# Patient Record
Sex: FEMALE | Race: WHITE | NOT HISPANIC OR LATINO | Employment: UNEMPLOYED | URBAN - METROPOLITAN AREA
[De-identification: names, ages, dates, MRNs, and addresses within clinical notes are randomized per-mention and may not be internally consistent; named-entity substitution may affect disease eponyms.]

---

## 2017-01-16 ENCOUNTER — ALLSCRIPTS OFFICE VISIT (OUTPATIENT)
Dept: OTHER | Facility: OTHER | Age: 27
End: 2017-01-16

## 2017-04-26 ENCOUNTER — ALLSCRIPTS OFFICE VISIT (OUTPATIENT)
Dept: OTHER | Facility: OTHER | Age: 27
End: 2017-04-26

## 2017-05-19 ENCOUNTER — ALLSCRIPTS OFFICE VISIT (OUTPATIENT)
Dept: OTHER | Facility: OTHER | Age: 27
End: 2017-05-19

## 2017-05-21 LAB
HSV 1 BY MOLECULAR METHOD (HISTORICAL): DETECTED
HSV 2 BY MOLECULAR METHOD (HISTORICAL): NOT DETECTED

## 2017-07-13 ENCOUNTER — TRANSCRIBE ORDERS (OUTPATIENT)
Dept: ADMINISTRATIVE | Facility: HOSPITAL | Age: 27
End: 2017-07-13

## 2017-07-13 ENCOUNTER — APPOINTMENT (OUTPATIENT)
Dept: RADIOLOGY | Facility: MEDICAL CENTER | Age: 27
End: 2017-07-13
Payer: COMMERCIAL

## 2017-07-13 ENCOUNTER — ALLSCRIPTS OFFICE VISIT (OUTPATIENT)
Dept: OTHER | Facility: OTHER | Age: 27
End: 2017-07-13

## 2017-07-13 DIAGNOSIS — R07.89 OTHER CHEST PAIN: ICD-10-CM

## 2017-07-13 DIAGNOSIS — R05.9 COUGH: ICD-10-CM

## 2017-07-13 PROCEDURE — 71020 HB CHEST X-RAY 2VW FRONTAL&LATL: CPT

## 2017-07-17 ENCOUNTER — ALLSCRIPTS OFFICE VISIT (OUTPATIENT)
Dept: OTHER | Facility: OTHER | Age: 27
End: 2017-07-17

## 2017-09-28 ENCOUNTER — GENERIC CONVERSION - ENCOUNTER (OUTPATIENT)
Dept: OTHER | Facility: OTHER | Age: 27
End: 2017-09-28

## 2017-11-27 ENCOUNTER — GENERIC CONVERSION - ENCOUNTER (OUTPATIENT)
Dept: OTHER | Facility: OTHER | Age: 27
End: 2017-11-27

## 2017-11-27 ENCOUNTER — TRANSCRIBE ORDERS (OUTPATIENT)
Dept: ADMINISTRATIVE | Facility: HOSPITAL | Age: 27
End: 2017-11-27

## 2017-11-27 ENCOUNTER — ALLSCRIPTS OFFICE VISIT (OUTPATIENT)
Dept: OTHER | Facility: OTHER | Age: 27
End: 2017-11-27

## 2017-11-27 ENCOUNTER — APPOINTMENT (OUTPATIENT)
Dept: RADIOLOGY | Facility: MEDICAL CENTER | Age: 27
End: 2017-11-27
Payer: COMMERCIAL

## 2017-11-27 DIAGNOSIS — M25.562 PAIN IN LEFT KNEE: ICD-10-CM
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
## 2017-11-27 DIAGNOSIS — M79.605 PAIN OF LEFT LEG: ICD-10-CM

## 2017-11-27 PROCEDURE — 73564 X-RAY EXAM KNEE 4 OR MORE: CPT

## 2017-11-27 PROCEDURE — 73590 X-RAY EXAM OF LOWER LEG: CPT

## 2017-11-28 NOTE — PROGRESS NOTES
Assessment    1  Acute pain of left knee (719 46) (M25 562)   2  Acute pain of left lower extremity (729 5) (M79 605)   3  Fall (on) (from) other stairs and steps, initial encounter (E880 9) (W10 8XXA)   4  Influenza vaccine administered (V04 81) (Z23)    Plan   Acute pain of left knee, Acute pain of left lower extremity, Fall (on) (from) other stairs andsteps, initial encounter    · TraMADol HCl - 50 MG Oral Tablet; take 1-2 tabs PO BID prn severe left leg/kneepain   · Meloxicam 15 MG Oral Tablet; TAKE 1 TABLET DAILY WITH FOOD  Influenza vaccine administered    · Fluzone Quadrivalent Intramuscular Suspension  * XR TIBIA FIBULA 2 VIEW LEFT; Status:Resulted - Requires Verification;   Done: 20HTW5662 12:00AM Due:27Nov2018; Ordered; For:Acute pain of left knee, Acute pain of left lower extremity, Fall (on) (from) other stairs and steps, initial encounter; Ordered By:Felicity Rosado;  * XR KNEE 4+ VW LEFT INJURY; Status:Resulted - Requires Verification;   Done: 21TZU0840 12:00AM Due:27Nov2018; Ordered; For:Acute pain of left knee, Acute pain of left lower extremity, Fall (on) (from) other stairs and steps, initial encounter; Ordered By:Zeny Rosado; Discussion/Summary    26-year-old female here today for concern of injury to left lower leg and left knee after falling down half a flight of steps about 4-5 days ago  She notes pain started a few hours after the fall in her left knee and has progressed down into her left lateral calf  With injury I will have her obtain a left knee and left tibia/fibula x-ray to rule out acute bony trauma  We will call her with results when they become available  I will have her discontinue anti-inflammatories over-the-counter and start meloxicam once a day with food in addition to tramadol p r n  severe, uncontrolled pain with side effects discussed   She was advised to consider wearing Ace wrap for compression around her knee instability as well as start icing her knee and heat to her left calf  She is to monitor her symptoms and call sooner with any concerns  Otherwise we will call her with results of the x-rays when they become available  If she is still in excruciating pain and x-rays do not reveal any acute bony or joint abnormalities I may consider MRI  Patient received flu vaccine today  Possible side effects of new medications were reviewed with the patient/guardian today  The treatment plan was reviewed with the patient/guardian  The patient/guardian understands and agrees with the treatment plan      Chief Complaint  Pt c/o L leg pain from her knee down x 5 days  Pt states she fell down half a flight of stairs  Pt states she feels pain when weight baring and when bending  History of Present Illness  HPI: 26y/o female here today for leg pain after falling down steps half flight of steps on thanksgiving about 5 days ago  she has been taking tylenol/ibuprofen, resting it and heat  states pain started in left knee, and since pain at top of knee and all down into her lower leg and left ankle  front of left knee and lateral lower leg  pain constant, worse to weight bear, bending knee going up steps  she doesnât know exactly how injury happened to knee/leg  Review of Systems   Constitutional: No fever, no chills, feels well, no tiredness, no recent weight gain or loss  Musculoskeletal: as noted in HPI  Integumentary: no complaints of skin rash or lesion, no itching or dry skin, no skin wounds  Neurological: no complaints of headache, no confusion, no numbness or tingling, no dizziness or fainting  Active Problems  1  Abnormal menses (626 9) (N92 6)   2  Breast tenderness in female (611 71) (N64 4)   3  Decreased hearing (389 9) (H91 90)   4  Dysfunction of left eustachian tube (381 81) (H69 82)   5  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) (A60 04)   6  Left-sided tinnitus (388 30) (H93 12)   7  Tinnitus of left ear (388 30) (H93 12)   8   Vertigo (780 4) (R42)    Past Medical History  1  History of Acute left lower quadrant pain (789 04,338 19) (R10 32)   2  History of Chest discomfort (786 59) (R07 89)   3  History of Healthy female   4  Denied: History of abnormal cervical Pap smear   5  History of acute bacterial sinusitis (V12 69) (Z87 09)   6  History of acute bronchitis (V12 69) (Z87 09)   7  History of cough   8  Denied: History of herpes simplex infection   9  History of nausea (V12 79) (Z87 898)   10  History of pregnancy (V13 29)   11  History of Pilonidal cyst with abscess (685 0) (L05 01)   12  History of Varicella without complication (361 7) (R56 4)   13  History of Vulvar lesion (624 8) (N90 89)   14  History of Vulvovaginitis (616 10) (N76 0)    Family History  Mother    1  Family history of epilepsy (V17 2) (Z82 0)   2  Family history of seizure disorder (V17 2) (Z82 0)  Father    3  Family history of cardiac disorder (V17 49) (Z82 49)   4  Family history of cardiac pacemaker (V17 49) (Z84 89)   5  Family history of hypertension (V17 49) (Z82 49)   6  Family history of stroke (V17 1) (Z82 3)  Paternal Grandfather    7  Family history of ischemic heart disease (V17 3) (Z82 49)  Paternal Relatives    8  Family history of cancer (V16 9) (Z80 9)    Social History   ·    · Never a smoker   · No alcohol use   · No drug use   · Occupation   · homemaker   in the past   · Worship Affiliation   · [facility]  The social history was reviewed and updated today  Surgical History    1  History of Cholecystectomy   2  History of Oral Surgery Tooth Extraction   3  History of Pilonidal Cyst Resection   4  History of Salpingectomy    Current Meds   1  Multi Vitamin Daily Oral Tablet; Therapy: (Recorded:14Ias8401) to Recorded   2  ZyrTEC Allergy 10 MG Oral Tablet; Take 1 tablet daily; Therapy: 26MAG9507 to Recorded    The medication list was reviewed and updated today  Allergies  1   No Known Drug Allergies    Vitals   Recorded: 93RGE3685 12: 26PM   Temperature 99 5 F, Tympanic   Heart Rate 120   Respiration Quality Normal   Respiration 16   Systolic 768, LUE, Sitting   Diastolic 72, LUE, Sitting   Weight 137 lb 9 oz   BMI Calculated 22 89   BSA Calculated 1 69   O2 Saturation 98, RA   Pain Scale 5       Physical Exam   Constitutional  General appearance: No acute distress, well appearing and well nourished  appears healthy,-- within normal limits of ideal weight-- and-- appearance reflects stated age  Musculoskeletal  Gait and station: Abnormal  -- antalgic gait  Inspection/palpation of joints, bones, and muscles: Abnormal  -- Left knee/leg appearing mildly swollen along the lateral compartment of the left knee without any significant obvious trauma or bony abnormality otherwise  There is no bruising or open wounds  There is exquisite tenderness to palpation along the medial and lateral borders as well as over the patella and inferior border to palpation as well as exquisite pain to light palpation along the lateral calf without any warmth, redness or swelling of that muscle  She has relatively full range of motion of her left actively with pain upon extreme flexion and extension  There is crepitus but no significant instability noted  Neck, spine and hips grossly normal on exam without any obvious instability or pain or limitation  Skin  Skin and subcutaneous tissue: Normal without rashes or lesions  Psychiatric  Orientation to person, place, and time: Normal    Mood and affect: Normal    Additional Exam:  Vitals reviewed  Signatures   Electronically signed by : Pily Henry, [facility]; Nov 27 2017 12:56PM EST                       (Author)    Electronically signed by :  Lauren Sandhoff, DO; Nov 27 2017 10:12PM EST                       (Author)

## 2018-01-09 NOTE — RESULT NOTES
Verified Results  (Q) CULTURE, URINE, SPECIAL 95BXC9036 12:00AM Terra Glass     Test Name Result Flag Reference   CULTURE, URINE, SPECIAL      CULTURE, URINE, SPECIAL         MICRO NUMBER:      [id number]    TEST STATUS:       FINAL    SPECIMEN SOURCE:   URINE    SPECIMEN QUALITY:  ADEQUATE    RESULT:            No Growth

## 2018-01-13 VITALS
OXYGEN SATURATION: 97 % | HEIGHT: 65 IN | HEART RATE: 115 BPM | TEMPERATURE: 99.2 F | DIASTOLIC BLOOD PRESSURE: 70 MMHG | SYSTOLIC BLOOD PRESSURE: 100 MMHG | WEIGHT: 132 LBS | BODY MASS INDEX: 21.99 KG/M2

## 2018-01-13 VITALS
RESPIRATION RATE: 16 BRPM | SYSTOLIC BLOOD PRESSURE: 112 MMHG | DIASTOLIC BLOOD PRESSURE: 76 MMHG | TEMPERATURE: 99.5 F | OXYGEN SATURATION: 98 % | HEIGHT: 65 IN | BODY MASS INDEX: 21.89 KG/M2 | WEIGHT: 131.38 LBS | HEART RATE: 110 BPM

## 2018-01-14 VITALS
HEART RATE: 87 BPM | RESPIRATION RATE: 16 BRPM | DIASTOLIC BLOOD PRESSURE: 70 MMHG | TEMPERATURE: 97.5 F | WEIGHT: 128.5 LBS | HEIGHT: 66 IN | SYSTOLIC BLOOD PRESSURE: 112 MMHG | OXYGEN SATURATION: 98 % | BODY MASS INDEX: 20.65 KG/M2

## 2018-01-14 VITALS
TEMPERATURE: 99.5 F | SYSTOLIC BLOOD PRESSURE: 116 MMHG | OXYGEN SATURATION: 98 % | HEART RATE: 120 BPM | BODY MASS INDEX: 22.89 KG/M2 | DIASTOLIC BLOOD PRESSURE: 72 MMHG | RESPIRATION RATE: 16 BRPM | WEIGHT: 137.56 LBS

## 2018-01-14 VITALS
SYSTOLIC BLOOD PRESSURE: 106 MMHG | DIASTOLIC BLOOD PRESSURE: 68 MMHG | WEIGHT: 133 LBS | HEIGHT: 65 IN | BODY MASS INDEX: 22.16 KG/M2

## 2018-01-14 VITALS
DIASTOLIC BLOOD PRESSURE: 76 MMHG | HEART RATE: 96 BPM | SYSTOLIC BLOOD PRESSURE: 112 MMHG | OXYGEN SATURATION: 98 % | BODY MASS INDEX: 21.84 KG/M2 | TEMPERATURE: 99.6 F | HEIGHT: 65 IN | WEIGHT: 131.06 LBS

## 2018-01-15 NOTE — RESULT NOTES
Verified Results  * US PELVIS COMPLETE (TRANSABDOMINAL AND TRANSVAGINAL) 11Vrl1241 10:03AM Nikole Bowden Order Number: [id number]   Performing Comments: may use intravaginal approach as necessary   - Patient Instructions: To schedule this appointment, please contact Central Scheduling at [phone]   Order Number: [id number]   Performing Comments: may use intravaginal approach as necessary   - Patient Instructions: To schedule this appointment, please contact Central Scheduling at [phone]  Test Name Result Flag Reference   US PELVIS COMPLETE (TRANSABDOMINAL AND TRANSVAGINAL) (Report)     PELVIC ULTRASOUND, COMPLETE     INDICATION: Irregular menstruation  Unsure of LMP  Levell Guise discharge August 12-August 19  Negative pregnancy test  Nausea        COMPARISON: None  TECHNIQUE:  Transabdominal pelvic ultrasound was performed in sagittal and transverse planes with a curvilinear transducer  Additional transvaginal imaging was performed to better evaluate the endometrium and ovaries  Imaging included volumetric    sweeps as well as traditional still imaging technique  FINDINGS: Transabdominal exam limited by empty urinary bladder  UTERUS:   The uterus is anteverted in position, measuring 8 5 x 4 4 x 4 3 cm  Volume = 85 mL  Contour and echotexture appear normal  Nabothian cysts lower uterine segment  The cervix shows no suspicious abnormality  ENDOMETRIUM:    Normal caliber of 8 mm  Homogenous and normal in appearance  OVARIES/ADNEXA:   Right ovary: 3 8 x 2 4 x 2 7 cm  Dominant follicle measuring 1 9 cm  No suspicious right ovarian abnormality  Doppler flow within normal limits  Left ovary: 2 8 x 1 9 x 1 4 cm  No suspicious left ovarian abnormality  Doppler flow within normal limits  No suspicious adnexal mass or loculated collections  Trace physiologic pelvic fluid  IMPRESSION:   Dominant follicle right ovary     Nabothian cysts lower uterine segment  Normal thickness endometrium  Workstation performed: [id number]     Signed by:    Yajaira Gonzalez DO   8/22/16

## 2018-01-15 NOTE — MISCELLANEOUS
Message   Recorded as Task   Date: 04/29/2016 09:07 AM, Created By: Srikanth Washington   Task Name: Call Back   Assigned To: [facility],Team   Regarding Patient: William Clayton, Status: Active   Comment:    Srikanth Washington - 29 Apr 2016 9:07 AM     TASK CREATED  Caller: Pilar Piedra  Routine post op call placed to patient  She had an exicision of pilonidal cyst on 4/28/16  No answer at [phone]  Message left for patient to call the office with a condition update  Her post op appointment is scheduled on 5/10/16 @ 1045 with CAMERON Lott Kathryn - 29 Apr 2016 12:07 PM     TASK EDITED  Patient called stating that she is doing well  She had no problems to report  She does ask for clarification on her lifting restrictions  Discussed with Dr Fiordaliza Kenyon  Her limitations are to be:  1  No lifting from a squatting position  2   No lifting more tht 15 - 20 pounds  3   She may lift 15 - 20 pounds from a chair but not from the floor  I called Eulalio Johns with the insturctions and she verbalized understanding  Denae Haley - 03 May 2016 9:50 AM     TASK EDITED  Message left for patient to call the office for pathology results  Denae Haley - 03 May 2016 2:31 PM     TASK EDITED  Called patient and informed her that the pathology results were negative for malignancy  She requested that her post op appointment be rescheduled from 5/10/16 to 5/12/16 @ [phone]  Active Problems    1  Pilonidal cyst (685 1) (L05 91)    Current Meds   1  Multi Vitamin Daily Oral Tablet; Therapy: (Recorded:70Qkv1096) to Recorded   2  Multivitamin Adult Oral Tablet; TAKE 1 TABLET DAILY; Therapy: 01Idi0331 to Recorded   3  Oxycodone-Acetaminophen 5-325 MG Oral Tablet; Therapy: 70Wpo1378 to Recorded    Allergies    1   No Known Drug Allergies    Signatures   Electronically signed by : Pilar Piedra, ; May  3 2016  2:31PM EST                       (Author)

## 2018-01-16 NOTE — PROGRESS NOTES
Active Problems    1  Abnormal menses (626 9) (N92 6)   2  Breast tenderness in female (611 71) (N64 4)   3  Contraceptive education (V25 09) (Z30 09)   4  Decreased hearing (389 9) (H91 90)   5  Dysfunction of left eustachian tube (381 81) (H69 82)   6  Left-sided tinnitus (388 30) (H93 12)   7  Multiparity (V61 5) (Z64 1)   8  Nausea (787 02) (R11 0)   9  Vulvar lesion (624 8) (N90 89)   10  Vulvovaginitis (616 10) (N76 0)    Current Meds   1  Monistat 7 Complete Therapy 100-2 MG-% Vaginal Kit; 1 applicatorful per vagina nightly   for 7 nights; Therapy: 66Pvc8495 to (Last Rx:70Val3096)  Requested for: 33Rus1077; Status:   ACTIVE - Transmit to [facility] Verification Ordered   2  Multi Vitamin Daily Oral Tablet; Therapy: (Recorded:34Mce9165) to Recorded    Allergies    1  No Known Drug Allergies    Future Appointments    Date/Time Provider Specialty Site   10/04/2016 11:00 AM CHAIM Redman  Obstetrics/Gynecology ST [address] [place] [address] OB/GYN     Message     Date: 19 Sep 2016 9:40 AM EST, Recorded By: Jose Luis Horne For: Clementine Sepulveda   Caller: Jacobo Kelly   Phone: [phone] (Home)   Reason: General Medical Question   pt  called the office stating that she was seen last Monday and was given a script for spots and itchiness  Itching has gone away but there are some spots left  Asked about having a biopsy  As per BEO okay to wait until Pt  gets back in town to reevaluate  Pt aware       Signatures   Electronically signed by : CHAIM Ortiz ; Sep 19 2016  1:36PM EST                       (Author)

## 2018-01-17 NOTE — RESULT NOTES
Verified Results  (1) TISSUE EXAM 27Oct2016 05:45PM Andry Rosen     Test Name Result Flag Reference   LAB AP CASE REPORT (Report)     Surgical Pathology Report             Case: [id number]                   Authorizing Provider: Teofilo Steve MD    Collected:      10/27/2016 1745        Ordering Location:   [facility]    Received:      10/28/2016 [address] Operating Room                            Pathologist:      Magnolia Alcantara MD                             Specimen:  Fallopian Tubes, Bilateral, Bilateral tubes   LAB AP FINAL DIAGNOSIS      A , B  Fallopian tubes, bilateral:    - Complete cross sections identified  - Benign paratubal cysts  Electronically signed by Magnolia Alcantara MD on 10/31/2016 at 9:17 AM   LAB AP NOTE      Interpretation performed at [facility], [address] [place] [address]   [id number]   LAB AP SURGICAL ADDITIONAL INFORMATION (Report)     These tests were developed and their performance characteristics   determined by [facility] or [facility]  They may not be cleared or approved by the [place]  Food and   Drug Administration  The FDA has determined that such clearance or   approval is not necessary  These tests are used for clinical purposes  They should not be regarded as investigational or for research  This   laboratory has been approved by [facility], designated as a high-complexity   laboratory and is qualified to perform these tests  LAB AP GROSS DESCRIPTION (Report)     A  The specimen is received in formalin, labeled with the patient's name   and hospital number, and is designated bilateral tubes   The specimen   consists of 2 soft and rubbery purple tan fallopian tubes  One fallopian   tube with fimbria measures 10 8 cm in length by 0 5 cm in average diameter   which exhibits a paratubular cyst measuring up to 0 6 cm in greatest   dimension   The other fallopian tube with fimbria measures 11 7 cm in   length and from 0 3 up to 0 7 cm in diameter which also exhibits a   paratubular cyst measuring up to 0 5 cm in greatest dimension  Representative sections are submitted as follows:  1: One fallopian tube including paratubular cyst   2: Other fallopian tube including paratubular cyst     Note: The estimated total formalin fixation time based upon information   provided by the submitting clinician and the standard processing schedule   is 26 75 hours      Avinash Duran

## 2018-01-20 ENCOUNTER — ALLSCRIPTS OFFICE VISIT (OUTPATIENT)
Dept: OTHER | Facility: OTHER | Age: 28
End: 2018-01-20

## 2018-01-20 DIAGNOSIS — R10.11 RIGHT UPPER QUADRANT PAIN: ICD-10-CM

## 2018-01-20 DIAGNOSIS — R11.0 NAUSEA: ICD-10-CM

## 2018-01-21 NOTE — PROGRESS NOTES
Assessment   1  Intermittent right upper quadrant abdominal pain (789 01) (R10 11)   2  Nausea (787 02) (R11 0)    Plan   Fatigue, unspecified type, Intermittent right upper quadrant abdominal pain, Nausea    · Routine Venipuncture - POC; Status:Complete;   Done: 02INL3142 12:00PM   · (1) CELIAC DISEASE AB PROFILE; Status: In Progress - Specimen/Data Collected;      Done: 59AIC2700   · (1) LIPASE; Status: In Progress - Specimen/Data Collected;   Done: 10WZT3541   · (Q) CBC (INCLUDES DIFF/PLT) (REFL); Status:Active; Requested TIH:97SLA3254;    · (Q) COMPREHENSIVE METABOLIC PNL W/ADJUSTED CALCIUM; Status:Active; Requested DSA:25QHH8428; Intermittent right upper quadrant abdominal pain, Nausea    · Omeprazole 20 MG Oral Capsule Delayed Release; TAKE 1 CAPSULE Daily   · Ondansetron 4 MG Oral Tablet Disintegrating; Dissolve one tablet in mouth three    times daily as needed   · * US ABDOMEN COMPLETE; Status:Hold For - Scheduling; Requested ZVK:79XXW7858; Discussion/Summary      Discussed symptoms with pt in detail today  The underlying etiology of her condition is unclear  I will check BW today including CBC, CMP, lipase, celiac panel and call with results once obtained  She is S/P cholecystectomy and I do not suspect appendicitis  I rec  hydration, rest, avoiding fatty greasy foods and sticking to low carb diet  I will prescribe her Zofran for nausea and Omeprazole for the epigastric pain and to cut down on acid production  I also gave her Rx for abdominal US and will call with results once obtained  If her symptoms dramatically worsen, she should go to the ER  Possible side effects of new medications were reviewed with the patient/guardian today  The treatment plan was reviewed with the patient/guardian  The patient/guardian understands and agrees with the treatment plan      Chief Complaint   patient here c/o nausea, lost of appetite and pain in her R/rib when eating   problem when going to the bathroom History of Present Illness   HPI: Pt  presents with a few day history of intermittent RUQ pain after eating and wraps around to the flank occ  She has decreased appetite  She reports nausea after eating along with belching but no vomiting  She reports her bowel movements are slightly more frequent and a little harder for her to pass  She is staying hydrated  Denies radiating pain to right shoulder  Denies fever, chills  She reports any solid food other than crackers makes the pain worse  She has changed her diet to low carb and is eating more eggs and vegetables and cutting out greasy foods  She no longer has a gallbladder or her fallopian tubes  She denies any heartburn  Denies any  symptoms  Denies hematuria, melena, hematochezia  She has been taking Tums and IBU  Review of Systems        Constitutional: No fever, no chills, feels well, no tiredness, no recent weight gain or loss  Cardiovascular: no complaints of slow or fast heart rate, no chest pain, no palpitations, no leg claudication or lower extremity edema  Respiratory: no complaints of shortness of breath, no wheezing, no dyspnea on exertion, no orthopnea or PND  Gastrointestinal: as noted in HPI  Genitourinary: no complaints of dysuria, no incontinence, no pelvic pain, no dysmenorrhea, no vaginal discharge or abnormal vaginal bleeding  Active Problems   1  Abnormal menses (626 9) (N92 6)   2  Acute pain of left knee (719 46) (M25 562)   3  Acute pain of left lower extremity (729 5) (M79 605)   4  Breast tenderness in female (611 71) (N64 4)   5  Decreased hearing (389 9) (H91 90)   6  Dysfunction of left eustachian tube (381 81) (H69 82)   7  Fall (on) (from) other stairs and steps, initial encounter (E880 9) (W10 8XXA)   8  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) (A60 04)   9  Influenza vaccine administered (V04 81) (Z23)   10  Left-sided tinnitus (388 30) (H93 12)   11  Tinnitus of left ear (388 30) (H93 12)   12  Vertigo (780 4) (R42)    Past Medical History   1  History of Acute left lower quadrant pain (789 04,338 19) (R10 32)   2  History of Chest discomfort (786 59) (R07 89)   3  History of Healthy female   4  Denied: History of abnormal cervical Pap smear   5  History of acute bacterial sinusitis (V12 69) (Z87 09)   6  History of acute bronchitis (V12 69) (Z87 09)   7  History of cough   8  Denied: History of herpes simplex infection   9  History of nausea (V12 79) (Z87 898)   10  History of pregnancy (V13 29)   11  History of Pilonidal cyst with abscess (685 0) (L05 01)   12  History of Varicella without complication (926 4) (P26 3)   13  History of Vulvar lesion (624 8) (N90 89)   14  History of Vulvovaginitis (616 10) (N76 0)    Family History   Mother    1  Family history of epilepsy (V17 2) (Z82 0)   2  Family history of seizure disorder (V17 2) (Z82 0)  Father    3  Family history of cardiac disorder (V17 49) (Z82 49)   4  Family history of cardiac pacemaker (V17 49) (Z84 89)   5  Family history of hypertension (V17 49) (Z82 49)   6  Family history of stroke (V17 1) (Z82 3)  Paternal Grandfather    7  Family history of ischemic heart disease (V17 3) (Z82 49)  Paternal Relatives    8  Family history of cancer (V16 9) (Z80 9)    Social History    ·    · Never a smoker   · No alcohol use   · No drug use   · Occupation   · homemaker   in the past   · [employer]iliation   · [address] [place] [address]  The social history was reviewed and is unchanged  Surgical History   1  History of Cholecystectomy   2  History of Oral Surgery Tooth Extraction   3  History of Pilonidal Cyst Resection   4  History of Salpingectomy    Current Meds    1  Multi Vitamin Daily Oral Tablet; Therapy: (Recorded:79Vpb1165) to Recorded   2  ZyrTEC Allergy 10 MG Oral Tablet; Take 1 tablet daily; Therapy: 95RTQ3488 to Recorded     The medication list was reviewed and updated today  Allergies   1   No Known Drug Allergies    Vitals    Recorded: 30GVP8262 10:46AM   Temperature 99 F, Tympanic   Heart Rate 104   Pulse Quality Normal   Respiration Quality Normal   Respiration 18   Systolic 716, LUE, Sitting   Diastolic 68, LUE, Sitting   Height 5 ft 5 in   Weight 132 lb 5 oz   BMI Calculated 22 02   BSA Calculated 1 66   O2 Saturation 98   Pain Scale 0     Physical Exam        Constitutional      General appearance: No acute distress, well appearing and well nourished  Ears, Nose, Mouth, and Throat      Oropharynx: Normal with no erythema, edema, exudate or lesions  Pulmonary      Respiratory effort: No increased work of breathing or signs of respiratory distress  Auscultation of lungs: Clear to auscultation  Cardiovascular      Auscultation of heart: Normal rate and rhythm, normal S1 and S2, without murmurs  Abdomen      Abdomen: Abnormal  -- +BS, soft, ND; epigastric and RUQ tenderness to palpation and also slightly tender lower border of right posterior ribcage; no rebound, duarding, or rigidity  Liver and spleen: No hepatomegaly or splenomegaly  Lymphatic      Palpation of lymph nodes in neck: No lymphadenopathy  Psychiatric      Orientation to person, place, and time: Normal        Mood and affect: Normal        Additional Exam:  Vital signs were reviewed; neck supple  Signatures    Electronically signed by : Sherri He, [facility]; Jan 20 2018 11:45AM EST                       (Author)     Electronically signed by :  Beverley Terry DO; Jan 20 2018  1:59PM EST                       (Author)

## 2018-01-22 ENCOUNTER — LAB CONVERSION - ENCOUNTER (OUTPATIENT)
Dept: OTHER | Facility: OTHER | Age: 28
End: 2018-01-22

## 2018-01-22 LAB
A/G RATIO (HISTORICAL): 1.7 (CALC) (ref 1–2.5)
ALBUMIN SERPL BCP-MCNC: 4.7 G/DL (ref 3.6–5.1)
ALP SERPL-CCNC: 55 U/L (ref 33–115)
ALT SERPL W P-5'-P-CCNC: 12 U/L (ref 6–29)
AST SERPL W P-5'-P-CCNC: 16 U/L (ref 10–30)
BASOPHILS # BLD AUTO: 1 %
BASOPHILS # BLD AUTO: 49 CELLS/UL (ref 0–200)
BILIRUB SERPL-MCNC: 0.7 MG/DL (ref 0.2–1.2)
BUN SERPL-MCNC: 13 MG/DL (ref 7–25)
BUN/CREA RATIO (HISTORICAL): NORMAL (CALC) (ref 6–22)
CALCIUM (ADJUSTED FOR ALBUMIN) (HISTORICAL): 9.5 MG/DL (CALC) (ref 8.6–10.2)
CALCIUM SERPL-MCNC: 9.7 MG/DL (ref 8.6–10.2)
CHLORIDE SERPL-SCNC: 106 MMOL/L (ref 98–110)
CO2 SERPL-SCNC: 26 MMOL/L (ref 20–31)
CREAT SERPL-MCNC: 0.83 MG/DL (ref 0.5–1.1)
DEPRECATED RDW RBC AUTO: 12.5 % (ref 11–15)
EGFR AFRICAN AMERICAN (HISTORICAL): 112 ML/MIN/1.73M2
EGFR-AMERICAN CALC (HISTORICAL): 97 ML/MIN/1.73M2
EOSINOPHIL # BLD AUTO: 372 CELLS/UL (ref 15–500)
EOSINOPHIL # BLD AUTO: 7.6 %
GAMMA GLOBULIN (HISTORICAL): 2.7 G/DL (CALC) (ref 1.9–3.7)
GLUCOSE (HISTORICAL): 97 MG/DL (ref 65–99)
HCT VFR BLD AUTO: 45.2 % (ref 35–45)
HGB BLD-MCNC: 15.6 G/DL (ref 11.7–15.5)
IGA (HISTORICAL): 98 MG/DL (ref 81–463)
INTERPRETATION (HISTORICAL): NORMAL
LIPASE SERPL-CCNC: 21 U/L (ref 7–60)
LYMPHOCYTES # BLD AUTO: 1588 CELLS/UL (ref 850–3900)
LYMPHOCYTES # BLD AUTO: 32.4 %
MCH RBC QN AUTO: 31.1 PG (ref 27–33)
MCHC RBC AUTO-ENTMCNC: 34.5 G/DL (ref 32–36)
MCV RBC AUTO: 90 FL (ref 80–100)
MONOCYTES # BLD AUTO: 421 CELLS/UL (ref 200–950)
MONOCYTES (HISTORICAL): 8.6 %
NEUTROPHILS # BLD AUTO: 2470 CELLS/UL (ref 1500–7800)
NEUTROPHILS # BLD AUTO: 50.4 %
PLATELET # BLD AUTO: 229 THOUSAND/UL (ref 140–400)
PMV BLD AUTO: 9.4 FL (ref 7.5–12.5)
POTASSIUM SERPL-SCNC: 4.6 MMOL/L (ref 3.5–5.3)
RBC # BLD AUTO: 5.02 MILLION/UL (ref 3.8–5.1)
SODIUM SERPL-SCNC: 140 MMOL/L (ref 135–146)
TOTAL PROTEIN (HISTORICAL): 7.4 G/DL (ref 6.1–8.1)
TTG IGA (HISTORICAL): 1 U/ML
WBC # BLD AUTO: 4.9 THOUSAND/UL (ref 3.8–10.8)

## 2018-01-24 ENCOUNTER — TELEPHONE (OUTPATIENT)
Dept: FAMILY MEDICINE CLINIC | Facility: CLINIC | Age: 28
End: 2018-01-24

## 2018-01-24 NOTE — TELEPHONE ENCOUNTER
Call pt, let her know that her CMP and celiac panel were WNL  I do not see results of CBC and lipase in the system  Can you obtain rest of results so we can give to pt?

## 2018-01-25 ENCOUNTER — HOSPITAL ENCOUNTER (OUTPATIENT)
Dept: ULTRASOUND IMAGING | Facility: HOSPITAL | Age: 28
Discharge: HOME | End: 2018-01-25
Payer: COMMERCIAL

## 2018-01-25 DIAGNOSIS — R10.11 RIGHT UPPER QUADRANT PAIN: ICD-10-CM

## 2018-01-25 DIAGNOSIS — R11.0 NAUSEA: ICD-10-CM

## 2018-01-25 PROCEDURE — 76700 US EXAM ABDOM COMPLETE: CPT

## 2018-02-06 ENCOUNTER — TELEPHONE (OUTPATIENT)
Dept: FAMILY MEDICINE CLINIC | Facility: CLINIC | Age: 28
End: 2018-02-06

## 2018-02-08 NOTE — TELEPHONE ENCOUNTER
Call pt, her abdominal ultrasound was negative  Consider referral to gastroenterologist if symptoms persist   Consider going to emergency room if symptoms worsen  If she would like to be referred to GI, let me know and I can place a referral order

## 2018-02-08 NOTE — TELEPHONE ENCOUNTER
Pt had an ultrasound on 1/20 and still has low grade temp,pain and bloating after eating  She would like results

## 2018-02-12 DIAGNOSIS — R10.11 RIGHT UPPER QUADRANT PAIN: Primary | ICD-10-CM

## 2018-02-13 NOTE — RESULT NOTES
Verified Results  * XR TIBIA FIBULA 2 VIEW LEFT 27Nov2017 01:37PM Gwenette Done Order Number: [id number]     Test Name Result Flag Reference   XR TIBIA FIBULA 2 VW LEFT (Report)     LEFT TIBIA AND FIBULA     INDICATION: Patient fell down the steps, left knee pain     COMPARISON: None     VIEWS: AP and lateral     IMAGES: 4     FINDINGS:     There is no acute fracture or dislocation  No degenerative changes  No lytic or blastic lesions are seen  Soft tissues are unremarkable  IMPRESSION:     No acute osseous abnormality  Workstation performed: [id number]     Signed by:   Laurence Eaton MD   11/27/17     * XR KNEE 4+ VW LEFT INJURY [id number] 01:37PM Gwenette Done Order Number: [id number]     Test Name Result Flag Reference   XR KNEE 4+ VW LEFT (Report)     LEFT KNEE     INDICATION: Patient fell down the steps, left knee pain     COMPARISON: None  VIEWS: 4     IMAGES: 4     FINDINGS:     There is no acute fracture or dislocation  There is no joint effusion  No degenerative changes  No lytic or blastic lesions are seen  Soft tissues are unremarkable  IMPRESSION:     No acute osseous abnormality         Workstation performed: [id number]     Signed by:   Laurence Eaton MD   11/27/17

## 2018-02-23 DIAGNOSIS — R10.11 INTERMITTENT RIGHT UPPER QUADRANT ABDOMINAL PAIN: Primary | ICD-10-CM

## 2018-02-23 DIAGNOSIS — R11.0 NAUSEA: ICD-10-CM

## 2018-02-23 NOTE — TELEPHONE ENCOUNTER
Call pt back, if she is still taking the Omeprazole, she should increase to 40 mg daily  I can also prescribe her Carafate if she'd like which can help to coat her stomach  If she agrees to this, then I will send Rx into her pharmacy

## 2018-02-23 NOTE — TELEPHONE ENCOUNTER
Pt called does not have appt for GI until March 6 but she can no longer eat any food without pain  She has a lot of pain and nausea she has been drinking insure for the last 4 or 5 days but is having bad nausea with it to  She would like you to call her and let her know if you suggest anything else

## 2018-02-27 RX ORDER — OMEPRAZOLE 20 MG/1
1 CAPSULE, DELAYED RELEASE ORAL DAILY
COMMUNITY
Start: 2018-01-20 | End: 2018-02-27 | Stop reason: SDUPTHER

## 2018-02-27 NOTE — TELEPHONE ENCOUNTER
Pt is completely out of Omeprazole, I teed medication for you and adjusted dosage to 1 tablet 2 times daily   Pt would also like Rx for Carafate to be sent to [facility] [place] in [place]

## 2018-02-28 DIAGNOSIS — R11.0 NAUSEA: ICD-10-CM

## 2018-02-28 DIAGNOSIS — R10.11 INTERMITTENT RIGHT UPPER QUADRANT ABDOMINAL PAIN: Primary | ICD-10-CM

## 2018-02-28 RX ORDER — OMEPRAZOLE 20 MG/1
20 CAPSULE, DELAYED RELEASE ORAL 2 TIMES DAILY
Qty: 30 CAPSULE | Refills: 1 | Status: SHIPPED | OUTPATIENT
Start: 2018-02-28 | End: 2019-01-16

## 2018-02-28 RX ORDER — SUCRALFATE ORAL 1 G/10ML
1 SUSPENSION ORAL
Qty: 420 ML | Refills: 1 | Status: SHIPPED | OUTPATIENT
Start: 2018-02-28 | End: 2018-04-27

## 2018-03-06 ENCOUNTER — APPOINTMENT (OUTPATIENT)
Dept: LAB | Facility: MEDICAL CENTER | Age: 28
End: 2018-03-06
Attending: INTERNAL MEDICINE
Payer: COMMERCIAL

## 2018-03-06 ENCOUNTER — OFFICE VISIT (OUTPATIENT)
Dept: GASTROENTEROLOGY | Facility: MEDICAL CENTER | Age: 28
End: 2018-03-06
Payer: COMMERCIAL

## 2018-03-06 ENCOUNTER — TELEPHONE (OUTPATIENT)
Dept: GASTROENTEROLOGY | Facility: MEDICAL CENTER | Age: 28
End: 2018-03-06

## 2018-03-06 VITALS
HEART RATE: 99 BPM | DIASTOLIC BLOOD PRESSURE: 72 MMHG | WEIGHT: 132 LBS | SYSTOLIC BLOOD PRESSURE: 112 MMHG | HEIGHT: 65 IN | BODY MASS INDEX: 21.99 KG/M2 | TEMPERATURE: 98.7 F

## 2018-03-06 DIAGNOSIS — R10.11 RIGHT UPPER QUADRANT PAIN: ICD-10-CM

## 2018-03-06 DIAGNOSIS — K59.00 CONSTIPATION, UNSPECIFIED CONSTIPATION TYPE: Primary | ICD-10-CM

## 2018-03-06 LAB
ANION GAP SERPL CALCULATED.3IONS-SCNC: 5 MMOL/L (ref 4–13)
BUN SERPL-MCNC: 20 MG/DL (ref 5–25)
CALCIUM SERPL-MCNC: 8.9 MG/DL (ref 8.3–10.1)
CHLORIDE SERPL-SCNC: 107 MMOL/L (ref 100–108)
CO2 SERPL-SCNC: 30 MMOL/L (ref 21–32)
CREAT SERPL-MCNC: 0.66 MG/DL (ref 0.6–1.3)
GFR SERPL CREATININE-BSD FRML MDRD: 121 ML/MIN/1.73SQ M
GLUCOSE SERPL-MCNC: 83 MG/DL (ref 65–140)
POTASSIUM SERPL-SCNC: 4.2 MMOL/L (ref 3.5–5.3)
SODIUM SERPL-SCNC: 142 MMOL/L (ref 136–145)

## 2018-03-06 PROCEDURE — 80048 BASIC METABOLIC PNL TOTAL CA: CPT

## 2018-03-06 PROCEDURE — 99243 OFF/OP CNSLTJ NEW/EST LOW 30: CPT | Performed by: INTERNAL MEDICINE

## 2018-03-06 PROCEDURE — 36415 COLL VENOUS BLD VENIPUNCTURE: CPT

## 2018-03-06 RX ORDER — POLYETHYLENE GLYCOL 3350 17 G/17G
17 POWDER, FOR SOLUTION ORAL DAILY
Qty: 500 G | Refills: 0 | Status: ON HOLD | OUTPATIENT
Start: 2018-03-06 | End: 2018-05-09 | Stop reason: ALTCHOICE

## 2018-03-06 NOTE — TELEPHONE ENCOUNTER
Dr Anders Dumas pt  Musasharif Marino Musasharif Marino [address] [facility] called stating they need to know the strength of Polyethylene Powder   Pharmacist can be reached at [phone]

## 2018-03-06 NOTE — LETTER
March 6, 2018     [facility]  [address] [facility]  [address]    Patient: Olamide Power   YOB: 1990   Date of Visit: 3/6/2018       Dear Dr Surya Buenrostro: Thank you for referring Olamide Power to me for evaluation  Below are my notes for this consultation  If you have questions, please do not hesitate to call me  I look forward to following your patient along with you           Sincerely,        Amish Figueredo MD        CC: No Recipients

## 2018-03-06 NOTE — TELEPHONE ENCOUNTER
I spoke to the pharmacist at [facility] and explained that Dr Brandon Patterson wanted MiraLAX, per her office note from today  The pharmacist made the appropriate changes

## 2018-03-06 NOTE — PROGRESS NOTES
[facility] - Outpatient Consultation  Stephanie Rankin 32 y o  female MRN: [id number]  Encounter: [id number]          ASSESSMENT AND PLAN:      1  Right upper quadrant pain  -patient has an abdominal ultrasound which was essentially negative  Etiology of right upper quadrant pain is not clear at this point  She has been having pain in the past 2 months with weight loss and change of diet  I would recommend to do a CT scan with IV contrast     2  Constipation, unspecified constipation type  -it could be possible patient has abdominal pain secondary to chronic constipation  She was counseled on importance of water and fiber intake  I recommend her using MiraLax once a day to relieve her symptoms  If her symptoms do not improve in the months, we will consider to do an upper EGD to rule out peptic ulcer disease  Also consider started PPI for dyspepsia after constipation has improved  ______________________________________________________________________    HPI:      63-year-old female referred by her primary care doctor for evaluation of right upper quadrant abdominal pain  Patient reported she has sudden onset of right upper quadrant pain since January  Pain the usually worsens after she ate solid food  She reported she could not tolerate any solid food and she has been on liquid diet was mostly scrambled eggs,  milk and water  She lost around 3 lb since January  Patient takes NSAIDs during her period  Once a month  Patient reported feeling severe constipation  She has bowel movement once every other day with sense of incomplete evacuation  She denies blood in stool  REVIEW OF SYSTEMS:    CONSTITUTIONAL: Denies any fever, chills, rigors, and weight loss  HEENT: No earache or tinnitus  Denies hearing loss or visual disturbances  CARDIOVASCULAR: No chest pain or palpitations     RESPIRATORY: Denies any cough, hemoptysis, shortness of breath or dyspnea on exertion  GASTROINTESTINAL: As noted in the History of Present Illness  GENITOURINARY: No problems with urination  Denies any hematuria or dysuria  NEUROLOGIC: No dizziness or vertigo, denies headaches  MUSCULOSKELETAL: Denies any muscle or joint pain  SKIN: Denies skin rashes or itching  ENDOCRINE: Denies excessive thirst  Denies intolerance to heat or cold  PSYCHOSOCIAL: Denies depression or anxiety  Denies any recent memory loss  Historical Information   Past Medical History:   Diagnosis Date    Anxiety     Depression     Pilonidal cyst with abscess     Seasonal allergies     Varicella      Past Surgical History:   Procedure Laterality Date    CHOLECYSTECTOMY  08/2010    Laparoscopic    OK LAP,RMV  ADNEXAL STRUCTURE Bilateral 10/27/2016    Procedure: SALPINGECTOMY;  Surgeon: Zev Pike MD;  [facility];  Service: Gynecology    OK BOLETUS NETWORK N/A 4/28/2016    Procedure: EXCISION PILONIDAL CYST;  Surgeon: Lowell Holden MD;  [facility];  Service: General    WISDOM TOOTH EXTRACTION  01/2016    right upper only     Social History   History   Alcohol Use No     History   Drug Use No     History   Smoking Status    Never Smoker   Smokeless Tobacco    Never Used     Family History   Problem Relation Age of Onset    Seizures Mother     Dysrhythmia Father     Hypertension Father     Stroke Father     Coronary artery disease Paternal Grandfather        Meds/Allergies       Current Outpatient Prescriptions:     cetirizine (ZyrTEC) 10 mg tablet    Multiple Vitamin (MULTIVITAMIN) tablet    omeprazole (PriLOSEC) 20 mg delayed release capsule    polyethylene glycol (GLYCOLAX) powder    sucralfate (CARAFATE) 1 g/10 mL suspension    No Known Allergies        Objective     Blood pressure 112/72, pulse 99, temperature 98 7 °F (37 1 °C), height 5' 5" (1 651 m), weight 59 9 kg (132 lb)          PHYSICAL EXAM:      General Appearance:   Alert, cooperative, no distress   HEENT:   Normocephalic, atraumatic, anicteric      Neck:  Supple, symmetrical, trachea midline   Lungs:   Clear to auscultation bilaterally; no rales, rhonchi or wheezing; respirations unlabored    Heart[de-identified]   Regular rate and rhythm; no murmur, rub, or gallop  Abdomen:   Soft, mild tenderness in the RUQ but not on the ribs, non-distended; normal bowel sounds; no masses, no organomegaly    Genitalia:   Deferred    Rectal:   Deferred    Extremities:  No cyanosis, clubbing or edema    Pulses:  2+ and symmetric    Skin:  No jaundice, rashes, or lesions    Lymph nodes:  No palpable cervical lymphadenopathy        Lab Results:   No visits with results within 1 Day(s) from this visit  Latest known visit with results is:   Lab Conversion - Encounter on 01/22/2018   Component Date Value    TTG IGA 01/20/2018 1     IGA 01/20/2018 98     INTERPRETATION 01/20/2018 No serological evidence of celiac disease  tTG IgA may normalize in individuals with celiac diseasewho maintain a gluten-free diet  Consider HLA DQ2 andDQ8 testing to rule out celiac disease  Celiac diseaseis extremely rare in the absence of DQ2 or DQ8   WBC 01/20/2018 4 9     RBC 01/20/2018 5 02     Hemoglobin 01/20/2018 15 6*    Hematocrit 01/20/2018 45 2*    MCV 01/20/2018 90 0     MCH 01/20/2018 31 1     MCHC 01/20/2018 34 5     Platelets 97/32/0276 229     RDW 01/20/2018 12 5     Neutrophils Absolute 01/20/2018 2470     Lymphocytes Absolute 01/20/2018 1588     Monocytes Absolute 01/20/2018 421     Eosinophils Absolute 01/20/2018 372     Basophils Absolute 01/20/2018 49     Neutrophils Absolute 01/20/2018 50 4     Lymphocytes Absolute 01/20/2018 32 4     MONOCYTES 01/20/2018 8 6     Eosinophils Absolute 01/20/2018 7 6     Basophils Relative 01/20/2018 1 0     MPV 01/20/2018 9 4     Lipase 01/20/2018 21          Radiology Results:   No results found

## 2018-03-15 ENCOUNTER — HOSPITAL ENCOUNTER (OUTPATIENT)
Dept: CT IMAGING | Facility: HOSPITAL | Age: 28
Discharge: HOME | End: 2018-03-15
Attending: INTERNAL MEDICINE
Payer: COMMERCIAL

## 2018-03-15 DIAGNOSIS — R10.11 RIGHT UPPER QUADRANT PAIN: ICD-10-CM

## 2018-03-15 PROCEDURE — 74177 CT ABD & PELVIS W/CONTRAST: CPT

## 2018-03-15 RX ADMIN — IOHEXOL 100 ML: 350 INJECTION, SOLUTION INTRAVENOUS at 17:04

## 2018-03-22 ENCOUNTER — TELEPHONE (OUTPATIENT)
Dept: GASTROENTEROLOGY | Facility: CLINIC | Age: 28
End: 2018-03-22

## 2018-03-22 ENCOUNTER — TELEPHONE (OUTPATIENT)
Dept: GASTROENTEROLOGY | Facility: MEDICAL CENTER | Age: 28
End: 2018-03-22

## 2018-03-22 NOTE — TELEPHONE ENCOUNTER
----- Message from Cassie Restrepo MD sent at 3/22/2018  2:43 PM EDT -----  CT was unremarkable  No etiology found  to explain her chronic abdominal pain

## 2018-03-27 ENCOUNTER — TELEPHONE (OUTPATIENT)
Dept: GASTROENTEROLOGY | Facility: MEDICAL CENTER | Age: 28
End: 2018-03-27

## 2018-03-27 NOTE — TELEPHONE ENCOUNTER
----- Message from Manuelito Salgado MD sent at 3/22/2018  2:43 PM EDT -----  CT was unremarkable  No etiology found  to explain her chronic abdominal pain

## 2018-04-27 ENCOUNTER — APPOINTMENT (OUTPATIENT)
Dept: LAB | Facility: MEDICAL CENTER | Age: 28
End: 2018-04-27
Attending: INTERNAL MEDICINE
Payer: COMMERCIAL

## 2018-04-27 ENCOUNTER — OFFICE VISIT (OUTPATIENT)
Dept: GASTROENTEROLOGY | Facility: MEDICAL CENTER | Age: 28
End: 2018-04-27
Payer: COMMERCIAL

## 2018-04-27 VITALS
TEMPERATURE: 98.2 F | WEIGHT: 135 LBS | SYSTOLIC BLOOD PRESSURE: 112 MMHG | BODY MASS INDEX: 22.49 KG/M2 | HEIGHT: 65 IN | HEART RATE: 78 BPM | DIASTOLIC BLOOD PRESSURE: 72 MMHG

## 2018-04-27 DIAGNOSIS — K59.04 CHRONIC IDIOPATHIC CONSTIPATION: ICD-10-CM

## 2018-04-27 DIAGNOSIS — K59.04 CHRONIC IDIOPATHIC CONSTIPATION: Primary | ICD-10-CM

## 2018-04-27 DIAGNOSIS — R10.11 RIGHT UPPER QUADRANT ABDOMINAL PAIN: ICD-10-CM

## 2018-04-27 LAB — TSH SERPL DL<=0.05 MIU/L-ACNC: 1.63 UIU/ML (ref 0.36–3.74)

## 2018-04-27 PROCEDURE — 36415 COLL VENOUS BLD VENIPUNCTURE: CPT

## 2018-04-27 PROCEDURE — 84443 ASSAY THYROID STIM HORMONE: CPT

## 2018-04-27 PROCEDURE — 99213 OFFICE O/P EST LOW 20 MIN: CPT | Performed by: INTERNAL MEDICINE

## 2018-04-27 NOTE — PROGRESS NOTES
[facility] - Outpatient Follow-up Note  Syed Lynn 32 y o  female MRN: [id number]  Encounter: [id number]          ASSESSMENT AND PLAN:      1  Chronic idiopathic constipation  - I was start patien on amitiza to  see whether it can improve her constipation symptoms  -schedule colonoscopy to rule out any obstructive lesions  Patient was counseled on the benefits and risks of endoscopic intervention including but not limited to bleeding, infection, bowel perforation  Patient also understands colonoscopy is not 100% sensitive to detect polyps  -TSH to rule out hypothyroidism     -lubiprostone (AMITIZA) 24 mcg capsule; Take 1 capsule (24 mcg total) by mouth 2 (two) times a day with meals  Dispense: 60 capsule; Refill: 2  - Na Sulfate-K Sulfate-Mg Sulf (SUPREP BOWEL PREP KIT) 17 5-3 13-1 6 GM/180ML SOLN; Take 1 Bottle by mouth once for 1 dose  Dispense: 1 Bottle; Refill: 0  - Case request operating room: COLONOSCOPY; Standing  - Case request operating room: COLONOSCOPY  - TSH, 3rd generation; Future    ______________________________________________________________________    SUBJECTIVE:      59-year-old female presented for follow-up on right upper quadrant the abdominal pain and constipation  Patient was started on MiraLax but her bowel habits still was not regulated  She reported small pellet stool alternating with diarrhea  Patient reported her right side abdominal pain has largely subsided  She underwent a CT scan with IV contrast with negative findings      REVIEW OF SYSTEMS IS OTHERWISE NEGATIVE        Historical Information   Past Medical History:   Diagnosis Date    Anxiety     Depression     Pilonidal cyst with abscess     Seasonal allergies     Varicella      Past Surgical History:   Procedure Laterality Date    CHOLECYSTECTOMY  08/2010    Laparoscopic    HI LAP,RMV  ADNEXAL STRUCTURE Bilateral 10/27/2016    Procedure: SALPINGECTOMY;  Surgeon: Grover Post MD; [facility];  Service: Gynecology    NJ REMV PILONIDAL LESION SIMPLE N/A 4/28/2016    Procedure: EXCISION PILONIDAL CYST;  Surgeon: Krysten Zapata MD;  [facility];  Service: General    WISDOM TOOTH EXTRACTION  01/2016    right upper only     Social History   History   Alcohol Use No     History   Drug Use No     History   Smoking Status    Never Smoker   Smokeless Tobacco    Never Used     Family History   Problem Relation Age of Onset    Seizures Mother     Dysrhythmia Father     Hypertension Father     Stroke Father     Coronary artery disease Paternal Grandfather        Meds/Allergies       Current Outpatient Prescriptions:     cetirizine (ZyrTEC) 10 mg tablet    Multiple Vitamin (MULTIVITAMIN) tablet    omeprazole (PriLOSEC) 20 mg delayed release capsule    polyethylene glycol (GLYCOLAX) powder    lubiprostone (AMITIZA) 24 mcg capsule    Na Sulfate-K Sulfate-Mg Sulf (SUPREP BOWEL PREP KIT) 17 5-3 13-1 6 GM/180ML SOLN    No Known Allergies        Objective     Blood pressure 112/72, pulse 78, temperature 98 2 °F (36 8 °C), temperature source Tympanic, height 5' 5" (1 651 m), weight 61 2 kg (135 lb)  Body mass index is 22 47 kg/m²  PHYSICAL EXAM:      General Appearance:   Alert, cooperative, no distress   HEENT:   Normocephalic, atraumatic, anicteric      Neck:  Supple, symmetrical, trachea midline   Lungs:   Clear to auscultation bilaterally; no rales, rhonchi or wheezing; respirations unlabored    Heart[de-identified]   Regular rate and rhythm; no murmur, rub, or gallop  Abdomen:   Soft, non-tender, non-distended; normal bowel sounds; no masses, no organomegaly    Genitalia:   Deferred    Rectal:   Deferred    Extremities:  No cyanosis, clubbing or edema    Pulses:  2+ and symmetric    Skin:  No jaundice, rashes, or lesions    Lymph nodes:  No palpable cervical lymphadenopathy        Lab Results:   No visits with results within 1 Day(s) from this visit     Latest known visit with results is:   Appointment on 03/06/2018   Component Date Value    Sodium 03/06/2018 142     Potassium 03/06/2018 4 2     Chloride 03/06/2018 107     CO2 03/06/2018 30     Anion Gap 03/06/2018 5     BUN 03/06/2018 20     Creatinine 03/06/2018 0 66     Glucose 03/06/2018 83     Calcium 03/06/2018 8 9     eGFR 03/06/2018 121          Radiology Results:   No results found

## 2018-05-08 ENCOUNTER — ANESTHESIA EVENT (OUTPATIENT)
Dept: GASTROENTEROLOGY | Facility: MEDICAL CENTER | Age: 28
End: 2018-05-08
Payer: COMMERCIAL

## 2018-05-09 ENCOUNTER — ANESTHESIA (OUTPATIENT)
Dept: GASTROENTEROLOGY | Facility: MEDICAL CENTER | Age: 28
End: 2018-05-09
Payer: COMMERCIAL

## 2018-05-09 ENCOUNTER — HOSPITAL ENCOUNTER (OUTPATIENT)
Facility: MEDICAL CENTER | Age: 28
Setting detail: OUTPATIENT SURGERY
Discharge: HOME | End: 2018-05-09
Attending: INTERNAL MEDICINE | Admitting: INTERNAL MEDICINE
Payer: COMMERCIAL

## 2018-05-09 VITALS
SYSTOLIC BLOOD PRESSURE: 103 MMHG | RESPIRATION RATE: 16 BRPM | TEMPERATURE: 98.9 F | OXYGEN SATURATION: 99 % | HEIGHT: 65 IN | DIASTOLIC BLOOD PRESSURE: 59 MMHG | WEIGHT: 135 LBS | BODY MASS INDEX: 22.49 KG/M2 | HEART RATE: 71 BPM

## 2018-05-09 DIAGNOSIS — K59.04 CHRONIC IDIOPATHIC CONSTIPATION: Primary | ICD-10-CM

## 2018-05-09 LAB — EXT PREGNANCY TEST URINE: NEGATIVE

## 2018-05-09 PROCEDURE — 45378 DIAGNOSTIC COLONOSCOPY: CPT | Performed by: INTERNAL MEDICINE

## 2018-05-09 PROCEDURE — 81025 URINE PREGNANCY TEST: CPT | Performed by: ANESTHESIOLOGY

## 2018-05-09 RX ORDER — LUBIPROSTONE 8 UG/1
8 CAPSULE, GELATIN COATED ORAL 2 TIMES DAILY WITH MEALS
Qty: 60 CAPSULE | Refills: 1 | Status: SHIPPED | OUTPATIENT
Start: 2018-05-09 | End: 2019-01-16

## 2018-05-09 RX ORDER — PROPOFOL 10 MG/ML
INJECTION, EMULSION INTRAVENOUS AS NEEDED
Status: DISCONTINUED | OUTPATIENT
Start: 2018-05-09 | End: 2018-05-09 | Stop reason: SURG

## 2018-05-09 RX ORDER — SODIUM CHLORIDE 9 MG/ML
125 INJECTION, SOLUTION INTRAVENOUS CONTINUOUS
Status: DISCONTINUED | OUTPATIENT
Start: 2018-05-09 | End: 2018-05-09 | Stop reason: HOSPADM

## 2018-05-09 RX ADMIN — LIDOCAINE HYDROCHLORIDE 50 MG: 20 INJECTION, SOLUTION INTRAVENOUS at 11:40

## 2018-05-09 RX ADMIN — SODIUM CHLORIDE 125 ML/HR: 0.9 INJECTION, SOLUTION INTRAVENOUS at 11:15

## 2018-05-09 RX ADMIN — PROPOFOL 120 MG: 10 INJECTION, EMULSION INTRAVENOUS at 11:40

## 2018-05-09 RX ADMIN — SODIUM CHLORIDE: 0.9 INJECTION, SOLUTION INTRAVENOUS at 11:40

## 2018-05-09 RX ADMIN — PROPOFOL 100 MG: 10 INJECTION, EMULSION INTRAVENOUS at 11:51

## 2018-05-09 RX ADMIN — PROPOFOL 80 MG: 10 INJECTION, EMULSION INTRAVENOUS at 11:46

## 2018-05-09 NOTE — OP NOTE
**** GI/ENDOSCOPY REPORT ****     PATIENT NAME: Felice Patel ------ VISIT ID:  Patient ID: [id number]   YOB: 1990     INTRODUCTION: Colonoscopy - A 32 female patient presents for an outpatient   Colonoscopy at [address] [place] [address]  PREVIOUS COLONOSCOPY:     INDICATIONS: Constipation  CONSENT:  The benefits, risks, and alternatives to the procedure were   discussed and informed consent was obtained from the patient  PREPARATION: EKG, pulse, pulse oximetry and blood pressure were monitored   throughout the procedure  The patient was identified by myself both   verbally and by visual inspection of ID band  Airway Assessment   Classification: Airway class 2 - Visualization of the soft palate, fauces   and uvula  ASA Classification: See anesthesia record  MEDICATIONS: Anesthesia-check records MAC anesthesia  PROCEDURE:  The endoscope was passed with difficulty through the anus   under direct visualization and advanced to the cecum, confirmed by   appendiceal orifice and ileocecal valve  The scope was withdrawn and the   mucosa was carefully examined  The quality of the preparation was good  Cecal Intubation Time: 8 Minute(s) Scope Withdrawal Time: 6 Minute(s)     RECTAL EXAM: Normal rectal exam      FINDINGS:  The terminal ileum appeared to be normal  The entire colon   appeared to be normal      COMPLICATIONS: There were no complications  IMPRESSIONS: Normal terminal ileum  Normal entire colon  RECOMMENDATIONS: - Adjust amitiza dosage   - repeat colonoscopy if   clinically indicated  ESTIMATED BLOOD LOSS: none  PATHOLOGY SPECIMENS:     PROCEDURE CODES:     ICD-9 Codes: 564 00 Constipation, unspecified     ICD-10 Codes: K59 00 Constipation, unspecified     PERFORMED BY: Dr Coralee Ormond, M D  on 05/09/2018  Version 1, electronically signed by CHAIM Mcmahon  on 05/09/2018 at   12:14

## 2018-05-09 NOTE — ANESTHESIA PREPROCEDURE EVALUATION
Review of Systems/Medical History  Patient summary reviewed  Chart reviewed  No history of anesthetic complications     Cardiovascular  Negative cardio ROS    Pulmonary  Negative pulmonary ROS        GI/Hepatic    Bowel prep  Comment: CIC     Negative  ROS        Endo/Other  Negative endo/other ROS      GYN  Negative gynecology ROS          Hematology  Negative hematology ROS      Musculoskeletal  Negative musculoskeletal ROS        Neurology  Negative neurology ROS      Psychology   Anxiety, Depression , being treated for depression,              Physical Exam    Airway    Mallampati score: I  TM Distance: >3 FB  Neck ROM: full     Dental   No notable dental hx     Cardiovascular  Comment: Negative ROS, Rhythm: regular, Rate: normal, Cardiovascular exam normal    Pulmonary  Pulmonary exam normal Breath sounds clear to auscultation,     Other Findings        Anesthesia Plan  ASA Score- 2     Anesthesia Type- IV sedation with anesthesia with ASA Monitors  Additional Monitors:   Airway Plan:         Plan Factors- Patient instructed to abstain from smoking on day of procedure  Patient did not smoke on day of surgery  Induction- intravenous  Postoperative Plan-     Informed Consent- Anesthetic plan and risks discussed with patient

## 2018-05-09 NOTE — DISCHARGE INSTRUCTIONS
Colonoscopy   WHAT YOU NEED TO KNOW:   A colonoscopy is a procedure to examine the inside of your colon (intestine) with a scope  Polyps or tissue growths may have been removed during your colonoscopy  It is normal to feel bloated and to have some abdominal discomfort  You should be passing gas  If you have hemorrhoids or you had polyps removed, you may have a small amount of bleeding  DISCHARGE INSTRUCTIONS:   Seek care immediately if:   · You have a large amount of bright red blood in your bowel movements  · Your abdomen is hard and firm and you have severe pain  · You have sudden trouble breathing  Contact your healthcare provider if:   · You develop a rash or hives  · You have a fever within 24 hours of your procedure       · You have not had a bowel movement for 3 days after your procedure  · You have questions or concerns about your condition or care  Activity:   · Do not lift, strain, or run  for 3 days after your procedure  · Rest after your procedure  You have been given medicine to relax you  Do not  drive or make important decisions until the day after your procedure  Return to your normal activity as directed  · Relieve gas and discomfort from bloating  by lying on your right side with a heating pad on your abdomen  You may need to take short walks to help the gas move out  Eat small meals until bloating is relieved  If you had polyps removed: For 7 days after your procedure:  · Do not  take aspirin  · Do not  go on long car rides  Follow up with your healthcare provider as directed:  Write down your questions so you remember to ask them during your visits  © 2017 [employer] is for End User's use only and may not be sold, redistributed or otherwise used for commercial purposes  All illustrations and images included in CareNotes® are the copyrighted property of [employer]  or [employer] Santana    The above information is an  only  It is not intended as medical advice for individual conditions or treatments  Talk to your doctor, nurse or pharmacist before following any medical regimen to see if it is safe and effective for you

## 2018-09-01 ENCOUNTER — OFFICE VISIT (OUTPATIENT)
Dept: FAMILY MEDICINE CLINIC | Facility: CLINIC | Age: 28
End: 2018-09-01
Payer: COMMERCIAL

## 2018-09-01 VITALS
HEART RATE: 108 BPM | WEIGHT: 135.5 LBS | DIASTOLIC BLOOD PRESSURE: 66 MMHG | SYSTOLIC BLOOD PRESSURE: 112 MMHG | TEMPERATURE: 98.3 F | OXYGEN SATURATION: 98 % | BODY MASS INDEX: 22.55 KG/M2

## 2018-09-01 DIAGNOSIS — J01.90 ACUTE NON-RECURRENT SINUSITIS, UNSPECIFIED LOCATION: Primary | ICD-10-CM

## 2018-09-01 PROCEDURE — 99214 OFFICE O/P EST MOD 30 MIN: CPT | Performed by: FAMILY MEDICINE

## 2018-09-01 PROCEDURE — 3725F SCREEN DEPRESSION PERFORMED: CPT | Performed by: FAMILY MEDICINE

## 2018-09-01 RX ORDER — AMOXICILLIN AND CLAVULANATE POTASSIUM 875; 125 MG/1; MG/1
1 TABLET, FILM COATED ORAL EVERY 12 HOURS SCHEDULED
Qty: 14 TABLET | Refills: 0 | Status: SHIPPED | OUTPATIENT
Start: 2018-09-01 | End: 2018-09-08

## 2018-09-01 NOTE — PROGRESS NOTES
Assessment/Plan:    1  Acute non-recurrent sinusitis, unspecified location  Start supportive care  Maintain hydration  Take over-the-counter Mucinex symptom relief  Start treatment with Augmentin b i d  for 7 days  Follow up if any symptoms persist   - amoxicillin-clavulanate (AUGMENTIN) 875-125 mg per tablet; Take 1 tablet by mouth every 12 (twelve) hours for 7 days  Dispense: 14 tablet; Refill: 0     There are no diagnoses linked to this encounter  Subjective:    Chief Complaint   Patient presents with    Earache     Bilateral ear pain x 1 week    Cough    Nasal Congestion        Patient ID: Jeff Barton is a 29 y o  female  URI    This is a new problem  The current episode started in the past 7 days  The problem has been unchanged  There has been no fever  Associated symptoms include congestion, coughing, ear pain, headaches, rhinorrhea, sinus pain and a sore throat  Pertinent negatives include no abdominal pain, chest pain, diarrhea, dysuria or nausea  She has tried decongestant for the symptoms  The treatment provided no relief  Review of Systems   Constitutional: Negative for activity change, chills, fatigue and fever  HENT: Positive for congestion, ear pain, rhinorrhea, sinus pain and sore throat  Negative for sinus pressure  Eyes: Negative for redness, itching and visual disturbance  Respiratory: Positive for cough  Negative for shortness of breath  Cardiovascular: Negative for chest pain and palpitations  Gastrointestinal: Negative for abdominal pain, diarrhea and nausea  Endocrine: Negative for cold intolerance and heat intolerance  Genitourinary: Negative for dysuria, flank pain and frequency  Musculoskeletal: Negative for arthralgias, back pain, gait problem and myalgias  Skin: Negative for color change  Allergic/Immunologic: Negative for environmental allergies  Neurological: Positive for headaches  Negative for dizziness and numbness  Psychiatric/Behavioral: Negative for behavioral problems and sleep disturbance  The following portions of the patient's history were reviewed and updated as appropriate : past family history, past medical history, past social history and past surgical history  Current Outpatient Prescriptions:     cetirizine (ZyrTEC) 10 mg tablet, Take 10 mg by mouth daily  , Disp: , Rfl:     Multiple Vitamin (MULTIVITAMIN) tablet, Take 1 tablet by mouth daily  , Disp: , Rfl:     lubiprostone (AMITIZA) 24 mcg capsule, Take 1 capsule (24 mcg total) by mouth 2 (two) times a day with meals (Patient not taking: Reported on 9/1/2018 ), Disp: 60 capsule, Rfl: 2    lubiprostone (AMITIZA) 8 mcg capsule, Take 1 capsule (8 mcg total) by mouth 2 (two) times a day with meals (Patient not taking: Reported on 9/1/2018 ), Disp: 60 capsule, Rfl: 1    Na Sulfate-K Sulfate-Mg Sulf (SUPREP BOWEL PREP KIT) 17 5-3 13-1 6 GM/180ML SOLN, Take 1 Bottle by mouth once for 1 dose, Disp: 1 Bottle, Rfl: 0    omeprazole (PriLOSEC) 20 mg delayed release capsule, Take 1 capsule (20 mg total) by mouth 2 (two) times a day (Patient not taking: Reported on 9/1/2018 ), Disp: 30 capsule, Rfl: 1    Objective:    Vitals:    09/01/18 0923   BP: 112/66   BP Location: Left arm   Patient Position: Sitting   Pulse: (!) 108   Temp: 98 3 °F (36 8 °C)   TempSrc: Tympanic   SpO2: 98%   Weight: 61 5 kg (135 lb 8 oz)        Physical Exam   Constitutional: She is oriented to person, place, and time  She appears well-developed and well-nourished  HENT:   Head: Normocephalic and atraumatic  Nose: Nose normal    Mouth/Throat: No oropharyngeal exudate  Eyes: Pupils are equal, round, and reactive to light  Right eye exhibits no discharge  Left eye exhibits no discharge  Neck: Normal range of motion  Neck supple  No tracheal deviation present  Cardiovascular: Normal rate, regular rhythm and intact distal pulses  Exam reveals no gallop and no friction rub  No murmur heard  Pulses:       Dorsalis pedis pulses are 2+ on the right side, and 2+ on the left side  Posterior tibial pulses are 2+ on the right side, and 2+ on the left side  Pulmonary/Chest: Effort normal and breath sounds normal  No respiratory distress  She has no wheezes  She has no rales  Abdominal: Soft  Bowel sounds are normal  She exhibits no distension  There is no tenderness  There is no rebound and no guarding  Musculoskeletal: Normal range of motion  She exhibits no edema  Lymphadenopathy:        Head (right side): No submental and no submandibular adenopathy present  Head (left side): No submental and no submandibular adenopathy present  She has no cervical adenopathy  Right cervical: No superficial cervical, no deep cervical and no posterior cervical adenopathy present  Left cervical: No superficial cervical, no deep cervical and no posterior cervical adenopathy present  Neurological: She is alert and oriented to person, place, and time  No cranial nerve deficit or sensory deficit  Skin: Skin is warm, dry and intact  Psychiatric: Her speech is normal and behavior is normal  Judgment normal  Her mood appears not anxious  Cognition and memory are normal  She does not exhibit a depressed mood  Vitals reviewed

## 2019-01-16 ENCOUNTER — OFFICE VISIT (OUTPATIENT)
Dept: FAMILY MEDICINE CLINIC | Facility: CLINIC | Age: 29
End: 2019-01-16
Payer: COMMERCIAL

## 2019-01-16 VITALS
HEIGHT: 66 IN | TEMPERATURE: 98.6 F | HEART RATE: 102 BPM | SYSTOLIC BLOOD PRESSURE: 112 MMHG | WEIGHT: 129.2 LBS | RESPIRATION RATE: 16 BRPM | OXYGEN SATURATION: 97 % | DIASTOLIC BLOOD PRESSURE: 72 MMHG | BODY MASS INDEX: 20.76 KG/M2

## 2019-01-16 DIAGNOSIS — J20.9 ACUTE BRONCHITIS, UNSPECIFIED ORGANISM: Primary | ICD-10-CM

## 2019-01-16 PROCEDURE — 99213 OFFICE O/P EST LOW 20 MIN: CPT | Performed by: PHYSICIAN ASSISTANT

## 2019-01-16 PROCEDURE — 3008F BODY MASS INDEX DOCD: CPT | Performed by: PHYSICIAN ASSISTANT

## 2019-01-16 RX ORDER — AZITHROMYCIN 250 MG/1
TABLET, FILM COATED ORAL
Qty: 6 TABLET | Refills: 0 | Status: SHIPPED | OUTPATIENT
Start: 2019-01-16 | End: 2019-01-21

## 2019-01-16 RX ORDER — PREDNISONE 10 MG/1
TABLET ORAL
Qty: 21 TABLET | Refills: 0 | Status: SHIPPED | OUTPATIENT
Start: 2019-01-16 | End: 2019-06-21 | Stop reason: ALTCHOICE

## 2019-01-16 RX ORDER — BENZONATATE 200 MG/1
200 CAPSULE ORAL 3 TIMES DAILY PRN
Qty: 30 CAPSULE | Refills: 0 | Status: SHIPPED | OUTPATIENT
Start: 2019-01-16 | End: 2019-06-21 | Stop reason: ALTCHOICE

## 2019-01-16 NOTE — PROGRESS NOTES
Assessment/Plan:      Diagnoses and all orders for this visit:    Acute bronchitis, unspecified organism  -     azithromycin (ZITHROMAX) 250 mg tablet; Take 2 tabs PO daily x 1, then 1 tab PO daily x 4 days  -     predniSONE 10 mg tablet; 6,5,4,3,2,1  Take in AM with food  -     benzonatate (TESSALON) 200 MG capsule; Take 1 capsule (200 mg total) by mouth 3 (three) times a day as needed for cough      patient is a 49-year-old female presenting today for persistent lower respiratory symptoms  I will treat her with azithromycin in addition to a 6 day taper prednisone with potential side effects discussed  I also prescribed benzonatate for cough suppression  Vicks vapor rub, rest and fluids, steam or warm humidification encouraged  We will see how she responds to treatment and was encouraged to call or follow up with persistent symptoms  Chief Complaint   Patient presents with    Cold Like Symptoms     x 1 week  Cough, heaviness in chest, congestion  Pt taking musinex with no relief       Subjective:     Patient ID: Tanna Sarmiento is a 29 y o  female  29y/o female here today for persistent lower resp sxs past 3 days, worsening  PND x 1 week  Chest pressure, tightness, persistent cough, SOB, exacerbated with walking  Low grade temp 99  Taking mucinex  Review of Systems   Constitutional: Positive for fatigue  Negative for fever  HENT: Negative  Respiratory: Positive for cough, chest tightness and shortness of breath  Cardiovascular: Negative  Gastrointestinal: Positive for diarrhea  Negative for abdominal pain  Neurological: Negative  Psychiatric/Behavioral: Negative  The following portions of the patient's history were reviewed and updated as appropriate: allergies, current medications, past family history, past medical history, past social history, past surgical history and problem list       Objective:     Physical Exam   Constitutional: She appears well-developed   No distress  fatigued   HENT:   Head: Normocephalic  Right Ear: Tympanic membrane and ear canal normal    Left Ear: Tympanic membrane and ear canal normal    Nose: Rhinorrhea present  Mouth/Throat: Oropharynx is clear and moist    Neck: Neck supple  Normal carotid pulses present  Carotid bruit is not present  Cardiovascular: Normal rate, regular rhythm, normal heart sounds and normal pulses  Pulmonary/Chest: She has decreased breath sounds (moderate decreased throughout)  She has no wheezes  She has no rhonchi  She has no rales  Dry hacking cough   Lymphadenopathy:     She has no cervical adenopathy  Neurological: She is alert  Psychiatric: She has a normal mood and affect  Vitals reviewed        Vitals:    01/16/19 1023   BP: 112/72   BP Location: Right arm   Patient Position: Sitting   Cuff Size: Standard   Pulse: 102   Resp: 16   Temp: 98 6 °F (37 °C)   TempSrc: Tympanic   SpO2: 97%   Weight: 58 6 kg (129 lb 3 2 oz)   Height: 5' 5 75" (1 67 m)

## 2019-05-20 ENCOUNTER — OFFICE VISIT (OUTPATIENT)
Dept: FAMILY MEDICINE CLINIC | Facility: CLINIC | Age: 29
End: 2019-05-20
Payer: COMMERCIAL

## 2019-05-20 ENCOUNTER — APPOINTMENT (OUTPATIENT)
Dept: LAB | Facility: CLINIC | Age: 29
End: 2019-05-20
Payer: COMMERCIAL

## 2019-05-20 VITALS
TEMPERATURE: 99.2 F | DIASTOLIC BLOOD PRESSURE: 78 MMHG | OXYGEN SATURATION: 96 % | SYSTOLIC BLOOD PRESSURE: 106 MMHG | HEIGHT: 66 IN | WEIGHT: 133.4 LBS | BODY MASS INDEX: 21.44 KG/M2 | RESPIRATION RATE: 16 BRPM | HEART RATE: 125 BPM

## 2019-05-20 DIAGNOSIS — G25.9 FUNCTIONAL MOVEMENT DISORDER: ICD-10-CM

## 2019-05-20 DIAGNOSIS — R94.6 ABNORMAL THYROID FUNCTION TEST: ICD-10-CM

## 2019-05-20 DIAGNOSIS — F41.8 DEPRESSION WITH ANXIETY: Primary | ICD-10-CM

## 2019-05-20 DIAGNOSIS — F41.8 DEPRESSION WITH ANXIETY: ICD-10-CM

## 2019-05-20 LAB
ALBUMIN SERPL BCP-MCNC: 3.9 G/DL (ref 3.5–5)
ALP SERPL-CCNC: 65 U/L (ref 46–116)
ALT SERPL W P-5'-P-CCNC: 16 U/L (ref 12–78)
ANION GAP SERPL CALCULATED.3IONS-SCNC: 6 MMOL/L (ref 4–13)
AST SERPL W P-5'-P-CCNC: 14 U/L (ref 5–45)
BILIRUB SERPL-MCNC: 0.25 MG/DL (ref 0.2–1)
BUN SERPL-MCNC: 17 MG/DL (ref 5–25)
CALCIUM SERPL-MCNC: 8.6 MG/DL (ref 8.3–10.1)
CHLORIDE SERPL-SCNC: 110 MMOL/L (ref 100–108)
CO2 SERPL-SCNC: 25 MMOL/L (ref 21–32)
CREAT SERPL-MCNC: 0.7 MG/DL (ref 0.6–1.3)
ERYTHROCYTE [DISTWIDTH] IN BLOOD BY AUTOMATED COUNT: 12.6 % (ref 11.6–15.1)
GFR SERPL CREATININE-BSD FRML MDRD: 118 ML/MIN/1.73SQ M
GLUCOSE SERPL-MCNC: 86 MG/DL (ref 65–140)
HCT VFR BLD AUTO: 47.5 % (ref 34.8–46.1)
HGB BLD-MCNC: 15.6 G/DL (ref 11.5–15.4)
MCH RBC QN AUTO: 30.2 PG (ref 26.8–34.3)
MCHC RBC AUTO-ENTMCNC: 32.8 G/DL (ref 31.4–37.4)
MCV RBC AUTO: 92 FL (ref 82–98)
PLATELET # BLD AUTO: 207 THOUSANDS/UL (ref 149–390)
PMV BLD AUTO: 9.2 FL (ref 8.9–12.7)
POTASSIUM SERPL-SCNC: 4.7 MMOL/L (ref 3.5–5.3)
PROT SERPL-MCNC: 7.3 G/DL (ref 6.4–8.2)
RBC # BLD AUTO: 5.17 MILLION/UL (ref 3.81–5.12)
SODIUM SERPL-SCNC: 141 MMOL/L (ref 136–145)
TSH SERPL DL<=0.05 MIU/L-ACNC: 1.44 UIU/ML (ref 0.36–3.74)
WBC # BLD AUTO: 7.75 THOUSAND/UL (ref 4.31–10.16)

## 2019-05-20 PROCEDURE — 36415 COLL VENOUS BLD VENIPUNCTURE: CPT

## 2019-05-20 PROCEDURE — 99214 OFFICE O/P EST MOD 30 MIN: CPT | Performed by: FAMILY MEDICINE

## 2019-05-20 PROCEDURE — 85027 COMPLETE CBC AUTOMATED: CPT

## 2019-05-20 PROCEDURE — 80053 COMPREHEN METABOLIC PANEL: CPT

## 2019-05-20 PROCEDURE — 84443 ASSAY THYROID STIM HORMONE: CPT

## 2019-05-20 RX ORDER — CITALOPRAM 20 MG/1
20 TABLET ORAL DAILY
Qty: 30 TABLET | Refills: 5 | Status: SHIPPED | OUTPATIENT
Start: 2019-05-20 | End: 2019-08-27 | Stop reason: SDUPTHER

## 2019-05-24 ENCOUNTER — OFFICE VISIT (OUTPATIENT)
Dept: BEHAVIORAL/MENTAL HEALTH CLINIC | Facility: CLINIC | Age: 29
End: 2019-05-24
Payer: COMMERCIAL

## 2019-05-24 DIAGNOSIS — F43.12 POST-TRAUMATIC STRESS DISORDER, CHRONIC: ICD-10-CM

## 2019-05-24 DIAGNOSIS — F41.1 GENERALIZED ANXIETY DISORDER: Primary | ICD-10-CM

## 2019-05-24 PROCEDURE — 90834 PSYTX W PT 45 MINUTES: CPT | Performed by: PSYCHIATRY & NEUROLOGY

## 2019-06-21 ENCOUNTER — OFFICE VISIT (OUTPATIENT)
Dept: FAMILY MEDICINE CLINIC | Facility: CLINIC | Age: 29
End: 2019-06-21
Payer: COMMERCIAL

## 2019-06-21 ENCOUNTER — OFFICE VISIT (OUTPATIENT)
Dept: BEHAVIORAL/MENTAL HEALTH CLINIC | Facility: CLINIC | Age: 29
End: 2019-06-21
Payer: COMMERCIAL

## 2019-06-21 VITALS
RESPIRATION RATE: 15 BRPM | DIASTOLIC BLOOD PRESSURE: 82 MMHG | SYSTOLIC BLOOD PRESSURE: 118 MMHG | HEART RATE: 77 BPM | HEIGHT: 66 IN | OXYGEN SATURATION: 97 % | BODY MASS INDEX: 20.83 KG/M2 | TEMPERATURE: 98 F | WEIGHT: 129.6 LBS

## 2019-06-21 DIAGNOSIS — F41.1 GENERALIZED ANXIETY DISORDER: Primary | ICD-10-CM

## 2019-06-21 DIAGNOSIS — F43.12 POST-TRAUMATIC STRESS DISORDER, CHRONIC: ICD-10-CM

## 2019-06-21 DIAGNOSIS — G25.9 FUNCTIONAL MOVEMENT DISORDER: ICD-10-CM

## 2019-06-21 PROCEDURE — 3008F BODY MASS INDEX DOCD: CPT | Performed by: FAMILY MEDICINE

## 2019-06-21 PROCEDURE — 90834 PSYTX W PT 45 MINUTES: CPT | Performed by: PSYCHIATRY & NEUROLOGY

## 2019-06-21 PROCEDURE — 99213 OFFICE O/P EST LOW 20 MIN: CPT | Performed by: FAMILY MEDICINE

## 2019-07-18 ENCOUNTER — OFFICE VISIT (OUTPATIENT)
Dept: FAMILY MEDICINE CLINIC | Facility: CLINIC | Age: 29
End: 2019-07-18
Payer: COMMERCIAL

## 2019-07-18 VITALS
SYSTOLIC BLOOD PRESSURE: 100 MMHG | OXYGEN SATURATION: 97 % | TEMPERATURE: 98 F | WEIGHT: 125.2 LBS | DIASTOLIC BLOOD PRESSURE: 70 MMHG | HEIGHT: 66 IN | HEART RATE: 89 BPM | BODY MASS INDEX: 20.12 KG/M2

## 2019-07-18 DIAGNOSIS — M72.2 PLANTAR FASCIITIS: Primary | ICD-10-CM

## 2019-07-18 PROCEDURE — 99214 OFFICE O/P EST MOD 30 MIN: CPT | Performed by: FAMILY MEDICINE

## 2019-07-18 PROCEDURE — 3008F BODY MASS INDEX DOCD: CPT | Performed by: FAMILY MEDICINE

## 2019-07-18 RX ORDER — PREDNISONE 20 MG/1
60 TABLET ORAL DAILY
Qty: 15 TABLET | Refills: 0 | Status: SHIPPED | OUTPATIENT
Start: 2019-07-18 | End: 2019-09-09 | Stop reason: ALTCHOICE
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
## 2019-07-25 ENCOUNTER — EVALUATION (OUTPATIENT)
Dept: PHYSICAL THERAPY | Facility: CLINIC | Age: 29
End: 2019-07-25
Payer: COMMERCIAL

## 2019-07-25 DIAGNOSIS — M72.2 PLANTAR FASCIITIS: ICD-10-CM

## 2019-07-25 PROCEDURE — 97110 THERAPEUTIC EXERCISES: CPT | Performed by: PHYSICAL THERAPIST

## 2019-07-25 PROCEDURE — 97161 PT EVAL LOW COMPLEX 20 MIN: CPT | Performed by: PHYSICAL THERAPIST
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
## 2019-07-26 ENCOUNTER — SOCIAL WORK (OUTPATIENT)
Dept: BEHAVIORAL/MENTAL HEALTH CLINIC | Facility: CLINIC | Age: 29
End: 2019-07-26
Payer: COMMERCIAL

## 2019-07-26 DIAGNOSIS — F41.1 GENERALIZED ANXIETY DISORDER: ICD-10-CM

## 2019-07-26 DIAGNOSIS — F43.12 POST-TRAUMATIC STRESS DISORDER, CHRONIC: Primary | ICD-10-CM

## 2019-07-26 PROCEDURE — 90834 PSYTX W PT 45 MINUTES: CPT | Performed by: PSYCHIATRY & NEUROLOGY
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
## 2019-07-29 ENCOUNTER — OFFICE VISIT (OUTPATIENT)
Dept: PHYSICAL THERAPY | Facility: CLINIC | Age: 29
End: 2019-07-29
Payer: COMMERCIAL

## 2019-07-29 DIAGNOSIS — M72.2 PLANTAR FASCIITIS: Primary | ICD-10-CM

## 2019-07-29 PROCEDURE — 97110 THERAPEUTIC EXERCISES: CPT

## 2019-07-29 PROCEDURE — 97112 NEUROMUSCULAR REEDUCATION: CPT

## 2019-07-29 PROCEDURE — 97140 MANUAL THERAPY 1/> REGIONS: CPT

## 2019-07-29 NOTE — PROGRESS NOTES
Daily Note     Today's date: 2019  Patient name: Abad Aguilera  : 1990  MRN: [id number]  Referring provider: Prashant Hartmann DO  Dx:   Encounter Diagnosis     ICD-10-CM    1  Plantar fasciitis M72 2                   Subjective:  Pt reports compliance with HEP 3x/day  Pt states pain level 2/10 left plantar foot/heel this AM   Pt reports pain level can increase to 6/10 with activity  Objective: See treatment diary below  Assessment: Tolerated treatment well  Moderate TTP/soft tissue restrictions noted left plantar fascia and medial gastroc/soleus region  Moderate difficulty/discomfort reported with soleus stretch and modified with improved tolerance  Gastroc flexibility slowly increasing  Patient exhibited good technique with therapeutic exercises and would benefit from continued PT      Plan: Continue per plan of care            Precautions: depression, anxiety    Specialty Daily Treatment Diary       Manual        STM/TPR plantar fascia, achilles and calf [facility]                                       Exercise Diary         Great toe extension 5"x10       Towel scrunch 3 min       Standing gastroc  stretch 30"x3       Standing soleus stretch over 1/2 foam 30"x3       Heel raises/toe raises seated 5"x10 each       Hamstring stretch home       Towel stretch 2 min       Self massage with ball 3 min                                                                                                       Modalities        MH- pre 10 min

## 2019-08-01 ENCOUNTER — OFFICE VISIT (OUTPATIENT)
Dept: PHYSICAL THERAPY | Facility: CLINIC | Age: 29
End: 2019-08-01
Payer: COMMERCIAL

## 2019-08-01 DIAGNOSIS — M72.2 PLANTAR FASCIITIS: Primary | ICD-10-CM

## 2019-08-01 PROCEDURE — 97110 THERAPEUTIC EXERCISES: CPT

## 2019-08-01 PROCEDURE — 97112 NEUROMUSCULAR REEDUCATION: CPT

## 2019-08-01 PROCEDURE — 97140 MANUAL THERAPY 1/> REGIONS: CPT

## 2019-08-01 NOTE — PROGRESS NOTES
Daily Note     Today's date: 2019  Patient name: Dejan Chavez  : 1990  MRN: [id number]  Referring provider: Beny Lynn DO  Dx:   Encounter Diagnosis     ICD-10-CM    1  Plantar fasciitis M72 2                   Subjective:  Pt reports compliance with HEP 3x/day  Pt states soreness after therapy not as bad or lasting as long  Pt c/o increase left foot pain after standing/walking activity this AM stating pain level 4/10 pre treatment  Pt reports getting Dr Frida Vivar inserts and wearing today for 1st time  Objective: See treatment diary below  Assessment: Tolerated treatment well  Moderate TTP/soft tissue restrictions left plantar fascia, medial calcaneal and soleus region but slowly decreasing with manual therapy  Tolerance to therapeutic ex improving  Gastroc/soleus flexibility slowly increasing  Decrease pain reported post treatment  Patient exhibited good technique with therapeutic exercises and would benefit from continued PT      Plan: Continue per plan of care            Precautions: depression, anxiety    Specialty Daily Treatment Diary       Manual       STM/TPR plantar fascia, achilles and calf [facility]                                      Exercise Diary        Great toe extension 5"x10 5"x10      Towel scrunch 3 min 3 min      Standing gastroc stretch at block 30"x3 30"x3      Standing soleus stretch over 1/2 foam 30"x3 30"x3      Heel raises/toe raises seated 5"x10 each 5"x10 each      Hamstring stretch home home      Towel stretch 2 min home      Self massage with ball 3 min 3 min                                                                                                      Modalities       MH- pre 10 min 10 min

## 2019-08-05 ENCOUNTER — OFFICE VISIT (OUTPATIENT)
Dept: PHYSICAL THERAPY | Facility: CLINIC | Age: 29
End: 2019-08-05
Payer: COMMERCIAL

## 2019-08-05 DIAGNOSIS — M72.2 PLANTAR FASCIITIS: Primary | ICD-10-CM

## 2019-08-05 PROCEDURE — 97112 NEUROMUSCULAR REEDUCATION: CPT | Performed by: PHYSICAL THERAPIST

## 2019-08-05 PROCEDURE — 97110 THERAPEUTIC EXERCISES: CPT | Performed by: PHYSICAL THERAPIST

## 2019-08-05 PROCEDURE — 97140 MANUAL THERAPY 1/> REGIONS: CPT | Performed by: PHYSICAL THERAPIST

## 2019-08-05 PROCEDURE — 97010 HOT OR COLD PACKS THERAPY: CPT | Performed by: PHYSICAL THERAPIST

## 2019-08-05 NOTE — PROGRESS NOTES
Daily Note     Today's date: 2019  Patient name: Jerome Ramirez  : 1990  MRN: [id number]  Referring provider: Valentin Beltran DO  Dx:   Encounter Diagnosis     ICD-10-CM    1  Plantar fasciitis M72 2                   Subjective:  Pt reports pain in L foot is decreased in the mornings and is currently at 2/10  States pain gradually worsens throughout day  Pt states continued compliance to HEP that she believes has been helping  Pt reports wearing shoe inserts that she purchased last week which have been helping but not as much as the exercises  Objective: See treatment diary below  Assessment: Tolerated treatment well  Progressed heel raises to standing with patient tolerating well and no reports of pain  Pt demonstrating consistent tolerance to exercise progressions with decreased pain reported throughout  Patient with decreased left heel and arch pain post manual therapy addressing reducing medial soft tissue restrictions of medial gastroc/soleus  Patient will benefit from continued PT for pain management for return to prior level of function  Plan: Continue per plan of care            Precautions: depression, anxiety    Specialty Daily Treatment Diary       Manual      STM/TPR plantar fascia, achilles and calf - np    Medial gastroc/soleus STM with MWM GH GH 10 min                                     Exercise Diary       Great toe extension 5"x10 5"x10 :08x10     Towel scrunch 3 min 3 min 3 min     Standing gastroc stretch at block 30"x3 30"x3 :30x3     Standing soleus stretch over 1/2 foam 30"x3 30"x3 :30x3     Heel raises/toe raises seated 5"x10 each 5"x10 each :05x15 standing B     Hamstring stretch home home home     Towel stretch 2 min home home     Self massage with ball 3 min 3 min 3 min                                                                                                     Modalities      MH- pre 10 min 10 min 10 min

## 2019-08-08 ENCOUNTER — OFFICE VISIT (OUTPATIENT)
Dept: PHYSICAL THERAPY | Facility: CLINIC | Age: 29
End: 2019-08-08
Payer: COMMERCIAL

## 2019-08-08 DIAGNOSIS — M72.2 PLANTAR FASCIITIS: Primary | ICD-10-CM

## 2019-08-08 PROCEDURE — 97112 NEUROMUSCULAR REEDUCATION: CPT

## 2019-08-08 PROCEDURE — 97110 THERAPEUTIC EXERCISES: CPT

## 2019-08-08 PROCEDURE — 97140 MANUAL THERAPY 1/> REGIONS: CPT

## 2019-08-08 NOTE — PROGRESS NOTES
Daily Note     Today's date: 2019  Patient name: Cami Fiore  : 1990  MRN: [id number]  Referring provider: Beverlie Olszewski, DO  Dx:   Encounter Diagnosis     ICD-10-CM    1  Plantar fasciitis M72 2                   Subjective: Patient noted no new complaints  Objective: See treatment diary below      Assessment: Tolerated treatment well  Patient exhibited good technique with therapeutic exercises and would benefit from continued PT  Patient noted tenderness today in L foot pre and post treatment and with STM  Patient presented with a bruise on lateral calf therefore went gentle over bruise with STM  Inadvertently missed MHP at start of treatment performed at end of treatment today  Plan: Continue per plan of care          Precautions: depression, anxiety    Specialty Daily Treatment Diary       Manual     STM/TPR plantar fascia, achilles and calf - np    Medial gastroc/soleus STM with MWM GH GH 10 min 10 min STM plantar fascia, calf                                     Exercise Diary      Great toe extension 5"x10 5"x10 :08x10 08" x10    Towel scrunch 3 min 3 min 3 min 3 min    Standing gastroc stretch at block 30"x3 30"x3 :30x3 30"x3    Standing soleus stretch over 1/2 foam 30"x3 30"x3 :30x3 30"x3    Heel raises/toe raises seated 5"x10 each 5"x10 each :05x15 standing B :05x15 standing B    Hamstring stretch home home home home    Towel stretch 2 min home home home    Self massage with ball 3 min 3 min 3 min 3 min                                                                                                    Modalities     MH- pre 10 min 10 min 10 min 10 min

## 2019-08-12 ENCOUNTER — OFFICE VISIT (OUTPATIENT)
Dept: PHYSICAL THERAPY | Facility: CLINIC | Age: 29
End: 2019-08-12
Payer: COMMERCIAL

## 2019-08-12 DIAGNOSIS — M72.2 PLANTAR FASCIITIS: Primary | ICD-10-CM

## 2019-08-12 PROCEDURE — 97140 MANUAL THERAPY 1/> REGIONS: CPT | Performed by: PHYSICAL THERAPIST

## 2019-08-12 PROCEDURE — 97010 HOT OR COLD PACKS THERAPY: CPT | Performed by: PHYSICAL THERAPIST

## 2019-08-12 PROCEDURE — 97110 THERAPEUTIC EXERCISES: CPT | Performed by: PHYSICAL THERAPIST

## 2019-08-12 PROCEDURE — 97112 NEUROMUSCULAR REEDUCATION: CPT | Performed by: PHYSICAL THERAPIST

## 2019-08-12 NOTE — PROGRESS NOTES
Daily Note     Today's date: 2019  Patient name: Eloisa Hagen  : 1990  MRN: [id number]  Referring provider: Atiya Meraz DO  Dx:   Encounter Diagnosis     ICD-10-CM    1  Plantar fasciitis M72 2                    Subjective: Patient reports increased left foot pain today that started over the weekend because she did increased activity including walking a mile on Friday and Saturday  Patient states the pain is mostly in the arch of her foot and throughout the day extends to around her heel  Patient reports the home stretches have been helping  Objective: See treatment diary below      Assessment: Tolerated treatment well  Patient with some increased pain throughout TE including towel scrunches and short foot exercise but reduced with stretches  Patient requring minimal cues for good form throughout treatment and will benefit from continued  PT to address pain and decreased tolerance to activity  Patient with 3/10 pain pre-manual therapy with positive response to medial gastroc/soleus STM reporting 1/10 pain in the heel during ambulation post-treatment  Plan: Continue per plan of care    Re-initiate standing heel raises, continue STM to medial gastroc/soleus        Precautions: depression, anxiety    Specialty Daily Treatment Diary         Manual    STM/TPR plantar fascia, achilles and calf - np    Medial gastroc/soleus STM with MWM GH GH 10 min 10 min STM plantar fascia, calf  10 min medial gastroc/soleus STM with MWM                                    Exercise Diary     Great toe extension 5"x10 5"x10 :08x10 08" x10 :10x15   Towel scrunch 3 min 3 min 3 min 3 min 4 min   Standing gastroc stretch at block 30"x3 30"x3 :30x3 30"x3 :30x3   Standing soleus stretch over 1/2 foam 30"x3 30"x3 :30x3 30"x3 :30x3   Heel raises/toe raises seated 5"x10 each 5"x10 each :05x15 standing B :05x15 standing B held   Hamstring stretch home home home home home   Towel stretch 2 min home home home home   Self massage with ball 3 min 3 min 3 min 3 min 3 min   Short foot exercise     :05x10                                                                                           Modalities 7/29 8/1 8/5 8/8 8/12   MH- pre 10 min 10 min 10 min 10 min 10 min

## 2019-08-15 ENCOUNTER — OFFICE VISIT (OUTPATIENT)
Dept: PHYSICAL THERAPY | Facility: CLINIC | Age: 29
End: 2019-08-15
Payer: COMMERCIAL

## 2019-08-15 DIAGNOSIS — M72.2 PLANTAR FASCIITIS: Primary | ICD-10-CM

## 2019-08-15 PROCEDURE — 97110 THERAPEUTIC EXERCISES: CPT

## 2019-08-15 PROCEDURE — 97112 NEUROMUSCULAR REEDUCATION: CPT

## 2019-08-15 PROCEDURE — 97140 MANUAL THERAPY 1/> REGIONS: CPT

## 2019-08-15 NOTE — PROGRESS NOTES
Daily Note     Today's date: 8/15/2019  Patient name: Emely Sarabia  : 1990  MRN: [id number]  Referring provider: Prema Desai DO  Dx:   Encounter Diagnosis     ICD-10-CM    1  Plantar fasciitis M72 2                    Subjective: Patient cont's to report increase left foot pain in AM when getting out of bed and after increase walking activity  Pt states pain level increased to 6-7/10 after walking 1 mile; 2-3/10 with decreased activity  Pt states home ex is getting a little easier  Objective: See treatment diary below      Assessment: Tolerated treatment well  Cont'd TTP/soft tissue restriction left plantar fascia and medial gastroc/soleus regions but slowly decreasing with manual therapy  Pt has good understanding of ex program and demonstrates proper technique  Gastroc/soleus and foot mobility improving  Decrease soreness reported post treatment  Patient will benefit from continued PT to address pain and decreased tolerance to activity  Plan: Continue per plan of care          Precautions: depression, anxiety    Specialty Daily Treatment Diary         Manual 8/15 8/1 8/5 8/8 8/12   STM plantar fascia    Medial gastroc/soleus STM with MWM 15 min GH GH 10 min 10 min STM plantar fascia, calf  10 min medial gastroc/soleus STM with MWM                                    Exercise Diary  8/15 8/1 8/5 8/8 8/12   Great toe extension 5"x10 5"x10 :08x10 08" x10 :10x15   Towel scrunch 3 min 3 min 3 min 3 min 4 min   Standing gastroc stretch at block 30"x3 30"x3 :30x3 30"x3 :30x3   Standing functional soleus stretch over 1/2 foam 10"x10 30"x3 :30x3 30"x3 :30x3   Heel raises/toe raises seated home 5"x10 each :05x15 standing B :05x15 standing B held   Hamstring stretch home home home home home   Towel stretch home home home home home   Self massage with ball 3 min 3 min 3 min 3 min 3 min   Short foot exercise 5"x10    :05x10 Modalities 8/15 8/1 8/5 8/8 8/12   MH- pre 10 min 10 min 10 min 10 min 10 min

## 2019-08-19 ENCOUNTER — APPOINTMENT (OUTPATIENT)
Dept: PHYSICAL THERAPY | Facility: CLINIC | Age: 29
End: 2019-08-19
Payer: COMMERCIAL

## 2019-08-22 ENCOUNTER — OFFICE VISIT (OUTPATIENT)
Dept: PHYSICAL THERAPY | Facility: CLINIC | Age: 29
End: 2019-08-22
Payer: COMMERCIAL

## 2019-08-22 DIAGNOSIS — M72.2 PLANTAR FASCIITIS: Primary | ICD-10-CM

## 2019-08-22 PROCEDURE — 97110 THERAPEUTIC EXERCISES: CPT

## 2019-08-22 PROCEDURE — 97014 ELECTRIC STIMULATION THERAPY: CPT

## 2019-08-22 PROCEDURE — 97112 NEUROMUSCULAR REEDUCATION: CPT

## 2019-08-22 PROCEDURE — 97140 MANUAL THERAPY 1/> REGIONS: CPT

## 2019-08-22 NOTE — PROGRESS NOTES
Daily Note     Today's date: 2019  Patient name: Paula Traylor  : 1990  MRN: [id number]  Referring provider: Jacquie Moura DO  Dx:   Encounter Diagnosis     ICD-10-CM    1  Plantar fasciitis M72 2                    Subjective: Pt reports >50% improvement overall with decrease intensity of left foot pain  Pt c/o increase left heel/foot pain with walking yesterday  Pt states pain level 3/10 at present and increasing to 5/10 at worst       Objective:  See treatment diary below  Added H-wave post treatment to decrease pain  Pt seen x8 visits with FOTO outcome measure 49 at IE and 77 this visit  Assessment: Tolerated treatment well  Cont'd TTP/soft tissue restriction left plantar fascia and medial calcaneal region; decreased TTP medial gastroc/soleus region and improved flexibility noted  Pt has good understanding of ex program and demonstrates proper technique  Good response to added H-wave reporting decrease pain with walking post treatment  Patient will benefit from continued PT to address pain and decreased tolerance to activity  Plan: Continue per plan of care  Pt unable to make appts next week secondary to family conflict but scheduled for the following week  Pt will continue with HEP           Precautions: depression, anxiety    Specialty Daily Treatment Diary         Manual 8/15 8/22 8/5 8/8 8/12   STM plantar fascia    Medial gastroc/soleus STM with MWM 15 min GH GH 10 min 10 min STM plantar fascia, calf  10 min medial gastroc/soleus STM with MWM                                    Exercise Diary  8/15 8/22 8/5 8/8 8/12   Great toe extension  5"x10 10"x10 :08x10 08" x10 :10x15   Towel scrunch 3 min 3 min 3 min 3 min 4 min   Standing gastroc stretch at block 30"x3 30"x3 :30x3 30"x3 :30x3   Standing functional soleus stretch over 1/2 foam 10"x10 10'x10 :30x3 30"x3 :30x3   Heel raises/toe raises seated home home :05x15 standing B :05x15 standing B held   Hamstring stretch home home home home home   Towel stretch home home home home home   Self massage with ball 3 min 3 min 3 min 3 min 3 min   Short foot exercise 5"x10 x8   :05x10                                                                                           Modalities 8/15 8/22 8/5 8/8 8/12   MH- pre 10 min 10 min 10 min 10 min 10 min   H-wave (high/low setting) post tx  10 min

## 2019-08-23 ENCOUNTER — SOCIAL WORK (OUTPATIENT)
Dept: BEHAVIORAL/MENTAL HEALTH CLINIC | Facility: CLINIC | Age: 29
End: 2019-08-23
Payer: COMMERCIAL

## 2019-08-23 DIAGNOSIS — F41.1 GENERALIZED ANXIETY DISORDER: Primary | ICD-10-CM

## 2019-08-23 DIAGNOSIS — F43.12 POST-TRAUMATIC STRESS DISORDER, CHRONIC: ICD-10-CM

## 2019-08-23 PROCEDURE — 90834 PSYTX W PT 45 MINUTES: CPT | Performed by: PSYCHIATRY & NEUROLOGY

## 2019-08-23 NOTE — PSYCH
Assessment/Plan:      Diagnoses and all orders for this visit:    Generalized anxiety disorder    Post-traumatic stress disorder, chronic        Session time 930-1011 (total time 48 minutes)  Subjective:     Patient ID: Davion Fair is a 34 y o  female  HPI Met with Estrada Taylor for follow up  She shared that she is feeling more depressed and anxious lately, having two seizures last week and one this week as well  She said that she feels like her catastrophic thinking is getting worse and she is having increased nightmares as well  She and her  have a trip to [place] planned for end of September so that she can see her old house and try to gain closure with her mother's death  Her brother, whom she has not seen for 8 or 9 years and only talks to briefly through text, will be there at the same time to let her in the house  She expressed feeling like she does not want to go at all, but her  is not letting her back out, as he feels she needs the closure  Discussed her thoughts on how the trip will go, what might be difficult about it and what she might gain  Estrada Taylor will try to focus on the long term benefits of the trip and the closure it could bring her  Because her depressive symptoms of anhedonia, isolating herself and poor motivation are getting worse again, and due to increased anxiety, she will make appointment with Dr Annette Hicks to discuss increase in dose of meds  Review of Systems      Objective:     Physical Exam    Psychiatric: calm and cooperative with good eye contact; mood anxious and depressed; affect congruent with mood; thought process tangential; content normal, no overt delusions; speech and behavior normal; concentration mildly impaired; good insight and judgment; denies SI HI and psychosis

## 2019-08-26 ENCOUNTER — TELEPHONE (OUTPATIENT)
Dept: FAMILY MEDICINE CLINIC | Facility: CLINIC | Age: 29
End: 2019-08-26

## 2019-08-26 NOTE — TELEPHONE ENCOUNTER
Pt called stating she was in to see Yue Dey and was advised that she may need to increase her dose for Celexa 20 mg taking one tab daily  Pt states she feels her anxiety has increased and or she may be getting used to the dosage that she in on  Will Pt need an appt to discuss dosing? Pt's last office visit was 6/21/2019  If not please send Rx to [facility]

## 2019-08-26 NOTE — TELEPHONE ENCOUNTER
Please call patient, please advise her that if she would like/need to increase the dose of this medication, so by increasing it to 30 mg daily    Thank you

## 2019-08-27 DIAGNOSIS — F41.8 DEPRESSION WITH ANXIETY: ICD-10-CM

## 2019-08-27 DIAGNOSIS — R94.6 ABNORMAL THYROID FUNCTION TEST: ICD-10-CM

## 2019-08-27 DIAGNOSIS — G25.9 FUNCTIONAL MOVEMENT DISORDER: ICD-10-CM

## 2019-08-27 RX ORDER — CITALOPRAM 20 MG/1
30 TABLET ORAL DAILY
Qty: 45 TABLET | Refills: 5 | Status: SHIPPED | OUTPATIENT
Start: 2019-08-27 | End: 2020-03-02 | Stop reason: SDUPTHER

## 2019-09-09 ENCOUNTER — OFFICE VISIT (OUTPATIENT)
Dept: FAMILY MEDICINE CLINIC | Facility: CLINIC | Age: 29
End: 2019-09-09
Payer: COMMERCIAL

## 2019-09-09 VITALS
HEART RATE: 81 BPM | RESPIRATION RATE: 17 BRPM | TEMPERATURE: 98.3 F | SYSTOLIC BLOOD PRESSURE: 110 MMHG | BODY MASS INDEX: 20.28 KG/M2 | HEIGHT: 66 IN | OXYGEN SATURATION: 93 % | DIASTOLIC BLOOD PRESSURE: 62 MMHG | WEIGHT: 126.2 LBS

## 2019-09-09 DIAGNOSIS — J20.9 ACUTE BRONCHITIS, UNSPECIFIED ORGANISM: Primary | ICD-10-CM

## 2019-09-09 PROBLEM — R10.11 INTERMITTENT RIGHT UPPER QUADRANT ABDOMINAL PAIN: Status: RESOLVED | Noted: 2018-01-20 | Resolved: 2019-09-09

## 2019-09-09 PROBLEM — R11.0 NAUSEA: Status: RESOLVED | Noted: 2018-01-20 | Resolved: 2019-09-09

## 2019-09-09 PROCEDURE — 99213 OFFICE O/P EST LOW 20 MIN: CPT | Performed by: PHYSICIAN ASSISTANT

## 2019-09-09 RX ORDER — AZITHROMYCIN 250 MG/1
500 TABLET, FILM COATED ORAL EVERY 24 HOURS
Qty: 10 TABLET | Refills: 0 | Status: SHIPPED | OUTPATIENT
Start: 2019-09-09 | End: 2019-09-14

## 2019-09-09 RX ORDER — PREDNISONE 20 MG/1
40 TABLET ORAL DAILY
Qty: 10 TABLET | Refills: 0 | Status: SHIPPED | OUTPATIENT
Start: 2019-09-09 | End: 2019-09-14

## 2019-09-09 RX ORDER — BENZONATATE 100 MG/1
100 CAPSULE ORAL 3 TIMES DAILY PRN
Qty: 20 CAPSULE | Refills: 0 | Status: SHIPPED | OUTPATIENT
Start: 2019-09-09 | End: 2020-03-12

## 2019-09-09 NOTE — PROGRESS NOTES
Assessment/Plan:    1  Acute bronchitis, unspecified organism  Will cover with course of Z-Claude and prednisone  Finish course completely  Recommended probiotic  No NSAIDs while on prednisone  Tessalon Perles as needed for cough  Plenty of fluids and rest   Recommended warm tea with honey  Tylenol as needed  Return to care if symptoms worsen or fail to improve  - azithromycin (ZITHROMAX) 250 mg tablet; Take 2 tablets (500 mg total) by mouth every 24 hours for 5 days  Dispense: 10 tablet; Refill: 0  - predniSONE 20 mg tablet; Take 2 tablets (40 mg total) by mouth daily for 5 days  Dispense: 10 tablet; Refill: 0  - benzonatate (TESSALON PERLES) 100 mg capsule; Take 1 capsule (100 mg total) by mouth 3 (three) times a day as needed for cough  Dispense: 20 capsule; Refill: 0    No problem-specific Assessment & Plan notes found for this encounter  Patient Active Problem List   Diagnosis    Chronic idiopathic constipation    Functional movement disorder    Generalized anxiety disorder    Post-traumatic stress disorder, chronic     Subjective:      Patient ID: Dexter Verduzco is a 34 y o  female  Patient is here complaining of a constant cough for 2 weeks  She states her symptoms started as a cold  She was congested, had postnasal drip, and a cough  She states her symptoms resolved within a week  For the last week she has developed a constant dry and productive cough  States she is bringing up different colored mucus that is sometimes red tinged  Denies fever and chills  Denies earaches  She is no longer congested in denies postnasal drip  States her throat is sore from the cough  Denies wheezing  Admits to shortness of breath  Admits to chest tightness, but denies chest pain  Denies palpitations  States she goes through coughing fits  She has taking plain mucus to little relief  States her sons were sick with colds, but are better now  Denies travel history        The following portions of the patient's history were reviewed and updated as appropriate: allergies, current medications, past family history, past medical history, past social history, past surgical history and problem list     Review of Systems   Constitutional: Positive for fatigue  Negative for appetite change, chills and fever  HENT: Positive for congestion, sore throat and voice change  Negative for ear pain, postnasal drip, rhinorrhea, sinus pressure, sinus pain and trouble swallowing  Respiratory: Positive for cough, chest tightness and shortness of breath  Negative for wheezing  Cardiovascular: Negative for chest pain, palpitations and leg swelling  Gastrointestinal: Negative for abdominal pain, diarrhea, nausea and vomiting  Musculoskeletal: Negative for arthralgias and myalgias  Skin: Negative for rash  Allergic/Immunologic: Positive for food allergies  Negative for environmental allergies  Neurological: Negative for dizziness, light-headedness and headaches  Hematological: Negative for adenopathy  Objective:      /62 (BP Location: Left arm, Patient Position: Sitting)   Pulse 81   Temp 98 3 °F (36 8 °C) (Tympanic)   Resp 17   Ht 5' 6" (1 676 m)   Wt 57 2 kg (126 lb 3 2 oz)   LMP 09/02/2019   SpO2 93%   BMI 20 37 kg/m²          Physical Exam   Constitutional: She is oriented to person, place, and time  She appears well-developed and well-nourished  She appears ill  HENT:   Head: Normocephalic and atraumatic  Right Ear: Hearing, tympanic membrane, external ear and ear canal normal    Left Ear: Hearing, tympanic membrane, external ear and ear canal normal    Nose: Nose normal    Mouth/Throat: Uvula is midline, oropharynx is clear and moist and mucous membranes are normal    Eyes: Pupils are equal, round, and reactive to light  Conjunctivae are normal    Neck: Normal range of motion  Neck supple     Cardiovascular: Normal rate, regular rhythm, S1 normal, S2 normal, normal heart sounds, intact distal pulses and normal pulses  Pulmonary/Chest: Effort normal and breath sounds normal    Persistent dry cough  Abdominal: Soft  Normal appearance and bowel sounds are normal  She exhibits no mass  There is no hepatosplenomegaly  There is no tenderness  Musculoskeletal: Normal range of motion  Lymphadenopathy:     She has no cervical adenopathy  Neurological: She is alert and oriented to person, place, and time  Skin: Skin is warm, dry and intact  No rash noted  Psychiatric: She has a normal mood and affect  Her behavior is normal  Judgment and thought content normal    Nursing note and vitals reviewed  Current Outpatient Medications on File Prior to Visit   Medication Sig Dispense Refill    citalopram (CeleXA) 20 mg tablet Take 1 5 tablets (30 mg total) by mouth daily 45 tablet 5    Multiple Vitamin (MULTIVITAMIN) tablet Take 1 tablet by mouth daily   [DISCONTINUED] lubiprostone (AMITIZA) 24 mcg capsule Take 1 capsule (24 mcg total) by mouth 2 (two) times a day with meals (Patient not taking: Reported on 9/1/2018 ) 60 capsule 2    [DISCONTINUED] Na Sulfate-K Sulfate-Mg Sulf (SUPREP BOWEL PREP KIT) 17 5-3 13-1 6 GM/180ML SOLN Take 1 Bottle by mouth once for 1 dose 1 Bottle 0    [DISCONTINUED] predniSONE 20 mg tablet Take 3 tablets (60 mg total) by mouth daily (Patient not taking: Reported on 9/9/2019) 15 tablet 0     No current facility-administered medications on file prior to visit

## 2019-09-13 ENCOUNTER — SOCIAL WORK (OUTPATIENT)
Dept: BEHAVIORAL/MENTAL HEALTH CLINIC | Facility: CLINIC | Age: 29
End: 2019-09-13

## 2019-09-13 DIAGNOSIS — F41.1 GENERALIZED ANXIETY DISORDER: ICD-10-CM

## 2019-09-13 DIAGNOSIS — F43.12 POST-TRAUMATIC STRESS DISORDER, CHRONIC: Primary | ICD-10-CM

## 2019-09-13 PROCEDURE — 90834 PSYTX W PT 45 MINUTES: CPT | Performed by: PSYCHIATRY & NEUROLOGY

## 2019-09-16 NOTE — PSYCH
Assessment/Plan:      Diagnoses and all orders for this visit:    Post-traumatic stress disorder, chronic    Generalized anxiety disorder      Session time 5211-5009 (total time 45 minutes)    Subjective:     Patient ID: Mariah Jason is a 34 y o  female  HPI Met with Mykel Rojas for follow up  She shared that over the last couple of weeks she and her family were able to go on two short vacations, which were nice  Her trip to [place] is at the end of September, and her , kids and mother in law are all going, so that her MIL can watch the boys while she and her  go to her mom's house and meet her brother  She talked about feeling emotionally somewhat numb to the upcoming event, not feeling particularly upset about it but not excited either  She has been having nightmares a lot lately, and while she mainly does not remember them when she wakes up lately, the emotional feeling lingers and she is "off" for the rest of the day  Discussed coping strategies such as journaling (even if she does not remember her dreams), utilizing her "pick a happy dream" strategy that she currently uses when she is trying to go to sleep in the mornings as well, to try and redirect her feelings/thoughts to something more comforting  She will work on these strategies over the next week, and will return for follow up, prior to her trip  Review of Systems      Objective:     Physical Exam    Psychiatric: calm and cooperative with intermittent eye contact; mood anxious; affect incongruent with mood-smiling more often today when talking about disturbing memories/emotions; thought process logical and organized; speech and behavior normal except as noted above; concentration intact; insight and judgment intact; denies SI HI and psychosis

## 2019-09-20 ENCOUNTER — SOCIAL WORK (OUTPATIENT)
Dept: BEHAVIORAL/MENTAL HEALTH CLINIC | Facility: CLINIC | Age: 29
End: 2019-09-20
Payer: COMMERCIAL

## 2019-09-20 DIAGNOSIS — F41.1 GENERALIZED ANXIETY DISORDER: ICD-10-CM

## 2019-09-20 DIAGNOSIS — F43.12 POST-TRAUMATIC STRESS DISORDER, CHRONIC: Primary | ICD-10-CM

## 2019-09-20 PROCEDURE — 90834 PSYTX W PT 45 MINUTES: CPT | Performed by: PSYCHIATRY & NEUROLOGY
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
## 2019-10-04 ENCOUNTER — SOCIAL WORK (OUTPATIENT)
Dept: BEHAVIORAL/MENTAL HEALTH CLINIC | Facility: CLINIC | Age: 29
End: 2019-10-04
Payer: COMMERCIAL

## 2019-10-04 DIAGNOSIS — F41.1 GENERALIZED ANXIETY DISORDER: ICD-10-CM

## 2019-10-04 DIAGNOSIS — F43.12 POST-TRAUMATIC STRESS DISORDER, CHRONIC: Primary | ICD-10-CM

## 2019-10-04 PROCEDURE — 90834 PSYTX W PT 45 MINUTES: CPT | Performed by: PSYCHIATRY & NEUROLOGY
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
## 2019-10-11 ENCOUNTER — SOCIAL WORK (OUTPATIENT)
Dept: BEHAVIORAL/MENTAL HEALTH CLINIC | Facility: CLINIC | Age: 29
End: 2019-10-11
Payer: COMMERCIAL

## 2019-10-11 DIAGNOSIS — F43.12 POST-TRAUMATIC STRESS DISORDER, CHRONIC: Primary | ICD-10-CM

## 2019-10-11 DIAGNOSIS — F41.1 GENERALIZED ANXIETY DISORDER: ICD-10-CM

## 2019-10-11 PROCEDURE — 90834 PSYTX W PT 45 MINUTES: CPT | Performed by: PSYCHIATRY & NEUROLOGY
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
## 2019-10-18 ENCOUNTER — SOCIAL WORK (OUTPATIENT)
Dept: BEHAVIORAL/MENTAL HEALTH CLINIC | Facility: CLINIC | Age: 29
End: 2019-10-18
Payer: COMMERCIAL

## 2019-10-18 DIAGNOSIS — F41.1 GENERALIZED ANXIETY DISORDER: Primary | ICD-10-CM

## 2019-10-18 DIAGNOSIS — F43.12 POST-TRAUMATIC STRESS DISORDER, CHRONIC: ICD-10-CM

## 2019-10-18 PROCEDURE — 90834 PSYTX W PT 45 MINUTES: CPT | Performed by: PSYCHIATRY & NEUROLOGY

## 2019-10-18 NOTE — PSYCH
Assessment/Plan:      Diagnoses and all orders for this visit:    Generalized anxiety disorder    Post-traumatic stress disorder, chronic      Session time [id number] (total time 45 minutes)    Subjective:     Patient ID: Vivien Cruz is a 34 y o  female  HPI Met with Michelet Small for follow up  She shared that she worked on her "homework" from last week and said a simple thank you when someone complimented her on a shirt she was wearing last week  She said that this was not as difficult to do as she thought it would be, but then when her son's therapist complimented her on something, she found herself deflecting again  She will continue to work on this, as she was happy with her small success  She has not had a chance to work through thoughts when someone gets angry, as she has had a relatively quiet week other than her kids being difficult  Her son Suellen Houston is struggling behaviorally, and Benigno Cho is not sleeping so she has been more focused on their care and her lack of sleep  She did share that she had an "olfactory hallucination" of the smell of her mother's house on Wednesday, which hit her very suddenly and unexpectedly, to the point of her vomiting because it was so strong  This experience left her feeling very angry, which is often how she feels when this happens, and she tried to express her anger by "stabbing a box with scissors "  However, although this worked in [place] when she was angry, it did not have the same effect this time  She said that the hallucinations only last a couple of minutes, but it is difficult to distract herself from them because the smell is so potent  Even smelling things like candles or some strong perfume does not help the smell to leave her  She has to just let it pass    Discussed grounding techniques to help move her out of the experience, such as smelling another abrasive scent like Pine Sol or such, to see if that helps, as well as breathing, reminding herself she is in the here and now, etc   She will work on these strategies this week and return in one week for follow up  Review of Systems      Objective:     Physical Exam    Psychiatric: calm and cooperative with good eye contact; mood anxious, affect congruent with mood; thought process and content normal; speech and behavior normal; concentration and memory grossly intact; good insight and judgment; denies SI HI and psychosis

## 2019-10-25 ENCOUNTER — SOCIAL WORK (OUTPATIENT)
Dept: BEHAVIORAL/MENTAL HEALTH CLINIC | Facility: CLINIC | Age: 29
End: 2019-10-25
Payer: COMMERCIAL

## 2019-10-25 DIAGNOSIS — F41.1 GENERALIZED ANXIETY DISORDER: Primary | ICD-10-CM

## 2019-10-25 DIAGNOSIS — F43.12 POST-TRAUMATIC STRESS DISORDER, CHRONIC: ICD-10-CM

## 2019-10-25 PROCEDURE — 90834 PSYTX W PT 45 MINUTES: CPT | Performed by: PSYCHIATRY & NEUROLOGY
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
## 2019-11-06 ENCOUNTER — OFFICE VISIT (OUTPATIENT)
Dept: FAMILY MEDICINE CLINIC | Facility: CLINIC | Age: 29
End: 2019-11-06
Payer: COMMERCIAL

## 2019-11-06 VITALS
WEIGHT: 126.6 LBS | RESPIRATION RATE: 16 BRPM | TEMPERATURE: 98.4 F | BODY MASS INDEX: 20.34 KG/M2 | HEART RATE: 86 BPM | HEIGHT: 66 IN | OXYGEN SATURATION: 99 % | DIASTOLIC BLOOD PRESSURE: 70 MMHG | SYSTOLIC BLOOD PRESSURE: 102 MMHG

## 2019-11-06 DIAGNOSIS — J02.9 PHARYNGITIS, UNSPECIFIED ETIOLOGY: Primary | ICD-10-CM

## 2019-11-06 DIAGNOSIS — Z23 NEED FOR IMMUNIZATION AGAINST INFLUENZA: ICD-10-CM

## 2019-11-06 LAB — S PYO AG THROAT QL: NEGATIVE

## 2019-11-06 PROCEDURE — 87880 STREP A ASSAY W/OPTIC: CPT | Performed by: FAMILY MEDICINE

## 2019-11-06 PROCEDURE — 99213 OFFICE O/P EST LOW 20 MIN: CPT | Performed by: FAMILY MEDICINE

## 2019-11-06 PROCEDURE — 3008F BODY MASS INDEX DOCD: CPT | Performed by: FAMILY MEDICINE

## 2019-11-06 PROCEDURE — 90686 IIV4 VACC NO PRSV 0.5 ML IM: CPT | Performed by: FAMILY MEDICINE

## 2019-11-06 PROCEDURE — 90471 IMMUNIZATION ADMIN: CPT | Performed by: FAMILY MEDICINE

## 2019-11-06 RX ORDER — AMOXICILLIN AND CLAVULANATE POTASSIUM 875; 125 MG/1; MG/1
1 TABLET, FILM COATED ORAL EVERY 12 HOURS SCHEDULED
Qty: 14 TABLET | Refills: 0 | Status: SHIPPED | OUTPATIENT
Start: 2019-11-06 | End: 2019-11-13

## 2019-11-06 NOTE — PROGRESS NOTES
Assessment/Plan:    Patient is 19-year-old female seen with complaint of sore throats for 6 days  She was running a fever yesterday  Her fever does dissipate with seen medicine but it does not help her throat pain  I did  Do a strep screen in the office and it was negative  I will send out a throat culture to the lab  Patient states that when she clears her throat, sometimes she gets greenish mucus  She does not have any facial pressure  I did give her a printed prescription for generic Augmentin in case the strep screen comes back positive or her fever continues and we want to treat a sinus infection  She should call if she is not improving  I also did check her back as she was concerned about some nodules  There freely movable and I believe they are probably tiny subcutaneous lipoma was in the area of her sacroiliac joints  Diagnoses and all orders for this visit:    Need for immunization against influenza  -     influenza vaccine, [id number], quadrivalent, 0 5 mL, preservative-free, for adult and pediatric patients 6 mos+ (AFLURIA, FLUARIX, FLULAVAL, FLUZONE)    Pharyngitis, unspecified etiology  -     amoxicillin-clavulanate (AUGMENTIN) 875-125 mg per tablet; Take 1 tablet by mouth every 12 (twelve) hours for 7 days  -     POCT rapid strepA  -     Throat culture          Subjective:   Chief Complaint   Patient presents with    Sore Throat     x6d    Fever     x6d        Patient ID: Martha Varner is a 34 y o  female  Patietn with sore throat for six days  She started with fever yesterday  Denies cough, but does have green mucous when she clears her throat  Taking Tylenol - it helps fever but not throat pain        The following portions of the patient's history were reviewed and updated as appropriate: allergies, current medications, past family history, past medical history, past social history, past surgical history and problem list     Review of Systems   Constitutional: Positive for fever  Negative for chills  HENT: Positive for sore throat  Negative for ear pain, facial swelling and sinus pain  Respiratory: Negative for cough and shortness of breath  Gastrointestinal: Negative for diarrhea and nausea  Skin: Negative for rash  Objective:      /70 (BP Location: Left arm, Patient Position: Sitting, Cuff Size: Standard)   Pulse 86   Temp 98 4 °F (36 9 °C) (Tympanic)   Resp 16   Ht 5' 6" (1 676 m)   Wt 57 4 kg (126 lb 9 6 oz)   LMP 11/01/2019   SpO2 99%   BMI 20 43 kg/m²          Physical Exam   Constitutional: She is oriented to person, place, and time  No distress  HENT:   Right Ear: Tympanic membrane normal    Left Ear: Tympanic membrane normal    Mouth/Throat: Posterior oropharyngeal erythema present  No oropharyngeal exudate  Lymphadenopathy:     She has cervical adenopathy (slight anterior)  Neurological: She is alert and oriented to person, place, and time  Skin: No rash noted  Tiny freely movable nodules low back  Psychiatric: She has a normal mood and affect  Her behavior is normal    Nursing note and vitals reviewed

## 2019-11-15 ENCOUNTER — SOCIAL WORK (OUTPATIENT)
Dept: BEHAVIORAL/MENTAL HEALTH CLINIC | Facility: CLINIC | Age: 29
End: 2019-11-15
Payer: COMMERCIAL

## 2019-11-15 DIAGNOSIS — F41.1 GENERALIZED ANXIETY DISORDER: ICD-10-CM

## 2019-11-15 DIAGNOSIS — F43.12 POST-TRAUMATIC STRESS DISORDER, CHRONIC: Primary | ICD-10-CM

## 2019-11-15 PROCEDURE — 90834 PSYTX W PT 45 MINUTES: CPT | Performed by: PSYCHIATRY & NEUROLOGY

## 2019-11-15 NOTE — PSYCH
Assessment/Plan:      Diagnoses and all orders for this visit:    Post-traumatic stress disorder, chronic    Generalized anxiety disorder      Session time [id number] (total time 48 minutes)    Subjective:     Patient ID: Jake Benz is a 34 y o  female  HPI Met with Rebecca Pino for follow up  She shared that the last couple of weeks have been very busy, along with being sick on top of that, and she is very tired  She said that she has really been too busy to think about her mother and those issues, but has been thinking about her brother and their lack of a relationship  He texted her almost three weeks ago to tell her that he had "big news," and asked to talk to her face to face, and has yet to call her  She said that she is very upset and frustrated by this, because she had gotten her hopes up that after her visit to [place] that they would talk more  However it seems now that he will not uphold that promise to her  Discussed her frustration and ways to reframe thoughts about her expectations for their relationship  She also talked about dreams, with major theme of always letting people down and being a disappointment  She is still struggling with thinking that her  is angry with her, but did note that this is improving with medication and talking in therapy  She will try to incorporate some rest time in her days when she can, and will return in two weeks for follow up  Review of Systems      Objective:     Physical Exam    Psychiatric: calm and cooperative with good eye contact; mood anxious, affect congruent with mood; thought process logical and organized; concentration intact; speech and behavior normal; good insight and judgment; denies SI HI and psychosis

## 2019-12-26 ENCOUNTER — OFFICE VISIT (OUTPATIENT)
Dept: FAMILY MEDICINE CLINIC | Facility: CLINIC | Age: 29
End: 2019-12-26
Payer: COMMERCIAL

## 2019-12-26 VITALS
HEART RATE: 88 BPM | TEMPERATURE: 98.2 F | DIASTOLIC BLOOD PRESSURE: 68 MMHG | OXYGEN SATURATION: 98 % | BODY MASS INDEX: 19.41 KG/M2 | RESPIRATION RATE: 17 BRPM | SYSTOLIC BLOOD PRESSURE: 102 MMHG | WEIGHT: 120.8 LBS | HEIGHT: 66 IN

## 2019-12-26 DIAGNOSIS — J01.10 ACUTE FRONTAL SINUSITIS, RECURRENCE NOT SPECIFIED: Primary | ICD-10-CM

## 2019-12-26 PROCEDURE — 3008F BODY MASS INDEX DOCD: CPT | Performed by: NURSE PRACTITIONER

## 2019-12-26 PROCEDURE — 99213 OFFICE O/P EST LOW 20 MIN: CPT | Performed by: NURSE PRACTITIONER

## 2019-12-26 RX ORDER — AMOXICILLIN AND CLAVULANATE POTASSIUM 875; 125 MG/1; MG/1
1 TABLET, FILM COATED ORAL EVERY 12 HOURS SCHEDULED
Qty: 20 TABLET | Refills: 0 | Status: SHIPPED | OUTPATIENT
Start: 2019-12-26 | End: 2020-01-05

## 2019-12-26 RX ORDER — FLUTICASONE PROPIONATE 50 MCG
1 SPRAY, SUSPENSION (ML) NASAL DAILY
Qty: 1 BOTTLE | Refills: 2 | Status: SHIPPED | OUTPATIENT
Start: 2019-12-26 | End: 2021-07-03 | Stop reason: ALTCHOICE

## 2019-12-26 NOTE — PROGRESS NOTES
[facility]    ASSESSMENT AND PLAN     1  Acute frontal sinusitis, recurrence not specified  S/S consistent of sinusitis  Assessment as below  Rx today for Augmentin and Flonase  Dose and use reviewed  Rest, hydrate, may continue OTC cold medicine as needed  Probiotic while on antibiotic  Re-evaluate if symptoms persist or worsen    - amoxicillin-clavulanate (AUGMENTIN) 875-125 mg per tablet; Take 1 tablet by mouth every 12 (twelve) hours for 10 days  Dispense: 20 tablet; Refill: 0  - fluticasone (FLONASE) 50 mcg/act nasal spray; 1 spray into each nostril daily  Dispense: 1 Bottle; Refill: 2            SUBJECTIVE       Patient ID: Jake Benz is a 34 y o  female  Chief Complaint   Patient presents with    Cough     x3wks    Nasal Congestion     x3wks    Shortness of Breath     x3wks       HISTORY OF PRESENT ILLNESS    Presents today with sinus/respiratory complaint  Stated her symptoms started roughly 3 weeks ago with a head cold and sinus pressure  States she started to feel little bit better but then started with a dry cough  Cough lasted roughly 1 week  Had some postnasal drip, but was not coughing any mucus  Sinus symptoms have returned again  Now with some chest tightness, postnasal drip and green to yellow secretions  Denies any fevers  Denies any GI distress  She has taken sinex with minimal relief        The following portions of the patient's history were reviewed and updated as appropriate: allergies, current medications, past family history, past medical history, past social history, past surgical history and problem list     REVIEW OF SYSTEMS  Review of Systems   Constitutional: Positive for fatigue  Negative for fever  HENT: Positive for congestion, postnasal drip, sinus pressure, sinus pain and sore throat  Ear pain: Fullness  Respiratory: Positive for cough  Negative for chest tightness, shortness of breath and wheezing  Cardiovascular: Negative  Gastrointestinal: Negative  Psychiatric/Behavioral: Negative  OBJECTIVE      VITAL SIGNS  /68 (BP Location: Left arm, Patient Position: Sitting, Cuff Size: Standard)   Pulse 88   Temp 98 2 °F (36 8 °C) (Tympanic)   Resp 17   Ht 5' 5 75" (1 67 m)   Wt 54 8 kg (120 lb 12 8 oz)   LMP 12/13/2019   SpO2 98%   BMI 19 65 kg/m²     CURRENT MEDICATIONS    Current Outpatient Medications:     citalopram (CeleXA) 20 mg tablet, Take 1 5 tablets (30 mg total) by mouth daily, Disp: 45 tablet, Rfl: 5    Multiple Vitamin (MULTIVITAMIN) tablet, Take 1 tablet by mouth daily  , Disp: , Rfl:     amoxicillin-clavulanate (AUGMENTIN) 875-125 mg per tablet, Take 1 tablet by mouth every 12 (twelve) hours for 10 days, Disp: 20 tablet, Rfl: 0    benzonatate (TESSALON PERLES) 100 mg capsule, Take 1 capsule (100 mg total) by mouth 3 (three) times a day as needed for cough (Patient not taking: Reported on 11/6/2019), Disp: 20 capsule, Rfl: 0    fluticasone (FLONASE) 50 mcg/act nasal spray, 1 spray into each nostril daily, Disp: 1 Bottle, Rfl: 2      PHYSICAL EXAMINATION   Physical Exam   Constitutional: She is oriented to person, place, and time  Vital signs are normal  She appears well-developed and well-nourished  HENT:   Right Ear: Tympanic membrane and ear canal normal    Left Ear: Tympanic membrane and ear canal normal    Nose: Mucosal edema present  Right sinus exhibits maxillary sinus tenderness and frontal sinus tenderness  Left sinus exhibits maxillary sinus tenderness and frontal sinus tenderness  Mouth/Throat: Posterior oropharyngeal erythema present  Cardiovascular: Normal rate and regular rhythm  Pulmonary/Chest: Effort normal and breath sounds normal  No respiratory distress  She has no wheezes  She has no rhonchi  Positive dry cough during inspiration of exam   Lymphadenopathy:        Head (right side): No submandibular and no tonsillar adenopathy present  Head (left side):  No submandibular and no tonsillar adenopathy present  She has no cervical adenopathy  Neurological: She is alert and oriented to person, place, and time  Psychiatric: She has a normal mood and affect  Thought content normal    Nursing note and vitals reviewed

## 2020-03-02 DIAGNOSIS — R94.6 ABNORMAL THYROID FUNCTION TEST: ICD-10-CM

## 2020-03-02 DIAGNOSIS — G25.9 FUNCTIONAL MOVEMENT DISORDER: ICD-10-CM

## 2020-03-02 DIAGNOSIS — F41.8 DEPRESSION WITH ANXIETY: ICD-10-CM

## 2020-03-03 RX ORDER — CITALOPRAM 20 MG/1
30 TABLET ORAL DAILY
Qty: 45 TABLET | Refills: 0 | Status: SHIPPED | OUTPATIENT
Start: 2020-03-03 | End: 2020-04-05 | Stop reason: SDUPTHER

## 2020-03-12 ENCOUNTER — OFFICE VISIT (OUTPATIENT)
Dept: FAMILY MEDICINE CLINIC | Facility: CLINIC | Age: 30
End: 2020-03-12
Payer: COMMERCIAL

## 2020-03-12 VITALS
RESPIRATION RATE: 16 BRPM | BODY MASS INDEX: 19.13 KG/M2 | OXYGEN SATURATION: 98 % | TEMPERATURE: 98 F | HEIGHT: 66 IN | SYSTOLIC BLOOD PRESSURE: 100 MMHG | DIASTOLIC BLOOD PRESSURE: 70 MMHG | WEIGHT: 119 LBS | HEART RATE: 80 BPM

## 2020-03-12 DIAGNOSIS — F41.1 GENERALIZED ANXIETY DISORDER: ICD-10-CM

## 2020-03-12 DIAGNOSIS — G25.9 FUNCTIONAL MOVEMENT DISORDER: ICD-10-CM

## 2020-03-12 DIAGNOSIS — F43.12 POST-TRAUMATIC STRESS DISORDER, CHRONIC: Primary | ICD-10-CM

## 2020-03-12 PROCEDURE — 1036F TOBACCO NON-USER: CPT | Performed by: FAMILY MEDICINE

## 2020-03-12 PROCEDURE — 99214 OFFICE O/P EST MOD 30 MIN: CPT | Performed by: FAMILY MEDICINE

## 2020-03-12 RX ORDER — ARIPIPRAZOLE 5 MG/1
5 TABLET ORAL
Qty: 30 TABLET | Refills: 1 | Status: SHIPPED | OUTPATIENT
Start: 2020-03-12 | End: 2020-05-03 | Stop reason: SDUPTHER

## 2020-03-12 NOTE — PROGRESS NOTES
Assessment/Plan:   1  Post-traumatic stress disorder, chronic/Generalized anxiety disorder/Functional movement disorder  Reviewed patient's symptoms today  At this time, is unclear as to the exact cause of her worsening anxiety  She has been following up with her therapist regularly  Will continue with his current treatment  At this time, secondary to the significant worsening of her symptoms, will continue with her current treatment however will add Abilify 5 mg daily  Follow up with patient in 1 week to reassess her symptom control  If any of her symptoms should worsen severely, she must go directly to the ED   - ARIPiprazole (ABILIFY) 5 mg tablet; Take 1 tablet (5 mg total) by mouth daily at bedtime  Dispense: 30 tablet; Refill: 1             There are no diagnoses linked to this encounter  Subjective:       Chief Complaint   Patient presents with    Anxiety    Depression      Patient ID: Odalys Rivas is a 34 y o  female  Patient is a 70-year-old female presents today for a follow-up on her depression with anxiety  Since her last visit, over the past month she has been noticing significant worsening in her anxiety  She states that she has been having increased frequency of in her involuntary motor movements  This has been causing significant depression for her  She has been having frequent panic attacks  She has been taking her medications regularly  She believes that this was effective initially however she still has been having persistent problems  Review of Systems   Constitutional: Negative for activity change, chills, fatigue and fever  HENT: Negative for congestion, ear pain, sinus pressure and sore throat  Eyes: Negative for redness, itching and visual disturbance  Respiratory: Negative for cough and shortness of breath  Cardiovascular: Negative for chest pain and palpitations  Gastrointestinal: Negative for abdominal pain, diarrhea and nausea     Endocrine: Negative for cold intolerance and heat intolerance  Genitourinary: Negative for dysuria, flank pain and frequency  Musculoskeletal: Negative for arthralgias, back pain, gait problem and myalgias  Skin: Negative for color change  Allergic/Immunologic: Negative for environmental allergies  Neurological: Positive for tremors  Negative for dizziness, numbness and headaches  Psychiatric/Behavioral: Positive for behavioral problems, sleep disturbance and suicidal ideas  The patient is nervous/anxious  The following portions of the patient's history were reviewed and updated as appropriate : past family history, past medical history, past social history and past surgical history  Current Outpatient Medications:     citalopram (CeleXA) 20 mg tablet, Take 1 5 tablets (30 mg total) by mouth daily, Disp: 45 tablet, Rfl: 0    Multiple Vitamin (MULTIVITAMIN) tablet, Take 1 tablet by mouth daily  , Disp: , Rfl:     fluticasone (FLONASE) 50 mcg/act nasal spray, 1 spray into each nostril daily (Patient not taking: Reported on 3/12/2020), Disp: 1 Bottle, Rfl: 2         Objective:         Vitals:    03/12/20 1109   BP: 100/70   BP Location: Left arm   Patient Position: Sitting   Cuff Size: Adult   Pulse: 80   Resp: 16   Temp: 98 °F (36 7 °C)   TempSrc: Tympanic   SpO2: 98%   Weight: 54 kg (119 lb)   Height: 5' 5 75" (1 67 m)     Physical Exam   Constitutional: She is oriented to person, place, and time  She appears well-developed and well-nourished  HENT:   Head: Normocephalic and atraumatic  Nose: Nose normal    Mouth/Throat: No oropharyngeal exudate  Eyes: Pupils are equal, round, and reactive to light  Right eye exhibits no discharge  Left eye exhibits no discharge  Neck: Normal range of motion  Neck supple  No tracheal deviation present  Cardiovascular: Normal rate, regular rhythm and intact distal pulses  Exam reveals no gallop and no friction rub  No murmur heard    Pulses:       Dorsalis pedis pulses are 2+ on the right side, and 2+ on the left side  Posterior tibial pulses are 2+ on the right side, and 2+ on the left side  Pulmonary/Chest: Effort normal and breath sounds normal  No respiratory distress  She has no wheezes  She has no rales  Abdominal: Soft  Bowel sounds are normal  She exhibits no distension  There is no tenderness  There is no rebound and no guarding  Musculoskeletal: Normal range of motion  She exhibits no edema  Lymphadenopathy:        Head (right side): No submental and no submandibular adenopathy present  Head (left side): No submental and no submandibular adenopathy present  She has no cervical adenopathy  Right cervical: No superficial cervical, no deep cervical and no posterior cervical adenopathy present  Left cervical: No superficial cervical, no deep cervical and no posterior cervical adenopathy present  Neurological: She is alert and oriented to person, place, and time  She displays tremor  No cranial nerve deficit or sensory deficit  Skin: Skin is warm, dry and intact  Psychiatric: Judgment normal  Her mood appears anxious  Her speech is not rapid and/or pressured  She is not agitated  Cognition and memory are normal  She exhibits a depressed mood  Vitals reviewed

## 2020-03-19 ENCOUNTER — OFFICE VISIT (OUTPATIENT)
Dept: FAMILY MEDICINE CLINIC | Facility: CLINIC | Age: 30
End: 2020-03-19
Payer: COMMERCIAL

## 2020-03-19 VITALS
HEIGHT: 66 IN | SYSTOLIC BLOOD PRESSURE: 112 MMHG | HEART RATE: 99 BPM | BODY MASS INDEX: 19.22 KG/M2 | RESPIRATION RATE: 16 BRPM | DIASTOLIC BLOOD PRESSURE: 66 MMHG | TEMPERATURE: 98.6 F | OXYGEN SATURATION: 95 % | WEIGHT: 119.6 LBS

## 2020-03-19 DIAGNOSIS — F43.12 POST-TRAUMATIC STRESS DISORDER, CHRONIC: ICD-10-CM

## 2020-03-19 DIAGNOSIS — F41.1 GENERALIZED ANXIETY DISORDER: Primary | ICD-10-CM

## 2020-03-19 PROCEDURE — 99213 OFFICE O/P EST LOW 20 MIN: CPT | Performed by: FAMILY MEDICINE

## 2020-03-19 PROCEDURE — 3008F BODY MASS INDEX DOCD: CPT | Performed by: FAMILY MEDICINE

## 2020-03-19 PROCEDURE — 1036F TOBACCO NON-USER: CPT | Performed by: FAMILY MEDICINE

## 2020-03-19 NOTE — PROGRESS NOTES
Assessment/Plan:   1  Generalized anxiety disorder/Post-traumatic stress disorder, chronic  Reviewed patient's symptoms today  At this time, she has had significant improvement with her current treatment of the L5  Will continue with her Celexa as well  She denies SI or HI today  She was encouraged to continue with a strict diet and exercise plan  Follow up with patient in 3 months  Diagnoses and all orders for this visit:    Generalized anxiety disorder    Post-traumatic stress disorder, chronic          Subjective:       Chief Complaint   Patient presents with    Follow-up     1 week check      Patient ID: Unique Batista is a 34 y o  female  Patient is a 31-year-old female presents today for follow-up on her PTSD/anxiety disorder  Since her last visit, she states that she has been taking her Abilify regularly  She denies any worsening anxiety or periodic movement disorder  She states the medication has been largely effective for her  She denies SI or HI today  Review of Systems   Constitutional: Negative for activity change, chills, fatigue and fever  HENT: Negative for congestion, ear pain, sinus pressure and sore throat  Eyes: Negative for redness, itching and visual disturbance  Respiratory: Negative for cough and shortness of breath  Cardiovascular: Negative for chest pain and palpitations  Gastrointestinal: Negative for abdominal pain, diarrhea and nausea  Endocrine: Negative for cold intolerance and heat intolerance  Genitourinary: Negative for dysuria, flank pain and frequency  Musculoskeletal: Negative for arthralgias, back pain, gait problem and myalgias  Skin: Negative for color change  Allergic/Immunologic: Negative for environmental allergies  Neurological: Negative for dizziness, numbness and headaches  Psychiatric/Behavioral: Negative for behavioral problems and sleep disturbance         The following portions of the patient's history were reviewed and updated as appropriate : past family history, past medical history, past social history and past surgical history  Current Outpatient Medications:     ARIPiprazole (ABILIFY) 5 mg tablet, Take 1 tablet (5 mg total) by mouth daily at bedtime, Disp: 30 tablet, Rfl: 1    citalopram (CeleXA) 20 mg tablet, Take 1 5 tablets (30 mg total) by mouth daily, Disp: 45 tablet, Rfl: 0    Multiple Vitamin (MULTIVITAMIN) tablet, Take 1 tablet by mouth daily  , Disp: , Rfl:     fluticasone (FLONASE) 50 mcg/act nasal spray, 1 spray into each nostril daily (Patient not taking: Reported on 3/12/2020), Disp: 1 Bottle, Rfl: 2         Objective:         Vitals:    03/19/20 1140   BP: 112/66   BP Location: Left arm   Patient Position: Sitting   Pulse: 99   Resp: 16   Temp: 98 6 °F (37 °C)   TempSrc: Tympanic   SpO2: 95%   Weight: 54 3 kg (119 lb 9 6 oz)   Height: 5' 6" (1 676 m)     Physical Exam   Constitutional: Vital signs are normal  She appears well-developed and well-nourished  She is cooperative  She does not appear ill  No distress  HENT:   Head: Normocephalic and atraumatic  Right Ear: Hearing and external ear normal    Left Ear: Hearing and external ear normal    Nose: Nose normal  No nasal deformity or septal deviation  Mouth/Throat: Oropharynx is clear and moist and mucous membranes are normal    Eyes: Pupils are equal, round, and reactive to light  EOM and lids are normal    Neck: Trachea normal and normal range of motion  Neck supple  No edema and no erythema present  No thyromegaly present  Cardiovascular: Normal rate  Pulmonary/Chest: Effort normal  No respiratory distress  Abdominal: Normal appearance  There is no guarding  Musculoskeletal: Normal range of motion  Right shoulder: She exhibits no pain  Neurological: She is alert  She has normal strength  No cranial nerve deficit or sensory deficit  Skin: Skin is warm and dry  Psychiatric: She has a normal mood and affect   Her speech is normal and behavior is normal  Judgment and thought content normal  Cognition and memory are normal    Vitals reviewed

## 2020-04-05 DIAGNOSIS — R94.6 ABNORMAL THYROID FUNCTION TEST: ICD-10-CM

## 2020-04-05 DIAGNOSIS — G25.9 FUNCTIONAL MOVEMENT DISORDER: ICD-10-CM

## 2020-04-05 DIAGNOSIS — F41.8 DEPRESSION WITH ANXIETY: ICD-10-CM

## 2020-04-06 RX ORDER — CITALOPRAM 20 MG/1
30 TABLET ORAL DAILY
Qty: 45 TABLET | Refills: 3 | Status: SHIPPED | OUTPATIENT
Start: 2020-04-06 | End: 2020-07-25

## 2020-05-03 DIAGNOSIS — F43.12 POST-TRAUMATIC STRESS DISORDER, CHRONIC: ICD-10-CM

## 2020-05-03 DIAGNOSIS — F41.1 GENERALIZED ANXIETY DISORDER: ICD-10-CM

## 2020-05-03 DIAGNOSIS — G25.9 FUNCTIONAL MOVEMENT DISORDER: ICD-10-CM

## 2020-05-04 RX ORDER — ARIPIPRAZOLE 5 MG/1
5 TABLET ORAL
Qty: 30 TABLET | Refills: 1 | Status: SHIPPED | OUTPATIENT
Start: 2020-05-04 | End: 2020-07-06 | Stop reason: SDUPTHER

## 2020-07-06 DIAGNOSIS — F41.1 GENERALIZED ANXIETY DISORDER: ICD-10-CM

## 2020-07-06 DIAGNOSIS — G25.9 FUNCTIONAL MOVEMENT DISORDER: ICD-10-CM

## 2020-07-06 DIAGNOSIS — F43.12 POST-TRAUMATIC STRESS DISORDER, CHRONIC: ICD-10-CM

## 2020-07-06 RX ORDER — ARIPIPRAZOLE 5 MG/1
5 TABLET ORAL
Qty: 30 TABLET | Refills: 0 | Status: SHIPPED | OUTPATIENT
Start: 2020-07-06 | End: 2020-08-10 | Stop reason: SDUPTHER

## 2020-07-24 DIAGNOSIS — G25.9 FUNCTIONAL MOVEMENT DISORDER: ICD-10-CM

## 2020-07-24 DIAGNOSIS — R94.6 ABNORMAL THYROID FUNCTION TEST: ICD-10-CM

## 2020-07-24 DIAGNOSIS — F41.8 DEPRESSION WITH ANXIETY: ICD-10-CM

## 2020-07-25 RX ORDER — CITALOPRAM 20 MG/1
TABLET ORAL
Qty: 45 TABLET | Refills: 0 | Status: SHIPPED | OUTPATIENT
Start: 2020-07-25 | End: 2020-09-01 | Stop reason: SDUPTHER

## 2020-08-10 ENCOUNTER — OFFICE VISIT (OUTPATIENT)
Dept: FAMILY MEDICINE CLINIC | Facility: CLINIC | Age: 30
End: 2020-08-10
Payer: COMMERCIAL

## 2020-08-10 VITALS
RESPIRATION RATE: 18 BRPM | SYSTOLIC BLOOD PRESSURE: 100 MMHG | HEIGHT: 66 IN | WEIGHT: 137.4 LBS | BODY MASS INDEX: 22.08 KG/M2 | TEMPERATURE: 98 F | OXYGEN SATURATION: 96 % | DIASTOLIC BLOOD PRESSURE: 70 MMHG | HEART RATE: 91 BPM

## 2020-08-10 DIAGNOSIS — G25.9 FUNCTIONAL MOVEMENT DISORDER: ICD-10-CM

## 2020-08-10 DIAGNOSIS — F41.1 GENERALIZED ANXIETY DISORDER: ICD-10-CM

## 2020-08-10 DIAGNOSIS — F43.12 POST-TRAUMATIC STRESS DISORDER, CHRONIC: ICD-10-CM

## 2020-08-10 PROCEDURE — 3008F BODY MASS INDEX DOCD: CPT | Performed by: FAMILY MEDICINE

## 2020-08-10 PROCEDURE — 1036F TOBACCO NON-USER: CPT | Performed by: FAMILY MEDICINE

## 2020-08-10 PROCEDURE — 99213 OFFICE O/P EST LOW 20 MIN: CPT | Performed by: FAMILY MEDICINE

## 2020-08-10 RX ORDER — ARIPIPRAZOLE 5 MG/1
5 TABLET ORAL
Qty: 30 TABLET | Refills: 5 | Status: SHIPPED | OUTPATIENT
Start: 2020-08-10 | End: 2021-01-13 | Stop reason: SDUPTHER

## 2020-08-10 NOTE — PROGRESS NOTES
Assessment/Plan:   1  Post-traumatic stress disorder, chronic/Generalized anxiety disorder/Functional movement disorder  Patient appears clinically stable today  At this time, will continue with routine follow-up with her psychologist   Continue as well with regular exercise as well as a very well-balanced diet  Will continue with her citalopram as well as her Abilify  She states that her dosages have been stable for her  Will keep her dosages at this level  She was advised to follow-up if any symptoms are worsening  Follow up patient in 6 months  - ARIPiprazole (ABILIFY) 5 mg tablet; Take 1 tablet (5 mg total) by mouth daily at bedtime  Dispense: 30 tablet; Refill: 5               Diagnoses and all orders for this visit:    Post-traumatic stress disorder, chronic  -     ARIPiprazole (ABILIFY) 5 mg tablet; Take 1 tablet (5 mg total) by mouth daily at bedtime    Generalized anxiety disorder  -     ARIPiprazole (ABILIFY) 5 mg tablet; Take 1 tablet (5 mg total) by mouth daily at bedtime    Functional movement disorder  -     ARIPiprazole (ABILIFY) 5 mg tablet; Take 1 tablet (5 mg total) by mouth daily at bedtime          Subjective:       Chief Complaint   Patient presents with    Follow-up     med check       Patient ID: Darshan Casey is a 27 y o  female  Patient is a 61-year-old presents today for a follow-up on her chronic conditions  She has generalized anxiety disorder, PTSD, functional movement disorder  She states that she believes her conditions have been well controlled  She currently is in a more depressive phase  She does exercise regularly as well as follow-up with her psychologist   She states that the medications have been stable for her  She does take them regularly  Review of Systems   Constitutional: Negative for activity change, chills, fatigue and fever  HENT: Negative for congestion, ear pain, sinus pressure and sore throat      Eyes: Negative for redness, itching and visual disturbance  Respiratory: Negative for cough and shortness of breath  Cardiovascular: Negative for chest pain and palpitations  Gastrointestinal: Negative for abdominal pain, diarrhea and nausea  Endocrine: Negative for cold intolerance and heat intolerance  Genitourinary: Negative for dysuria, flank pain and frequency  Musculoskeletal: Negative for arthralgias, back pain, gait problem and myalgias  Skin: Negative for color change  Allergic/Immunologic: Negative for environmental allergies  Neurological: Negative for dizziness, numbness and headaches  Psychiatric/Behavioral: Negative for behavioral problems and sleep disturbance  The following portions of the patient's history were reviewed and updated as appropriate : past family history, past medical history, past social history and past surgical history  Current Outpatient Medications:     ARIPiprazole (ABILIFY) 5 mg tablet, Take 1 tablet (5 mg total) by mouth daily at bedtime, Disp: 30 tablet, Rfl: 0    citalopram (CeleXA) 20 mg tablet, TAKE ONE AND ONE-HALF TABLETS BY MOUTH DAILY , Disp: 45 tablet, Rfl: 0    Multiple Vitamin (MULTIVITAMIN) tablet, Take 1 tablet by mouth daily  , Disp: , Rfl:     fluticasone (FLONASE) 50 mcg/act nasal spray, 1 spray into each nostril daily (Patient not taking: Reported on 3/12/2020), Disp: 1 Bottle, Rfl: 2         Objective:         Vitals:    08/10/20 0856   BP: 100/70   BP Location: Left arm   Patient Position: Sitting   Cuff Size: Adult   Pulse: 91   Resp: 18   Temp: 98 °F (36 7 °C)   TempSrc: Tympanic   SpO2: 96%   Weight: 62 3 kg (137 lb 6 4 oz)   Height: 5' 6" (1 676 m)     Physical Exam  Vitals signs reviewed  Constitutional:       Appearance: She is well-developed  HENT:      Head: Normocephalic and atraumatic  Nose: Nose normal       Mouth/Throat:      Pharynx: No oropharyngeal exudate  Eyes:      General:         Right eye: No discharge  Left eye: No discharge  Pupils: Pupils are equal, round, and reactive to light  Neck:      Musculoskeletal: Normal range of motion and neck supple  Trachea: No tracheal deviation  Cardiovascular:      Rate and Rhythm: Normal rate and regular rhythm  Pulses:           Dorsalis pedis pulses are 2+ on the right side and 2+ on the left side  Posterior tibial pulses are 2+ on the right side and 2+ on the left side  Heart sounds: No murmur  No friction rub  No gallop  Pulmonary:      Effort: Pulmonary effort is normal  No respiratory distress  Breath sounds: Normal breath sounds  No wheezing or rales  Abdominal:      General: Bowel sounds are normal  There is no distension  Palpations: Abdomen is soft  Tenderness: There is no abdominal tenderness  There is no guarding or rebound  Musculoskeletal: Normal range of motion  Lymphadenopathy:      Head:      Right side of head: No submental or submandibular adenopathy  Left side of head: No submental or submandibular adenopathy  Cervical: No cervical adenopathy  Right cervical: No superficial, deep or posterior cervical adenopathy  Left cervical: No superficial, deep or posterior cervical adenopathy  Skin:     General: Skin is warm and dry  Neurological:      Mental Status: She is alert and oriented to person, place, and time  Cranial Nerves: No cranial nerve deficit  Sensory: No sensory deficit  Psychiatric:         Mood and Affect: Mood is not anxious or depressed           Speech: Speech normal          Behavior: Behavior normal          Judgment: Judgment normal

## 2020-09-01 DIAGNOSIS — F41.8 DEPRESSION WITH ANXIETY: ICD-10-CM

## 2020-09-01 DIAGNOSIS — R94.6 ABNORMAL THYROID FUNCTION TEST: ICD-10-CM

## 2020-09-01 DIAGNOSIS — G25.9 FUNCTIONAL MOVEMENT DISORDER: ICD-10-CM

## 2020-09-02 RX ORDER — CITALOPRAM 20 MG/1
30 TABLET ORAL DAILY
Qty: 45 TABLET | Refills: 0 | Status: SHIPPED | OUTPATIENT
Start: 2020-09-02 | End: 2020-10-02 | Stop reason: SDUPTHER

## 2020-10-02 DIAGNOSIS — G25.9 FUNCTIONAL MOVEMENT DISORDER: ICD-10-CM

## 2020-10-02 DIAGNOSIS — R94.6 ABNORMAL THYROID FUNCTION TEST: ICD-10-CM

## 2020-10-02 DIAGNOSIS — F41.8 DEPRESSION WITH ANXIETY: ICD-10-CM

## 2020-10-02 RX ORDER — CITALOPRAM 20 MG/1
30 TABLET ORAL DAILY
Qty: 45 TABLET | Refills: 3 | Status: SHIPPED | OUTPATIENT
Start: 2020-10-02 | End: 2021-01-22

## 2021-01-13 ENCOUNTER — OFFICE VISIT (OUTPATIENT)
Dept: FAMILY MEDICINE CLINIC | Facility: CLINIC | Age: 31
End: 2021-01-13
Payer: COMMERCIAL

## 2021-01-13 VITALS
OXYGEN SATURATION: 98 % | RESPIRATION RATE: 17 BRPM | DIASTOLIC BLOOD PRESSURE: 64 MMHG | HEART RATE: 89 BPM | HEIGHT: 66 IN | SYSTOLIC BLOOD PRESSURE: 112 MMHG | BODY MASS INDEX: 24.27 KG/M2 | WEIGHT: 151 LBS | TEMPERATURE: 98.7 F

## 2021-01-13 DIAGNOSIS — F43.12 POST-TRAUMATIC STRESS DISORDER, CHRONIC: ICD-10-CM

## 2021-01-13 DIAGNOSIS — G25.9 FUNCTIONAL MOVEMENT DISORDER: ICD-10-CM

## 2021-01-13 DIAGNOSIS — Z23 NEED FOR VACCINATION: Primary | ICD-10-CM

## 2021-01-13 DIAGNOSIS — F33.2 SEVERE EPISODE OF RECURRENT MAJOR DEPRESSIVE DISORDER, WITHOUT PSYCHOTIC FEATURES (HCC): ICD-10-CM

## 2021-01-13 DIAGNOSIS — F41.1 GENERALIZED ANXIETY DISORDER: ICD-10-CM

## 2021-01-13 PROCEDURE — 90682 RIV4 VACC RECOMBINANT DNA IM: CPT | Performed by: FAMILY MEDICINE

## 2021-01-13 PROCEDURE — 90471 IMMUNIZATION ADMIN: CPT | Performed by: FAMILY MEDICINE

## 2021-01-13 PROCEDURE — 99214 OFFICE O/P EST MOD 30 MIN: CPT | Performed by: FAMILY MEDICINE

## 2021-01-13 RX ORDER — ARIPIPRAZOLE 5 MG/1
7.5 TABLET ORAL
Qty: 45 TABLET | Refills: 0 | Status: SHIPPED | OUTPATIENT
Start: 2021-01-13 | End: 2021-02-10 | Stop reason: SDUPTHER

## 2021-01-13 NOTE — PROGRESS NOTES
Assessment/Plan:   1  Post-traumatic stress disorder, chronic/Generalized anxiety disorder/ major depressive disorder   reviewed patient's symptoms today  At this time it appears that she has persistent worsening of her depression  Is unclear if this is all due to seasonal change  She was advised on the different treatment options  Will continue with her current treatment of Celexa 30 mg daily  Will increase her Abilify to 7 5 mg  She was encouraged to continue to speak with her therapist regularly  Follow up patient in 3-4 weeks to reassess her symptom control   - ARIPiprazole (ABILIFY) 5 mg tablet; Take 1 5 tablets (7 5 mg total) by mouth daily at bedtime  Dispense: 45 tablet; Refill: 0    2  Need for vaccination  - influenza vaccine, quadrivalent, recombinant, PF, 0 5 mL, for patients 18 yr+ (FLUBLOK)       Diagnoses and all orders for this visit:    Need for vaccination  -     influenza vaccine, quadrivalent, recombinant, PF, 0 5 mL, for patients 18 yr+ (FLUBLOK)          Subjective:       Chief Complaint   Patient presents with    Follow-up    Anxiety      Patient ID: Unique Batista is a 27 y o  female  Patient is a 20-year-old female presents today for a follow-up on her chronic conditions  She has PTSD, generalized anxiety disorder as well as major depressive disorder  She has been taking medications regularly  She denies adverse reactions with medications she states that she believes that since November she has been noticing persistent worsening of her depression  She states that she usually develops these symptoms with the seasonal change  She denies any SI or HI  She states that she has been having low energy, concentration as well as cell seclusion  She has been seeing her therapist regular      Review of Systems   Constitutional: Negative for activity change, chills, fatigue and fever  HENT: Negative for congestion, ear pain, sinus pressure and sore throat      Eyes: Negative for redness, itching and visual disturbance  Respiratory: Negative for cough and shortness of breath  Cardiovascular: Negative for chest pain and palpitations  Gastrointestinal: Negative for abdominal pain, diarrhea and nausea  Endocrine: Negative for cold intolerance and heat intolerance  Genitourinary: Negative for dysuria, flank pain and frequency  Musculoskeletal: Negative for arthralgias, back pain, gait problem and myalgias  Skin: Negative for color change  Allergic/Immunologic: Negative for environmental allergies  Neurological: Negative for dizziness, numbness and headaches  Psychiatric/Behavioral: Negative for behavioral problems and sleep disturbance  The following portions of the patient's history were reviewed and updated as appropriate : past family history, past medical history, past social history and past surgical history  Current Outpatient Medications:     ARIPiprazole (ABILIFY) 5 mg tablet, Take 1 tablet (5 mg total) by mouth daily at bedtime, Disp: 30 tablet, Rfl: 5    citalopram (CeleXA) 20 mg tablet, Take 1 5 tablets (30 mg total) by mouth daily, Disp: 45 tablet, Rfl: 3    Multiple Vitamin (MULTIVITAMIN) tablet, Take 1 tablet by mouth daily  , Disp: , Rfl:     fluticasone (FLONASE) 50 mcg/act nasal spray, 1 spray into each nostril daily (Patient not taking: Reported on 3/12/2020), Disp: 1 Bottle, Rfl: 2         Objective:         Vitals:    01/13/21 0844   BP: 112/64   BP Location: Left arm   Patient Position: Sitting   Pulse: 89   Resp: 17   Temp: 98 7 °F (37 1 °C)   TempSrc: Tympanic   SpO2: 98%   Weight: 68 5 kg (151 lb)   Height: 5' 6" (1 676 m)     Physical Exam  Vitals signs reviewed  Constitutional:       Appearance: She is well-developed  HENT:      Head: Normocephalic and atraumatic  Nose: Nose normal       Mouth/Throat:      Pharynx: No oropharyngeal exudate  Eyes:      General: No scleral icterus  Right eye: No discharge           Left eye: No discharge  Pupils: Pupils are equal, round, and reactive to light  Neck:      Musculoskeletal: Normal range of motion and neck supple  Trachea: No tracheal deviation  Cardiovascular:      Rate and Rhythm: Normal rate and regular rhythm  Pulses:           Dorsalis pedis pulses are 2+ on the right side and 2+ on the left side  Posterior tibial pulses are 2+ on the right side and 2+ on the left side  Heart sounds: Normal heart sounds  No murmur  No friction rub  No gallop  Pulmonary:      Effort: Pulmonary effort is normal  No respiratory distress  Breath sounds: Normal breath sounds  No wheezing or rales  Abdominal:      General: Bowel sounds are normal  There is no distension  Palpations: Abdomen is soft  Tenderness: There is no abdominal tenderness  There is no guarding or rebound  Musculoskeletal: Normal range of motion  Lymphadenopathy:      Head:      Right side of head: No submental or submandibular adenopathy  Left side of head: No submental or submandibular adenopathy  Cervical: No cervical adenopathy  Right cervical: No superficial, deep or posterior cervical adenopathy  Left cervical: No superficial, deep or posterior cervical adenopathy  Skin:     General: Skin is warm and dry  Findings: No erythema  Neurological:      Mental Status: She is alert and oriented to person, place, and time  Cranial Nerves: No cranial nerve deficit  Sensory: No sensory deficit  Psychiatric:         Mood and Affect: Mood is not anxious or depressed  Speech: Speech normal          Behavior: Behavior normal          Thought Content:  Thought content normal          Judgment: Judgment normal

## 2021-01-22 DIAGNOSIS — F41.8 DEPRESSION WITH ANXIETY: ICD-10-CM

## 2021-01-22 DIAGNOSIS — G25.9 FUNCTIONAL MOVEMENT DISORDER: ICD-10-CM

## 2021-01-22 DIAGNOSIS — R94.6 ABNORMAL THYROID FUNCTION TEST: ICD-10-CM

## 2021-01-22 RX ORDER — CITALOPRAM 20 MG/1
TABLET ORAL
Qty: 45 TABLET | Refills: 5 | Status: SHIPPED | OUTPATIENT
Start: 2021-01-22 | End: 2021-07-10

## 2021-02-10 ENCOUNTER — OFFICE VISIT (OUTPATIENT)
Dept: FAMILY MEDICINE CLINIC | Facility: CLINIC | Age: 31
End: 2021-02-10
Payer: COMMERCIAL

## 2021-02-10 VITALS
SYSTOLIC BLOOD PRESSURE: 112 MMHG | BODY MASS INDEX: 25.01 KG/M2 | OXYGEN SATURATION: 97 % | TEMPERATURE: 98.3 F | RESPIRATION RATE: 16 BRPM | WEIGHT: 155.6 LBS | DIASTOLIC BLOOD PRESSURE: 70 MMHG | HEIGHT: 66 IN | HEART RATE: 86 BPM

## 2021-02-10 DIAGNOSIS — G25.9 FUNCTIONAL MOVEMENT DISORDER: ICD-10-CM

## 2021-02-10 DIAGNOSIS — F43.12 POST-TRAUMATIC STRESS DISORDER, CHRONIC: ICD-10-CM

## 2021-02-10 DIAGNOSIS — F41.1 GENERALIZED ANXIETY DISORDER: ICD-10-CM

## 2021-02-10 PROCEDURE — 99213 OFFICE O/P EST LOW 20 MIN: CPT | Performed by: FAMILY MEDICINE

## 2021-02-10 RX ORDER — ARIPIPRAZOLE 5 MG/1
7.5 TABLET ORAL
Qty: 45 TABLET | Refills: 2 | Status: SHIPPED | OUTPATIENT
Start: 2021-02-10 | End: 2021-07-03 | Stop reason: ALTCHOICE

## 2021-02-10 NOTE — PROGRESS NOTES
Assessment/Plan:   1  Post-traumatic stress disorder, chronic  Reviewed patient's symptoms today  At this time, symptoms appear slowly improving  At this time, will remain on her current dose of Abilify 7 5 mg daily as well as her Celexa 30 mg daily  Will follow up with patient in 3 months to reassess her symptom control  Continue with regular follow-up with her therapist   - ARIPiprazole (ABILIFY) 5 mg tablet; Take 1 5 tablets (7 5 mg total) by mouth daily at bedtime  Dispense: 45 tablet; Refill: 2               Diagnoses and all orders for this visit:    Post-traumatic stress disorder, chronic  -     ARIPiprazole (ABILIFY) 5 mg tablet; Take 1 5 tablets (7 5 mg total) by mouth daily at bedtime    Generalized anxiety disorder  -     ARIPiprazole (ABILIFY) 5 mg tablet; Take 1 5 tablets (7 5 mg total) by mouth daily at bedtime    Functional movement disorder  -     ARIPiprazole (ABILIFY) 5 mg tablet; Take 1 5 tablets (7 5 mg total) by mouth daily at bedtime          Subjective:       Chief Complaint   Patient presents with    Follow-up     med check       Patient ID: aMrtha Varner is a 27 y o  female  HPI    Review of Systems   Constitutional: Negative for activity change, chills, fatigue and fever  HENT: Negative for congestion, ear pain, sinus pressure and sore throat  Eyes: Negative for redness, itching and visual disturbance  Respiratory: Negative for cough and shortness of breath  Cardiovascular: Negative for chest pain and palpitations  Gastrointestinal: Negative for abdominal pain, diarrhea and nausea  Endocrine: Negative for cold intolerance and heat intolerance  Genitourinary: Negative for dysuria, flank pain and frequency  Musculoskeletal: Negative for arthralgias, back pain, gait problem and myalgias  Skin: Negative for color change  Allergic/Immunologic: Negative for environmental allergies  Neurological: Negative for dizziness, numbness and headaches  Psychiatric/Behavioral: Negative for behavioral problems and sleep disturbance  The following portions of the patient's history were reviewed and updated as appropriate : past family history, past medical history, past social history and past surgical history  Current Outpatient Medications:     ARIPiprazole (ABILIFY) 5 mg tablet, Take 1 5 tablets (7 5 mg total) by mouth daily at bedtime, Disp: 45 tablet, Rfl: 0    citalopram (CeleXA) 20 mg tablet, take 1 and 1/2 tablets by mouth daily, Disp: 45 tablet, Rfl: 5    Multiple Vitamin (MULTIVITAMIN) tablet, Take 1 tablet by mouth daily  , Disp: , Rfl:     fluticasone (FLONASE) 50 mcg/act nasal spray, 1 spray into each nostril daily (Patient not taking: Reported on 2/10/2021), Disp: 1 Bottle, Rfl: 2         Objective:         Vitals:    02/10/21 0757   BP: 112/70   BP Location: Left arm   Patient Position: Sitting   Cuff Size: Adult   Pulse: 86   Resp: 16   Temp: 98 3 °F (36 8 °C)   TempSrc: Tympanic   SpO2: 97%   Weight: 70 6 kg (155 lb 9 6 oz)   Height: 5' 6" (1 676 m)     Physical Exam  Vitals signs reviewed  Constitutional:       Appearance: She is well-developed  HENT:      Head: Normocephalic and atraumatic  Nose: Nose normal       Mouth/Throat:      Pharynx: No oropharyngeal exudate  Eyes:      General: No scleral icterus  Right eye: No discharge  Left eye: No discharge  Pupils: Pupils are equal, round, and reactive to light  Neck:      Musculoskeletal: Normal range of motion and neck supple  Trachea: No tracheal deviation  Cardiovascular:      Rate and Rhythm: Normal rate and regular rhythm  Pulses:           Dorsalis pedis pulses are 2+ on the right side and 2+ on the left side  Posterior tibial pulses are 2+ on the right side and 2+ on the left side  Heart sounds: Normal heart sounds  No murmur  No friction rub  No gallop      Pulmonary:      Effort: Pulmonary effort is normal  No respiratory distress  Breath sounds: Normal breath sounds  No wheezing or rales  Abdominal:      General: Bowel sounds are normal  There is no distension  Palpations: Abdomen is soft  Tenderness: There is no abdominal tenderness  There is no guarding or rebound  Musculoskeletal: Normal range of motion  Lymphadenopathy:      Head:      Right side of head: No submental or submandibular adenopathy  Left side of head: No submental or submandibular adenopathy  Cervical: No cervical adenopathy  Right cervical: No superficial, deep or posterior cervical adenopathy  Left cervical: No superficial, deep or posterior cervical adenopathy  Skin:     General: Skin is warm and dry  Findings: No erythema  Neurological:      Mental Status: She is alert and oriented to person, place, and time  Cranial Nerves: No cranial nerve deficit  Sensory: No sensory deficit  Psychiatric:         Mood and Affect: Mood is not anxious or depressed  Speech: Speech normal          Behavior: Behavior normal          Thought Content:  Thought content normal          Judgment: Judgment normal

## 2021-05-14 ENCOUNTER — OFFICE VISIT (OUTPATIENT)
Dept: FAMILY MEDICINE CLINIC | Facility: CLINIC | Age: 31
End: 2021-05-14
Payer: COMMERCIAL

## 2021-05-14 VITALS
WEIGHT: 160.8 LBS | HEIGHT: 66 IN | DIASTOLIC BLOOD PRESSURE: 68 MMHG | TEMPERATURE: 98.1 F | OXYGEN SATURATION: 99 % | HEART RATE: 85 BPM | SYSTOLIC BLOOD PRESSURE: 110 MMHG | RESPIRATION RATE: 16 BRPM | BODY MASS INDEX: 25.84 KG/M2

## 2021-05-14 DIAGNOSIS — F43.12 POST-TRAUMATIC STRESS DISORDER, CHRONIC: ICD-10-CM

## 2021-05-14 DIAGNOSIS — F41.1 GENERALIZED ANXIETY DISORDER: ICD-10-CM

## 2021-05-14 DIAGNOSIS — G25.9 FUNCTIONAL MOVEMENT DISORDER: ICD-10-CM

## 2021-05-14 PROCEDURE — 99214 OFFICE O/P EST MOD 30 MIN: CPT | Performed by: FAMILY MEDICINE
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
## 2021-07-03 ENCOUNTER — APPOINTMENT (OUTPATIENT)
Dept: RADIOLOGY | Facility: MEDICAL CENTER | Age: 31
End: 2021-07-03
Payer: COMMERCIAL

## 2021-07-03 ENCOUNTER — OFFICE VISIT (OUTPATIENT)
Dept: FAMILY MEDICINE CLINIC | Facility: CLINIC | Age: 31
End: 2021-07-03
Payer: COMMERCIAL

## 2021-07-03 VITALS
OXYGEN SATURATION: 98 % | TEMPERATURE: 97.8 F | SYSTOLIC BLOOD PRESSURE: 102 MMHG | BODY MASS INDEX: 25.65 KG/M2 | HEART RATE: 81 BPM | RESPIRATION RATE: 16 BRPM | DIASTOLIC BLOOD PRESSURE: 70 MMHG | WEIGHT: 159.6 LBS | HEIGHT: 66 IN

## 2021-07-03 DIAGNOSIS — J40 BRONCHITIS: ICD-10-CM

## 2021-07-03 DIAGNOSIS — R05.9 COUGH: ICD-10-CM

## 2021-07-03 DIAGNOSIS — R05.9 COUGH: Primary | ICD-10-CM

## 2021-07-03 PROCEDURE — 71046 X-RAY EXAM CHEST 2 VIEWS: CPT

## 2021-07-03 PROCEDURE — 99213 OFFICE O/P EST LOW 20 MIN: CPT | Performed by: FAMILY MEDICINE

## 2021-07-03 RX ORDER — BENZONATATE 200 MG/1
200 CAPSULE ORAL 3 TIMES DAILY PRN
Qty: 20 CAPSULE | Refills: 0 | Status: SHIPPED | OUTPATIENT
Start: 2021-07-03 | End: 2021-08-25

## 2021-07-03 NOTE — PROGRESS NOTES
Assessment/Plan:    Patient is 70-year-old female with tightness on chest for 2 weeks  Her children had been ill with respiratory infections and then she developed head congestion which has subsided but she continues with cough and tightness in her chest   I will treat for bronchitis with an inhaler -combination of long-acting bronchodilator and steroid  I also prescribed benzonatate for cough if she needs it  She has not taken any over-the-counter medication because of being on an antidepressant and serotonin syndrome  I did order an x-ray to rule out pneumonia  We do not have x-ray available in our office building today but I did tell patient that she could go over to [facility] to have this done  Diagnoses and all orders for this visit:    Cough  -     XR chest pa & lateral; Future  -     benzonatate (TESSALON) 200 MG capsule; Take 1 capsule (200 mg total) by mouth 3 (three) times a day as needed for cough    Bronchitis  -     XR chest pa & lateral; Future  -     mometasone-formoterol (Dulera) 100-5 MCG/ACT inhaler; Inhale 2 puffs 2 (two) times a day Rinse mouth after use  Subjective:   Chief Complaint   Patient presents with    Cough     Dry Cough x2wk    Shortness of Breath     x2wk        Patient ID: Jerome Ramirez is a 32 y o  female  Patient is complaining of cough - dry for two weeks  Has increased fluids  No history of allergy problems  She is not smoker  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  Had congestion for about 8 days in head  Now gets winded going up and down stairs  Cough  This is a new problem  The current episode started 1 to 4 weeks ago  The problem has been gradually worsening  The cough is non-productive  Associated symptoms include shortness of breath  Pertinent negatives include no chest pain, chills, ear congestion, ear pain, fever, headaches, sore throat or wheezing  Nasal congestion:   Two weeks ago   She has tried rest ( fluids) for the symptoms  The treatment provided no relief  Shortness of Breath  Pertinent negatives include no abdominal pain, chest pain, ear pain, fever, headaches, sore throat or wheezing  she denies any travel, new animals at home, new carpeting  The following portions of the patient's history were reviewed and updated as appropriate: allergies, current medications, past family history, past medical history, past social history, past surgical history and problem list     Review of Systems   Constitutional: Negative for chills and fever  HENT: Negative for congestion, ear pain and sore throat  Respiratory: Positive for cough and shortness of breath  Negative for chest tightness and wheezing  Cardiovascular: Negative for chest pain and palpitations  Gastrointestinal: Negative for abdominal pain, constipation, diarrhea and nausea  Genitourinary: Negative for difficulty urinating  Skin: Negative  Neurological: Negative for dizziness and headaches  Psychiatric/Behavioral: Negative  Objective:      /70 (BP Location: Left arm, Patient Position: Sitting, Cuff Size: Standard)   Pulse 81   Temp 97 8 °F (36 6 °C) (Tympanic)   Resp 16   Ht 5' 5 6" (1 666 m)   Wt 72 4 kg (159 lb 9 6 oz)   LMP 06/06/2021   SpO2 98%   BMI 26 08 kg/m²          Physical Exam  Vitals and nursing note reviewed  Constitutional:       Appearance: She is not ill-appearing  HENT:      Head: Normocephalic  Mouth/Throat:      Mouth: Mucous membranes are moist       Pharynx: No oropharyngeal exudate  Cardiovascular:      Rate and Rhythm: Normal rate and regular rhythm  Pulmonary:      Effort: Pulmonary effort is normal       Breath sounds: Normal breath sounds  No wheezing, rhonchi or rales  Musculoskeletal:      Right lower leg: No edema  Left lower leg: No edema  Lymphadenopathy:      Cervical: No cervical adenopathy  Skin:     General: Skin is warm and dry     Neurological:      Mental Status: She is alert and oriented to person, place, and time     Psychiatric:         Mood and Affect: Mood normal

## 2021-08-25 ENCOUNTER — APPOINTMENT (OUTPATIENT)
Dept: RADIOLOGY | Facility: MEDICAL CENTER | Age: 31
End: 2021-08-25
Payer: COMMERCIAL

## 2021-08-25 ENCOUNTER — OFFICE VISIT (OUTPATIENT)
Dept: FAMILY MEDICINE CLINIC | Facility: CLINIC | Age: 31
End: 2021-08-25
Payer: COMMERCIAL

## 2021-08-25 VITALS
HEART RATE: 92 BPM | RESPIRATION RATE: 18 BRPM | HEIGHT: 66 IN | WEIGHT: 157 LBS | TEMPERATURE: 99.6 F | OXYGEN SATURATION: 97 % | DIASTOLIC BLOOD PRESSURE: 72 MMHG | BODY MASS INDEX: 25.23 KG/M2 | SYSTOLIC BLOOD PRESSURE: 110 MMHG

## 2021-08-25 DIAGNOSIS — S89.92XA INJURY OF LEFT KNEE, INITIAL ENCOUNTER: Primary | ICD-10-CM

## 2021-08-25 DIAGNOSIS — S89.92XA INJURY OF LEFT KNEE, INITIAL ENCOUNTER: ICD-10-CM

## 2021-08-25 PROCEDURE — 73564 X-RAY EXAM KNEE 4 OR MORE: CPT

## 2021-08-25 PROCEDURE — 99213 OFFICE O/P EST LOW 20 MIN: CPT | Performed by: FAMILY MEDICINE

## 2021-08-25 RX ORDER — IBUPROFEN 600 MG/1
TABLET ORAL EVERY 6 HOURS PRN
COMMUNITY
End: 2021-11-16

## 2021-08-25 RX ORDER — MELOXICAM 15 MG/1
15 TABLET ORAL DAILY
Qty: 30 TABLET | Refills: 0 | Status: SHIPPED | OUTPATIENT
Start: 2021-08-25 | End: 2021-11-16

## 2021-08-25 NOTE — ASSESSMENT & PLAN NOTE
Given trauma and exam will check x-rays and refer to orthopedics; start meloxicam; f/u guidance given
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
## 2021-08-25 NOTE — PATIENT INSTRUCTIONS
Take meloxicam daily  Complete x-rays  Follow up with orthopedics  Call if worsening pain or no relief

## 2021-08-30 ENCOUNTER — TELEPHONE (OUTPATIENT)
Dept: FAMILY MEDICINE CLINIC | Facility: CLINIC | Age: 31
End: 2021-08-30

## 2021-08-30 DIAGNOSIS — M25.569 ACUTE KNEE PAIN, UNSPECIFIED LATERALITY: Primary | ICD-10-CM

## 2021-08-30 RX ORDER — TRAMADOL HYDROCHLORIDE 50 MG/1
50 TABLET ORAL EVERY 6 HOURS PRN
Qty: 20 TABLET | Refills: 0 | Status: SHIPPED | OUTPATIENT
Start: 2021-08-30 | End: 2021-11-16

## 2021-08-30 NOTE — TELEPHONE ENCOUNTER
Pt called stating her knee pain has worsened, the meloxicam helps but not much   Pt asking for stronger medication until she sees ortho on 9/3/2021

## 2021-09-03 ENCOUNTER — APPOINTMENT (OUTPATIENT)
Dept: RADIOLOGY | Facility: MEDICAL CENTER | Age: 31
End: 2021-09-03
Payer: COMMERCIAL

## 2021-09-03 ENCOUNTER — CONSULT (OUTPATIENT)
Dept: OBGYN CLINIC | Facility: MEDICAL CENTER | Age: 31
End: 2021-09-03
Payer: COMMERCIAL

## 2021-09-03 VITALS
BODY MASS INDEX: 25.23 KG/M2 | DIASTOLIC BLOOD PRESSURE: 75 MMHG | HEART RATE: 103 BPM | SYSTOLIC BLOOD PRESSURE: 125 MMHG | WEIGHT: 157 LBS | HEIGHT: 66 IN

## 2021-09-03 DIAGNOSIS — S89.92XA INJURY OF LEFT KNEE, INITIAL ENCOUNTER: ICD-10-CM

## 2021-09-03 DIAGNOSIS — T14.8XXA CONTUSION OF BONE: Primary | ICD-10-CM

## 2021-09-03 PROCEDURE — 99243 OFF/OP CNSLTJ NEW/EST LOW 30: CPT | Performed by: ORTHOPAEDIC SURGERY

## 2021-09-03 PROCEDURE — 73560 X-RAY EXAM OF KNEE 1 OR 2: CPT

## 2021-09-03 RX ORDER — DICLOFENAC SODIUM 75 MG/1
75 TABLET, DELAYED RELEASE ORAL 2 TIMES DAILY PRN
Qty: 60 TABLET | Refills: 1 | Status: SHIPPED | OUTPATIENT
Start: 2021-09-03 | End: 2022-06-06
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
## 2021-10-04 ENCOUNTER — TELEMEDICINE (OUTPATIENT)
Dept: FAMILY MEDICINE CLINIC | Facility: CLINIC | Age: 31
End: 2021-10-04
Payer: COMMERCIAL

## 2021-10-04 VITALS — HEIGHT: 66 IN | WEIGHT: 157 LBS | TEMPERATURE: 97.3 F | BODY MASS INDEX: 25.23 KG/M2

## 2021-10-04 DIAGNOSIS — B34.9 VIRAL ILLNESS: Primary | ICD-10-CM

## 2021-10-04 PROCEDURE — 99213 OFFICE O/P EST LOW 20 MIN: CPT | Performed by: NURSE PRACTITIONER

## 2021-10-04 PROCEDURE — 0241U HB NFCT DS VIR RESP RNA 4 TRGT: CPT | Performed by: NURSE PRACTITIONER

## 2021-10-15 ENCOUNTER — APPOINTMENT (OUTPATIENT)
Dept: LAB | Facility: MEDICAL CENTER | Age: 31
End: 2021-10-15
Payer: COMMERCIAL

## 2021-10-15 ENCOUNTER — TELEMEDICINE (OUTPATIENT)
Dept: FAMILY MEDICINE CLINIC | Facility: CLINIC | Age: 31
End: 2021-10-15
Payer: COMMERCIAL

## 2021-10-15 DIAGNOSIS — F33.2 SEVERE EPISODE OF RECURRENT MAJOR DEPRESSIVE DISORDER, WITHOUT PSYCHOTIC FEATURES (HCC): ICD-10-CM

## 2021-10-15 DIAGNOSIS — R53.83 FATIGUE, UNSPECIFIED TYPE: Primary | ICD-10-CM

## 2021-10-15 DIAGNOSIS — R53.83 FATIGUE, UNSPECIFIED TYPE: ICD-10-CM

## 2021-10-15 LAB
ALBUMIN SERPL BCP-MCNC: 3.8 G/DL (ref 3.5–5)
ALP SERPL-CCNC: 77 U/L (ref 46–116)
ALT SERPL W P-5'-P-CCNC: 27 U/L (ref 12–78)
ANION GAP SERPL CALCULATED.3IONS-SCNC: 4 MMOL/L (ref 4–13)
AST SERPL W P-5'-P-CCNC: 18 U/L (ref 5–45)
BASOPHILS # BLD AUTO: 0.06 THOUSANDS/ΜL (ref 0–0.1)
BASOPHILS NFR BLD AUTO: 1 % (ref 0–1)
BILIRUB SERPL-MCNC: 0.25 MG/DL (ref 0.2–1)
BUN SERPL-MCNC: 14 MG/DL (ref 5–25)
CALCIUM SERPL-MCNC: 9.9 MG/DL (ref 8.3–10.1)
CHLORIDE SERPL-SCNC: 103 MMOL/L (ref 100–108)
CO2 SERPL-SCNC: 29 MMOL/L (ref 21–32)
CREAT SERPL-MCNC: 0.72 MG/DL (ref 0.6–1.3)
EOSINOPHIL # BLD AUTO: 0.3 THOUSAND/ΜL (ref 0–0.61)
EOSINOPHIL NFR BLD AUTO: 4 % (ref 0–6)
ERYTHROCYTE [DISTWIDTH] IN BLOOD BY AUTOMATED COUNT: 12.9 % (ref 11.6–15.1)
GFR SERPL CREATININE-BSD FRML MDRD: 112 ML/MIN/1.73SQ M
GLUCOSE P FAST SERPL-MCNC: 92 MG/DL (ref 65–99)
HCT VFR BLD AUTO: 45.8 % (ref 34.8–46.1)
HGB BLD-MCNC: 15.1 G/DL (ref 11.5–15.4)
IMM GRANULOCYTES # BLD AUTO: 0.02 THOUSAND/UL (ref 0–0.2)
IMM GRANULOCYTES NFR BLD AUTO: 0 % (ref 0–2)
LYMPHOCYTES # BLD AUTO: 2.04 THOUSANDS/ΜL (ref 0.6–4.47)
LYMPHOCYTES NFR BLD AUTO: 28 % (ref 14–44)
MCH RBC QN AUTO: 30.3 PG (ref 26.8–34.3)
MCHC RBC AUTO-ENTMCNC: 33 G/DL (ref 31.4–37.4)
MCV RBC AUTO: 92 FL (ref 82–98)
MONOCYTES # BLD AUTO: 0.62 THOUSAND/ΜL (ref 0.17–1.22)
MONOCYTES NFR BLD AUTO: 8 % (ref 4–12)
NEUTROPHILS # BLD AUTO: 4.31 THOUSANDS/ΜL (ref 1.85–7.62)
NEUTS SEG NFR BLD AUTO: 59 % (ref 43–75)
NRBC BLD AUTO-RTO: 0 /100 WBCS
PLATELET # BLD AUTO: 276 THOUSANDS/UL (ref 149–390)
PMV BLD AUTO: 9.2 FL (ref 8.9–12.7)
POTASSIUM SERPL-SCNC: 4.1 MMOL/L (ref 3.5–5.3)
PROT SERPL-MCNC: 7.6 G/DL (ref 6.4–8.2)
RBC # BLD AUTO: 4.98 MILLION/UL (ref 3.81–5.12)
SODIUM SERPL-SCNC: 136 MMOL/L (ref 136–145)
TSH SERPL DL<=0.05 MIU/L-ACNC: 2.38 UIU/ML (ref 0.36–3.74)
WBC # BLD AUTO: 7.35 THOUSAND/UL (ref 4.31–10.16)

## 2021-10-15 PROCEDURE — 86665 EPSTEIN-BARR CAPSID VCA: CPT

## 2021-10-15 PROCEDURE — 86663 EPSTEIN-BARR ANTIBODY: CPT

## 2021-10-15 PROCEDURE — 86645 CMV ANTIBODY IGM: CPT

## 2021-10-15 PROCEDURE — 85025 COMPLETE CBC W/AUTO DIFF WBC: CPT

## 2021-10-15 PROCEDURE — 36415 COLL VENOUS BLD VENIPUNCTURE: CPT

## 2021-10-15 PROCEDURE — 86664 EPSTEIN-BARR NUCLEAR ANTIGEN: CPT

## 2021-10-15 PROCEDURE — 84443 ASSAY THYROID STIM HORMONE: CPT

## 2021-10-15 PROCEDURE — 80053 COMPREHEN METABOLIC PANEL: CPT

## 2021-10-15 PROCEDURE — 99213 OFFICE O/P EST LOW 20 MIN: CPT | Performed by: FAMILY MEDICINE

## 2021-10-15 PROCEDURE — 86644 CMV ANTIBODY: CPT

## 2021-10-18 LAB
CMV IGG SERPL IA-ACNC: 3.1 U/ML (ref 0–0.59)
CMV IGM SERPL IA-ACNC: <30 AU/ML (ref 0–29.9)
EBV NA IGG SER IA-ACNC: 130 U/ML (ref 0–17.9)
EBV VCA IGG SER IA-ACNC: 491 U/ML (ref 0–17.9)
EBV VCA IGM SER IA-ACNC: <36 U/ML (ref 0–35.9)
INTERPRETATION: ABNORMAL

## 2021-10-31 DIAGNOSIS — G25.9 FUNCTIONAL MOVEMENT DISORDER: ICD-10-CM

## 2021-10-31 DIAGNOSIS — F41.8 DEPRESSION WITH ANXIETY: ICD-10-CM

## 2021-10-31 DIAGNOSIS — R94.6 ABNORMAL THYROID FUNCTION TEST: ICD-10-CM

## 2021-11-01 RX ORDER — CITALOPRAM 20 MG/1
TABLET ORAL
Qty: 45 TABLET | Refills: 0 | Status: SHIPPED | OUTPATIENT
Start: 2021-11-01 | End: 2021-11-30

## 2021-11-16 ENCOUNTER — OFFICE VISIT (OUTPATIENT)
Dept: FAMILY MEDICINE CLINIC | Facility: CLINIC | Age: 31
End: 2021-11-16
Payer: COMMERCIAL

## 2021-11-16 VITALS
HEART RATE: 87 BPM | TEMPERATURE: 99.5 F | HEIGHT: 65 IN | SYSTOLIC BLOOD PRESSURE: 106 MMHG | WEIGHT: 156.6 LBS | DIASTOLIC BLOOD PRESSURE: 74 MMHG | BODY MASS INDEX: 26.09 KG/M2 | OXYGEN SATURATION: 99 %

## 2021-11-16 DIAGNOSIS — Z23 NEED FOR TDAP VACCINATION: ICD-10-CM

## 2021-11-16 DIAGNOSIS — F41.1 GENERALIZED ANXIETY DISORDER: ICD-10-CM

## 2021-11-16 DIAGNOSIS — F33.2 SEVERE EPISODE OF RECURRENT MAJOR DEPRESSIVE DISORDER, WITHOUT PSYCHOTIC FEATURES (HCC): ICD-10-CM

## 2021-11-16 DIAGNOSIS — Z23 NEED FOR INFLUENZA VACCINATION: Primary | ICD-10-CM

## 2021-11-16 DIAGNOSIS — F43.12 POST-TRAUMATIC STRESS DISORDER, CHRONIC: ICD-10-CM

## 2021-11-16 DIAGNOSIS — G25.9 FUNCTIONAL MOVEMENT DISORDER: ICD-10-CM

## 2021-11-16 PROCEDURE — 90686 IIV4 VACC NO PRSV 0.5 ML IM: CPT | Performed by: FAMILY MEDICINE

## 2021-11-16 PROCEDURE — 90715 TDAP VACCINE 7 YRS/> IM: CPT | Performed by: FAMILY MEDICINE

## 2021-11-16 PROCEDURE — 99214 OFFICE O/P EST MOD 30 MIN: CPT | Performed by: FAMILY MEDICINE

## 2021-11-16 PROCEDURE — 90472 IMMUNIZATION ADMIN EACH ADD: CPT | Performed by: FAMILY MEDICINE

## 2021-11-16 PROCEDURE — 90471 IMMUNIZATION ADMIN: CPT | Performed by: FAMILY MEDICINE

## 2021-11-16 RX ORDER — ARIPIPRAZOLE 5 MG/1
5 TABLET ORAL
Qty: 30 TABLET | Refills: 1 | Status: SHIPPED | OUTPATIENT
Start: 2021-11-16

## 2021-11-24 ENCOUNTER — OFFICE VISIT (OUTPATIENT)
Dept: FAMILY MEDICINE CLINIC | Facility: CLINIC | Age: 31
End: 2021-11-24
Payer: COMMERCIAL

## 2021-11-24 VITALS
HEART RATE: 115 BPM | BODY MASS INDEX: 26.02 KG/M2 | HEIGHT: 65 IN | OXYGEN SATURATION: 97 % | SYSTOLIC BLOOD PRESSURE: 102 MMHG | TEMPERATURE: 98.1 F | DIASTOLIC BLOOD PRESSURE: 70 MMHG | WEIGHT: 156.2 LBS

## 2021-11-24 DIAGNOSIS — F51.05 INSOMNIA DUE TO OTHER MENTAL DISORDER: ICD-10-CM

## 2021-11-24 DIAGNOSIS — F99 INSOMNIA DUE TO OTHER MENTAL DISORDER: ICD-10-CM

## 2021-11-24 DIAGNOSIS — F33.2 SEVERE EPISODE OF RECURRENT MAJOR DEPRESSIVE DISORDER, WITHOUT PSYCHOTIC FEATURES (HCC): Primary | ICD-10-CM

## 2021-11-24 PROCEDURE — 99214 OFFICE O/P EST MOD 30 MIN: CPT | Performed by: FAMILY MEDICINE

## 2021-11-24 RX ORDER — TRAZODONE HYDROCHLORIDE 50 MG/1
50 TABLET ORAL
Qty: 30 TABLET | Refills: 0 | Status: SHIPPED | OUTPATIENT
Start: 2021-11-24 | End: 2022-06-06

## 2021-11-30 DIAGNOSIS — G25.9 FUNCTIONAL MOVEMENT DISORDER: ICD-10-CM

## 2021-11-30 DIAGNOSIS — F41.8 DEPRESSION WITH ANXIETY: ICD-10-CM

## 2021-11-30 DIAGNOSIS — R94.6 ABNORMAL THYROID FUNCTION TEST: ICD-10-CM

## 2021-11-30 RX ORDER — CITALOPRAM 20 MG/1
TABLET ORAL
Qty: 45 TABLET | Refills: 0 | Status: SHIPPED | OUTPATIENT
Start: 2021-11-30

## 2021-12-22 NOTE — RESULT NOTES
Verified Results  (1) HCG QUANT [id number] 12:00AM Corbin Aguilar     Test Name Result Flag Reference   HCG, TOTAL, QN <2 mIU/mL     Reference Range  Nonpregnant or premenopausal    <5  Postmenopausal                 <10     Values from different assay methods may vary  The use of this assay to monitor or to diagnose   patients with cancer or any condition unrelated  to pregnancy has not been cleared or approved by  the FDA or the  of the assay  (1) T4, FREE [id number] 12:00AM Corbin Aguilar     Test Name Result Flag Reference   T4, FREE 1 0 ng/dL  0 8-1 8     (1) CBC/PLT/DIFF [id number] 12:00AM Corbin Aguilar     Test Name Result Flag Reference   WHITE BLOOD CELL COUNT 5 1 Thousand/uL  3 8-10 8   RED BLOOD CELL COUNT 4 88 Million/uL  3 80-5 10   HEMOGLOBIN 14 3 g/dL  11 7-15 5   HEMATOCRIT 44 5 %  35 0-45 0   MCV 91 3 fL  80 0-100 0   MCH 29 4 pg  27 0-33 0   MCHC 32 2 g/dL  32 0-36 0   RDW 14 1 %  11 0-15 0   PLATELET COUNT 234 Thousand/uL  140-400   MPV 7 9 fL  7 5-11 5   ABSOLUTE NEUTROPHILS 2774 cells/uL  2325-9700   ABSOLUTE LYMPHOCYTES 1612 cells/uL  850-3900   ABSOLUTE MONOCYTES 306 cells/uL  200-950   ABSOLUTE EOSINOPHILS 367 cells/uL     ABSOLUTE BASOPHILS 41 cells/uL  0-200   NEUTROPHILS 54 4 %     LYMPHOCYTES 31 6 %     MONOCYTES 6 0 %     EOSINOPHILS 7 2 %     BASOPHILS 0 8 %       (1) COMPREHENSIVE METABOLIC PANEL [id number] 32:42UE Corbin Aguilar     Test Name Result Flag Reference   GLUCOSE 91 mg/dL  65-99   Fasting reference interval   UREA NITROGEN (BUN) 14 mg/dL  7-25   CREATININE 0 59 mg/dL  0 50-1 10   eGFR NON-AFR   AMERICAN 126 mL/min/1 73m2  > OR = 60   eGFR AFRICAN AMERICAN 147 mL/min/1 73m2  > OR = 60   BUN/CREATININE RATIO   5-01   NOT APPLICABLE (calc)   SODIUM 139 mmol/L  135-146   POTASSIUM 4 0 mmol/L  3 5-5 3   CHLORIDE 107 mmol/L     CARBON DIOXIDE 23 mmol/L  20-31   CALCIUM 8 6 mg/dL  8 6-10 2   PROTEIN, TOTAL 6 6 g/dL  6 1-8 1   ALBUMIN 4 2 g/dL 3  6-5 1   GLOBULIN 2 4 g/dL (calc)  1 9-3 7   ALBUMIN/GLOBULIN RATIO 1 8 (calc)  1 0-2 5   BILIRUBIN, TOTAL 0 5 mg/dL  0 2-1 2   ALKALINE PHOSPHATASE 55 U/L     AST 14 U/L  10-30   ALT 10 U/L  6-29     (Q) TSH, 3RD GENERATION [id number] 12:00AM Earle Ray     Test Name Result Flag Reference   TSH 2 15 mIU/L     Reference Range                         > or = 20 Years  0 40-4 50                              Pregnancy Ranges            First trimester    0 26-2 66            Second trimester   0 55-2 73            Third trimester    0 43-2 91 No

## 2022-01-19 PROCEDURE — U0003 INFECTIOUS AGENT DETECTION BY NUCLEIC ACID (DNA OR RNA); SEVERE ACUTE RESPIRATORY SYNDROME CORONAVIRUS 2 (SARS-COV-2) (CORONAVIRUS DISEASE [COVID-19]), AMPLIFIED PROBE TECHNIQUE, MAKING USE OF HIGH THROUGHPUT TECHNOLOGIES AS DESCRIBED BY CMS-2020-01-R: HCPCS | Performed by: FAMILY MEDICINE

## 2022-01-19 PROCEDURE — U0005 INFEC AGEN DETEC AMPLI PROBE: HCPCS | Performed by: FAMILY MEDICINE

## 2022-05-03 ENCOUNTER — APPOINTMENT (OUTPATIENT)
Dept: LAB | Facility: MEDICAL CENTER | Age: 32
End: 2022-05-03
Payer: COMMERCIAL

## 2022-05-03 DIAGNOSIS — Z79.899 ENCOUNTER FOR LONG-TERM (CURRENT) USE OF OTHER MEDICATIONS: ICD-10-CM

## 2022-05-03 LAB
ALBUMIN SERPL BCP-MCNC: 3.9 G/DL (ref 3.5–5)
ALP SERPL-CCNC: 54 U/L (ref 46–116)
ALT SERPL W P-5'-P-CCNC: 21 U/L (ref 12–78)
ANION GAP SERPL CALCULATED.3IONS-SCNC: 4 MMOL/L (ref 4–13)
AST SERPL W P-5'-P-CCNC: 16 U/L (ref 5–45)
BASOPHILS # BLD AUTO: 0.05 THOUSANDS/ΜL (ref 0–0.1)
BASOPHILS NFR BLD AUTO: 1 % (ref 0–1)
BILIRUB SERPL-MCNC: 0.51 MG/DL (ref 0.2–1)
BUN SERPL-MCNC: 13 MG/DL (ref 5–25)
CALCIUM SERPL-MCNC: 9.1 MG/DL (ref 8.3–10.1)
CHLORIDE SERPL-SCNC: 108 MMOL/L (ref 100–108)
CO2 SERPL-SCNC: 27 MMOL/L (ref 21–32)
CREAT SERPL-MCNC: 0.78 MG/DL (ref 0.6–1.3)
EOSINOPHIL # BLD AUTO: 0.23 THOUSAND/ΜL (ref 0–0.61)
EOSINOPHIL NFR BLD AUTO: 5 % (ref 0–6)
ERYTHROCYTE [DISTWIDTH] IN BLOOD BY AUTOMATED COUNT: 13.3 % (ref 11.6–15.1)
GFR SERPL CREATININE-BSD FRML MDRD: 101 ML/MIN/1.73SQ M
GLUCOSE P FAST SERPL-MCNC: 93 MG/DL (ref 65–99)
HCT VFR BLD AUTO: 45.8 % (ref 34.8–46.1)
HGB BLD-MCNC: 14.9 G/DL (ref 11.5–15.4)
IMM GRANULOCYTES # BLD AUTO: 0.01 THOUSAND/UL (ref 0–0.2)
IMM GRANULOCYTES NFR BLD AUTO: 0 % (ref 0–2)
LYMPHOCYTES # BLD AUTO: 1.55 THOUSANDS/ΜL (ref 0.6–4.47)
LYMPHOCYTES NFR BLD AUTO: 36 % (ref 14–44)
MCH RBC QN AUTO: 29.6 PG (ref 26.8–34.3)
MCHC RBC AUTO-ENTMCNC: 32.5 G/DL (ref 31.4–37.4)
MCV RBC AUTO: 91 FL (ref 82–98)
MONOCYTES # BLD AUTO: 0.39 THOUSAND/ΜL (ref 0.17–1.22)
MONOCYTES NFR BLD AUTO: 9 % (ref 4–12)
NEUTROPHILS # BLD AUTO: 2.07 THOUSANDS/ΜL (ref 1.85–7.62)
NEUTS SEG NFR BLD AUTO: 49 % (ref 43–75)
NRBC BLD AUTO-RTO: 0 /100 WBCS
PLATELET # BLD AUTO: 247 THOUSANDS/UL (ref 149–390)
PMV BLD AUTO: 9.1 FL (ref 8.9–12.7)
POTASSIUM SERPL-SCNC: 4.3 MMOL/L (ref 3.5–5.3)
PROT SERPL-MCNC: 7 G/DL (ref 6.4–8.2)
RBC # BLD AUTO: 5.04 MILLION/UL (ref 3.81–5.12)
SODIUM SERPL-SCNC: 139 MMOL/L (ref 136–145)
WBC # BLD AUTO: 4.3 THOUSAND/UL (ref 4.31–10.16)

## 2022-05-03 PROCEDURE — 80053 COMPREHEN METABOLIC PANEL: CPT

## 2022-05-03 PROCEDURE — 36415 COLL VENOUS BLD VENIPUNCTURE: CPT

## 2022-05-03 PROCEDURE — 85025 COMPLETE CBC W/AUTO DIFF WBC: CPT

## 2022-05-17 ENCOUNTER — OFFICE VISIT (OUTPATIENT)
Dept: FAMILY MEDICINE CLINIC | Facility: CLINIC | Age: 32
End: 2022-05-17
Payer: COMMERCIAL

## 2022-05-17 ENCOUNTER — HOSPITAL ENCOUNTER (OUTPATIENT)
Dept: ULTRASOUND IMAGING | Facility: HOSPITAL | Age: 32
Discharge: HOME | End: 2022-05-17
Payer: COMMERCIAL

## 2022-05-17 VITALS
BODY MASS INDEX: 26.55 KG/M2 | RESPIRATION RATE: 18 BRPM | WEIGHT: 165.2 LBS | HEART RATE: 85 BPM | HEIGHT: 66 IN | OXYGEN SATURATION: 98 % | DIASTOLIC BLOOD PRESSURE: 64 MMHG | TEMPERATURE: 98.9 F | SYSTOLIC BLOOD PRESSURE: 118 MMHG

## 2022-05-17 DIAGNOSIS — L72.9 CYST OF SKIN: Primary | ICD-10-CM

## 2022-05-17 DIAGNOSIS — R19.09 UMBILICAL MASS: Primary | ICD-10-CM

## 2022-05-17 DIAGNOSIS — R19.09 UMBILICAL MASS: ICD-10-CM

## 2022-05-17 PROCEDURE — 99213 OFFICE O/P EST LOW 20 MIN: CPT | Performed by: NURSE PRACTITIONER

## 2022-05-17 PROCEDURE — 76705 ECHO EXAM OF ABDOMEN: CPT

## 2022-05-17 RX ORDER — PRAZOSIN HYDROCHLORIDE 2 MG/1
CAPSULE ORAL
COMMUNITY
Start: 2022-03-05

## 2022-05-17 RX ORDER — PRAZOSIN HYDROCHLORIDE 5 MG/1
CAPSULE ORAL
COMMUNITY
Start: 2022-04-25

## 2022-05-17 RX ORDER — AMOXICILLIN AND CLAVULANATE POTASSIUM 875; 125 MG/1; MG/1
1 TABLET, FILM COATED ORAL EVERY 12 HOURS SCHEDULED
Qty: 20 TABLET | Refills: 0 | Status: SHIPPED | OUTPATIENT
Start: 2022-05-17 | End: 2022-05-27

## 2022-05-17 RX ORDER — HYDROXYZINE HYDROCHLORIDE 25 MG/1
TABLET, FILM COATED ORAL
COMMUNITY
Start: 2022-04-27

## 2022-05-17 NOTE — PROGRESS NOTES
Formerly [facility], later [facility]    ASSESSMENT AND PLAN     1  Umbilical mass  Assessment & Plan:  Hernia  Painful  US today  Confirm Fat containing  Consider referral to surgery    Orders:  -     US abdomen limited; Future; Expected date: 05/17/2022         SUBJECTIVE       Patient ID: Tarah Duarte is a 32 y o  female  Chief Complaint   Patient presents with    Abdominal Pain       HISTORY OF PRESENT ILLNESS    Presents with abdominal pain around the umbilicus  Started 4 days ago  No injury or trauma  Randomly occurred mid afternoon  Initially though she ate too much  Pain subsided and she feel asleep for the night  Woke Sunday morning to mild pain with movement or if waist band pushed on it  Worsened over the last 2 days  Woke her several times over last night  Increased if she laid on the area  Couldn't get comfortable  Today with constant pain - just in the belly button are  Describes it as dull, constant pain  Sharp when pushed on  Feels internal  Area hard  Feels like a mass  No  Fever  No change in bowels  LMB today was normal    Abd sx hx:  Cholecystectomy 12 years ago  Three pregnancies with 2 vaginal births  B/L salpingectomy  Notes loss of appetite since Sunday  Initially though it was stress - family member ill  No N/V  The following portions of the patient's history were reviewed and updated as appropriate: allergies, current medications, past family history, past medical history, past social history, past surgical history, and problem list     REVIEW OF SYSTEMS  Review of Systems   Constitutional: Positive for appetite change (decreased)  Negative for fever  Gastrointestinal: Positive for abdominal pain (at umbilicus - as in HPI)         OBJECTIVE      VITAL SIGNS  /64 (BP Location: Left arm, Patient Position: Sitting)   Pulse 85   Temp 98 9 °F (37 2 °C) (Tympanic)   Resp 18   Ht 5' 5 5" (1 664 m)   Wt 74 9 kg (165 lb 3 2 oz)   LMP 04/22/2022   SpO2 98%   BMI 27 07 kg/m²     CURRENT MEDICATIONS    Current Outpatient Medications:     ARIPiprazole (ABILIFY) 5 mg tablet, Take 1 tablet (5 mg total) by mouth daily at bedtime, Disp: 30 tablet, Rfl: 1    citalopram (CeleXA) 20 mg tablet, take 1 and 1/2 tablets by mouth daily, Disp: 45 tablet, Rfl: 0    hydrOXYzine HCL (ATARAX) 25 mg tablet, , Disp: , Rfl:     Multiple Vitamin (MULTIVITAMIN) tablet, Take 1 tablet by mouth daily  , Disp: , Rfl:     prazosin (MINIPRESS) 2 mg capsule, , Disp: , Rfl:     prazosin (MINIPRESS) 5 mg capsule, , Disp: , Rfl:     diclofenac (VOLTAREN) 75 mg EC tablet, Take 1 tablet (75 mg total) by mouth 2 (two) times a day as needed (pain) Take with food  (Patient not taking: No sig reported), Disp: 60 tablet, Rfl: 1    traZODone (DESYREL) 50 mg tablet, Take 1 tablet (50 mg total) by mouth daily at bedtime (Patient not taking: Reported on 5/17/2022), Disp: 30 tablet, Rfl: 0      PHYSICAL EXAMINATION   Physical Exam  Vitals and nursing note reviewed  Constitutional:       General: She is not in acute distress  Appearance: Normal appearance  She is well-developed  She is not ill-appearing  HENT:      Head: Normocephalic  Pulmonary:      Effort: Pulmonary effort is normal  No respiratory distress  Abdominal:      General: Bowel sounds are normal       Palpations: Abdomen is soft  Tenderness: There is abdominal tenderness in the periumbilical area  Hernia: A hernia is present  Hernia is present in the umbilical area  Neurological:      Mental Status: She is alert and oriented to person, place, and time  Psychiatric:         Attention and Perception: Attention normal          Mood and Affect: Mood normal          Speech: Speech normal          Behavior: Behavior normal          Thought Content:  Thought content normal          Cognition and Memory: Cognition normal          Judgment: Judgment normal

## 2022-06-06 ENCOUNTER — TELEMEDICINE (OUTPATIENT)
Dept: FAMILY MEDICINE CLINIC | Facility: CLINIC | Age: 32
End: 2022-06-06
Payer: COMMERCIAL

## 2022-06-06 DIAGNOSIS — J22 LOWER RESPIRATORY INFECTION: Primary | ICD-10-CM

## 2022-06-06 PROCEDURE — 99213 OFFICE O/P EST LOW 20 MIN: CPT | Performed by: FAMILY MEDICINE

## 2022-06-06 RX ORDER — BENZONATATE 100 MG/1
100 CAPSULE ORAL 3 TIMES DAILY PRN
Qty: 20 CAPSULE | Refills: 0 | Status: SHIPPED | OUTPATIENT
Start: 2022-06-06 | End: 2022-07-20

## 2022-06-06 RX ORDER — DOXYCYCLINE HYCLATE 100 MG/1
100 CAPSULE ORAL EVERY 12 HOURS SCHEDULED
Qty: 20 CAPSULE | Refills: 0 | Status: SHIPPED | OUTPATIENT
Start: 2022-06-06 | End: 2022-06-16

## 2022-06-06 RX ORDER — ALBUTEROL SULFATE 90 UG/1
2 AEROSOL, METERED RESPIRATORY (INHALATION) EVERY 6 HOURS PRN
Qty: 18 G | Refills: 0 | Status: SHIPPED | OUTPATIENT
Start: 2022-06-06 | End: 2022-07-20

## 2022-06-06 RX ORDER — PREDNISONE 10 MG/1
TABLET ORAL
Qty: 30 TABLET | Refills: 0 | Status: SHIPPED | OUTPATIENT
Start: 2022-06-06 | End: 2022-07-20

## 2022-06-06 NOTE — ASSESSMENT & PLAN NOTE
Advised abx and steroids given concerns for worsening respiratory infection; home COVID19 was negative and no known exposures; if no improvement with treatment in the next 48-72 hours advised recheck or CXR; f/u guidance given should sx worsen or not improve

## 2022-07-20 ENCOUNTER — APPOINTMENT (EMERGENCY)
Dept: RADIOLOGY | Facility: HOSPITAL | Age: 32
End: 2022-07-20
Payer: COMMERCIAL

## 2022-07-20 ENCOUNTER — HOSPITAL ENCOUNTER (EMERGENCY)
Facility: HOSPITAL | Age: 32
Discharge: HOME | End: 2022-07-20
Attending: EMERGENCY MEDICINE
Payer: COMMERCIAL

## 2022-07-20 ENCOUNTER — APPOINTMENT (OUTPATIENT)
Dept: LAB | Facility: HOSPITAL | Age: 32
End: 2022-07-20
Payer: COMMERCIAL

## 2022-07-20 ENCOUNTER — OFFICE VISIT (OUTPATIENT)
Dept: FAMILY MEDICINE CLINIC | Facility: CLINIC | Age: 32
End: 2022-07-20
Payer: COMMERCIAL

## 2022-07-20 ENCOUNTER — APPOINTMENT (OUTPATIENT)
Dept: RADIOLOGY | Facility: CLINIC | Age: 32
End: 2022-07-20
Payer: COMMERCIAL

## 2022-07-20 VITALS
OXYGEN SATURATION: 97 % | TEMPERATURE: 98.4 F | SYSTOLIC BLOOD PRESSURE: 118 MMHG | BODY MASS INDEX: 27.2 KG/M2 | RESPIRATION RATE: 20 BRPM | WEIGHT: 166.01 LBS | DIASTOLIC BLOOD PRESSURE: 70 MMHG | HEART RATE: 85 BPM

## 2022-07-20 VITALS
DIASTOLIC BLOOD PRESSURE: 86 MMHG | WEIGHT: 168.6 LBS | RESPIRATION RATE: 20 BRPM | TEMPERATURE: 97.8 F | SYSTOLIC BLOOD PRESSURE: 124 MMHG | OXYGEN SATURATION: 97 % | HEART RATE: 96 BPM | HEIGHT: 66 IN | BODY MASS INDEX: 27.1 KG/M2

## 2022-07-20 DIAGNOSIS — R06.02 SOB (SHORTNESS OF BREATH) ON EXERTION: ICD-10-CM

## 2022-07-20 DIAGNOSIS — R07.9 CHEST PAIN: Primary | ICD-10-CM

## 2022-07-20 DIAGNOSIS — R06.02 SOB (SHORTNESS OF BREATH) ON EXERTION: Primary | ICD-10-CM

## 2022-07-20 DIAGNOSIS — Z11.4 SCREENING FOR HIV (HUMAN IMMUNODEFICIENCY VIRUS): ICD-10-CM

## 2022-07-20 DIAGNOSIS — Z82.49 FAMILY HISTORY OF EARLY CAD: ICD-10-CM

## 2022-07-20 DIAGNOSIS — R53.83 FATIGUE, UNSPECIFIED TYPE: ICD-10-CM

## 2022-07-20 DIAGNOSIS — R00.2 PALPITATION: ICD-10-CM

## 2022-07-20 DIAGNOSIS — Z11.59 NEED FOR HEPATITIS C SCREENING TEST: ICD-10-CM

## 2022-07-20 DIAGNOSIS — Z11.52 ENCOUNTER FOR SCREENING FOR COVID-19: ICD-10-CM

## 2022-07-20 DIAGNOSIS — R06.09 DOE (DYSPNEA ON EXERTION): ICD-10-CM

## 2022-07-20 LAB
ALBUMIN SERPL BCP-MCNC: 3.9 G/DL (ref 3.5–5)
ALBUMIN SERPL BCP-MCNC: 4 G/DL (ref 3.5–5)
ALP SERPL-CCNC: 62 U/L (ref 46–116)
ALP SERPL-CCNC: 63 U/L (ref 46–116)
ALT SERPL W P-5'-P-CCNC: 19 U/L (ref 12–78)
ALT SERPL W P-5'-P-CCNC: 24 U/L (ref 12–78)
ANION GAP SERPL CALCULATED.3IONS-SCNC: 10 MMOL/L (ref 4–13)
ANION GAP SERPL CALCULATED.3IONS-SCNC: 9 MMOL/L (ref 4–13)
AST SERPL W P-5'-P-CCNC: 14 U/L (ref 5–45)
AST SERPL W P-5'-P-CCNC: 17 U/L (ref 5–45)
ATRIAL RATE: 91 BPM
B BURGDOR IGG+IGM SER-ACNC: <0.2 AI
BACTERIA UR QL AUTO: ABNORMAL /HPF
BASOPHILS # BLD AUTO: 0.05 THOUSANDS/ΜL (ref 0–0.1)
BASOPHILS # BLD AUTO: 0.05 THOUSANDS/ΜL (ref 0–0.1)
BASOPHILS NFR BLD AUTO: 1 % (ref 0–1)
BASOPHILS NFR BLD AUTO: 1 % (ref 0–1)
BILIRUB SERPL-MCNC: 0.29 MG/DL (ref 0.2–1)
BILIRUB SERPL-MCNC: 0.32 MG/DL (ref 0.2–1)
BILIRUB UR QL STRIP: NEGATIVE
BUN SERPL-MCNC: 14 MG/DL (ref 5–25)
BUN SERPL-MCNC: 23 MG/DL (ref 5–25)
CALCIUM SERPL-MCNC: 8.6 MG/DL (ref 8.3–10.1)
CALCIUM SERPL-MCNC: 9.1 MG/DL (ref 8.3–10.1)
CARDIAC TROPONIN I PNL SERPL HS: <2 NG/L
CARDIAC TROPONIN I PNL SERPL HS: <2 NG/L (ref 8–18)
CHLORIDE SERPL-SCNC: 102 MMOL/L (ref 96–108)
CHLORIDE SERPL-SCNC: 104 MMOL/L (ref 96–108)
CHOLEST SERPL-MCNC: 194 MG/DL
CK SERPL-CCNC: 128 U/L (ref 26–192)
CLARITY UR: CLEAR
CO2 SERPL-SCNC: 27 MMOL/L (ref 21–32)
CO2 SERPL-SCNC: 29 MMOL/L (ref 21–32)
COLOR UR: YELLOW
CREAT SERPL-MCNC: 0.79 MG/DL (ref 0.6–1.3)
CREAT SERPL-MCNC: 0.84 MG/DL (ref 0.6–1.3)
D DIMER PPP FEU-MCNC: <0.27 UG/ML FEU
D DIMER PPP FEU-MCNC: <0.27 UG/ML FEU
EOSINOPHIL # BLD AUTO: 0.09 THOUSAND/ΜL (ref 0–0.61)
EOSINOPHIL # BLD AUTO: 0.09 THOUSAND/ΜL (ref 0–0.61)
EOSINOPHIL NFR BLD AUTO: 1 % (ref 0–6)
EOSINOPHIL NFR BLD AUTO: 2 % (ref 0–6)
ERYTHROCYTE [DISTWIDTH] IN BLOOD BY AUTOMATED COUNT: 13.2 % (ref 11.6–15.1)
ERYTHROCYTE [DISTWIDTH] IN BLOOD BY AUTOMATED COUNT: 13.2 % (ref 11.6–15.1)
EXT PREG TEST URINE: NORMAL
EXT. CONTROL ED NAV: NORMAL
GFR SERPL CREATININE-BSD FRML MDRD: 92 ML/MIN/1.73SQ M
GFR SERPL CREATININE-BSD FRML MDRD: 99 ML/MIN/1.73SQ M
GLUCOSE P FAST SERPL-MCNC: 99 MG/DL (ref 65–99)
GLUCOSE SERPL-MCNC: 96 MG/DL (ref 65–140)
GLUCOSE UR STRIP-MCNC: NEGATIVE MG/DL
HCT VFR BLD AUTO: 45.2 % (ref 34.8–46.1)
HCT VFR BLD AUTO: 45.7 % (ref 34.8–46.1)
HCV AB SER QL: NORMAL
HDLC SERPL-MCNC: 49 MG/DL
HGB BLD-MCNC: 15.1 G/DL (ref 11.5–15.4)
HGB BLD-MCNC: 15.1 G/DL (ref 11.5–15.4)
HGB UR QL STRIP.AUTO: ABNORMAL
IMM GRANULOCYTES # BLD AUTO: 0.01 THOUSAND/UL (ref 0–0.2)
IMM GRANULOCYTES # BLD AUTO: 0.03 THOUSAND/UL (ref 0–0.2)
IMM GRANULOCYTES NFR BLD AUTO: 0 % (ref 0–2)
IMM GRANULOCYTES NFR BLD AUTO: 0 % (ref 0–2)
KETONES UR STRIP-MCNC: ABNORMAL MG/DL
LDLC SERPL CALC-MCNC: 134 MG/DL (ref 0–100)
LEUKOCYTE ESTERASE UR QL STRIP: NEGATIVE
LYMPHOCYTES # BLD AUTO: 1.28 THOUSANDS/ΜL (ref 0.6–4.47)
LYMPHOCYTES # BLD AUTO: 1.74 THOUSANDS/ΜL (ref 0.6–4.47)
LYMPHOCYTES NFR BLD AUTO: 23 % (ref 14–44)
LYMPHOCYTES NFR BLD AUTO: 26 % (ref 14–44)
MAGNESIUM SERPL-MCNC: 1.9 MG/DL (ref 1.6–2.6)
MCH RBC QN AUTO: 29.3 PG (ref 26.8–34.3)
MCH RBC QN AUTO: 29.6 PG (ref 26.8–34.3)
MCHC RBC AUTO-ENTMCNC: 33 G/DL (ref 31.4–37.4)
MCHC RBC AUTO-ENTMCNC: 33.4 G/DL (ref 31.4–37.4)
MCV RBC AUTO: 89 FL (ref 82–98)
MCV RBC AUTO: 89 FL (ref 82–98)
MONOCYTES # BLD AUTO: 0.4 THOUSAND/ΜL (ref 0.17–1.22)
MONOCYTES # BLD AUTO: 0.56 THOUSAND/ΜL (ref 0.17–1.22)
MONOCYTES NFR BLD AUTO: 8 % (ref 4–12)
MONOCYTES NFR BLD AUTO: 8 % (ref 4–12)
NEUTROPHILS # BLD AUTO: 3.01 THOUSANDS/ΜL (ref 1.85–7.62)
NEUTROPHILS # BLD AUTO: 5.02 THOUSANDS/ΜL (ref 1.85–7.62)
NEUTS SEG NFR BLD AUTO: 63 % (ref 43–75)
NEUTS SEG NFR BLD AUTO: 67 % (ref 43–75)
NITRITE UR QL STRIP: NEGATIVE
NON-SQ EPI CELLS URNS QL MICRO: ABNORMAL /HPF
NRBC BLD AUTO-RTO: 0 /100 WBCS
NRBC BLD AUTO-RTO: 0 /100 WBCS
NT-PROBNP SERPL-MCNC: 31 PG/ML
NT-PROBNP SERPL-MCNC: 39 PG/ML
P AXIS: 53 DEGREES
PH UR STRIP.AUTO: 6 [PH]
PLATELET # BLD AUTO: 227 THOUSANDS/UL (ref 149–390)
PLATELET # BLD AUTO: 234 THOUSANDS/UL (ref 149–390)
PMV BLD AUTO: 8.7 FL (ref 8.9–12.7)
PMV BLD AUTO: 8.8 FL (ref 8.9–12.7)
POTASSIUM SERPL-SCNC: 4.2 MMOL/L (ref 3.5–5.3)
POTASSIUM SERPL-SCNC: 4.2 MMOL/L (ref 3.5–5.3)
PR INTERVAL: 152 MS
PROT SERPL-MCNC: 7.5 G/DL (ref 6.4–8.4)
PROT SERPL-MCNC: 7.6 G/DL (ref 6.4–8.4)
PROT UR STRIP-MCNC: NEGATIVE MG/DL
QRS AXIS: 93 DEGREES
QRSD INTERVAL: 72 MS
QT INTERVAL: 366 MS
QTC INTERVAL: 450 MS
RBC # BLD AUTO: 5.1 MILLION/UL (ref 3.81–5.12)
RBC # BLD AUTO: 5.16 MILLION/UL (ref 3.81–5.12)
RBC #/AREA URNS AUTO: ABNORMAL /HPF
SODIUM SERPL-SCNC: 140 MMOL/L (ref 135–147)
SODIUM SERPL-SCNC: 141 MMOL/L (ref 135–147)
SP GR UR STRIP.AUTO: >=1.03 (ref 1–1.03)
T WAVE AXIS: 57 DEGREES
TRIGL SERPL-MCNC: 57 MG/DL
TSH SERPL DL<=0.05 MIU/L-ACNC: 2.17 UIU/ML (ref 0.45–4.5)
TSH SERPL DL<=0.05 MIU/L-ACNC: 2.98 UIU/ML (ref 0.45–4.5)
UROBILINOGEN UR QL STRIP.AUTO: 0.2 E.U./DL
VENTRICULAR RATE: 91 BPM
WBC # BLD AUTO: 4.84 THOUSAND/UL (ref 4.31–10.16)
WBC # BLD AUTO: 7.49 THOUSAND/UL (ref 4.31–10.16)
WBC #/AREA URNS AUTO: ABNORMAL /HPF

## 2022-07-20 PROCEDURE — U0003 INFECTIOUS AGENT DETECTION BY NUCLEIC ACID (DNA OR RNA); SEVERE ACUTE RESPIRATORY SYNDROME CORONAVIRUS 2 (SARS-COV-2) (CORONAVIRUS DISEASE [COVID-19]), AMPLIFIED PROBE TECHNIQUE, MAKING USE OF HIGH THROUGHPUT TECHNOLOGIES AS DESCRIBED BY CMS-2020-01-R: HCPCS | Performed by: FAMILY MEDICINE

## 2022-07-20 PROCEDURE — 85379 FIBRIN DEGRADATION QUANT: CPT

## 2022-07-20 PROCEDURE — 99285 EMERGENCY DEPT VISIT HI MDM: CPT | Performed by: EMERGENCY MEDICINE

## 2022-07-20 PROCEDURE — 83735 ASSAY OF MAGNESIUM: CPT | Performed by: EMERGENCY MEDICINE

## 2022-07-20 PROCEDURE — 84484 ASSAY OF TROPONIN QUANT: CPT | Performed by: EMERGENCY MEDICINE

## 2022-07-20 PROCEDURE — 82550 ASSAY OF CK (CPK): CPT

## 2022-07-20 PROCEDURE — 99285 EMERGENCY DEPT VISIT HI MDM: CPT

## 2022-07-20 PROCEDURE — 99214 OFFICE O/P EST MOD 30 MIN: CPT | Performed by: FAMILY MEDICINE

## 2022-07-20 PROCEDURE — 93000 ELECTROCARDIOGRAM COMPLETE: CPT | Performed by: FAMILY MEDICINE

## 2022-07-20 PROCEDURE — 93005 ELECTROCARDIOGRAM TRACING: CPT

## 2022-07-20 PROCEDURE — 80061 LIPID PANEL: CPT

## 2022-07-20 PROCEDURE — 93010 ELECTROCARDIOGRAM REPORT: CPT | Performed by: INTERNAL MEDICINE

## 2022-07-20 PROCEDURE — 84484 ASSAY OF TROPONIN QUANT: CPT

## 2022-07-20 PROCEDURE — 84443 ASSAY THYROID STIM HORMONE: CPT

## 2022-07-20 PROCEDURE — 86803 HEPATITIS C AB TEST: CPT

## 2022-07-20 PROCEDURE — 85379 FIBRIN DEGRADATION QUANT: CPT | Performed by: EMERGENCY MEDICINE

## 2022-07-20 PROCEDURE — 85025 COMPLETE CBC W/AUTO DIFF WBC: CPT | Performed by: EMERGENCY MEDICINE

## 2022-07-20 PROCEDURE — 83880 ASSAY OF NATRIURETIC PEPTIDE: CPT | Performed by: EMERGENCY MEDICINE

## 2022-07-20 PROCEDURE — 96360 HYDRATION IV INFUSION INIT: CPT

## 2022-07-20 PROCEDURE — 80053 COMPREHEN METABOLIC PANEL: CPT | Performed by: EMERGENCY MEDICINE

## 2022-07-20 PROCEDURE — 86618 LYME DISEASE ANTIBODY: CPT

## 2022-07-20 PROCEDURE — 84443 ASSAY THYROID STIM HORMONE: CPT | Performed by: EMERGENCY MEDICINE

## 2022-07-20 PROCEDURE — 81001 URINALYSIS AUTO W/SCOPE: CPT | Performed by: EMERGENCY MEDICINE

## 2022-07-20 PROCEDURE — U0005 INFEC AGEN DETEC AMPLI PROBE: HCPCS | Performed by: FAMILY MEDICINE

## 2022-07-20 PROCEDURE — 81025 URINE PREGNANCY TEST: CPT | Performed by: EMERGENCY MEDICINE

## 2022-07-20 PROCEDURE — 85025 COMPLETE CBC W/AUTO DIFF WBC: CPT

## 2022-07-20 PROCEDURE — 87389 HIV-1 AG W/HIV-1&-2 AB AG IA: CPT

## 2022-07-20 PROCEDURE — 36415 COLL VENOUS BLD VENIPUNCTURE: CPT

## 2022-07-20 PROCEDURE — 71046 X-RAY EXAM CHEST 2 VIEWS: CPT

## 2022-07-20 RX ADMIN — SODIUM CHLORIDE 1000 ML: 0.9 INJECTION, SOLUTION INTRAVENOUS at 16:57

## 2022-07-20 NOTE — ED NOTES
Ambulatory pulse ox of 98%      Bisi Ford RN  07/20/22 [id number]
Provider at bedside       Tiarra Guerrier RN  07/20/22 1270
Skin normal color for race, warm, dry and intact. No evidence of rash.

## 2022-07-20 NOTE — PROGRESS NOTES
Assessment/Plan:     1  SOB (shortness of breath) on exertion  Assessment & Plan: Will check labs, CXR, and COVID19; EKG shows NSR with sinus arrhythmia; given family history will also check stress test and refer to cardiology; advised any worsening of symptoms to go directly to ER; strict ER guidance given    Orders:  -     Lipid Panel with Direct LDL reflex; Future  -     CBC and differential; Future  -     D-dimer, quantitative; Future  -     Comprehensive metabolic panel; Future  -     TSH, 3rd generation; Future  -     NT-BNP PRO; Future  -     XR chest pa & lateral; Future; Expected date: 07/20/2022  -     CK (with reflex to MB); Future  -     Ambulatory Referral to Cardiology; Future  -     Echo stress test, exercise; Future; Expected date: 07/20/2022  -     TROPONIN T, HIGH SENSITIVITY ([facility]); Future  -     Lyme Antibody Profile with reflex to WB; Future    2  Family history of early CAD  -     Ambulatory Referral to Cardiology; Future  -     Echo stress test, exercise; Future; Expected date: 07/20/2022    3  Need for hepatitis C screening test  -     Hepatitis C Antibody ([facility]); Future    4  Screening for HIV (human immunodeficiency virus)  -     HIV 1/2 Antigen/Antibody (4th Generation) w Reflex [facility]; Future    5  Encounter for screening for [address]    6  Fatigue, unspecified type  -     Lyme Antibody Profile with reflex to WB; Future        Subjective:      Patient ID: Isai Larkin is a 28 y o  female  Patient presents today with concerns of worsening fatigued for the last 2-2 5 weeks  She states by dinnertime she is ready for bed  Doing more activity or less does not make a difference  She also complains of a headache that starts about mid afternoon  She denies any CP  She does feel SOB with exercise as well as palpitations  She states any cardio she has to take a break and prior to a few weeks ago she would not need to do that  No cough or wheezing  No recent travel or surgery  Did have lower respiratory infection in June but symptoms completely resolved before this started  Father's side has strong Fhx of CAD in both father and brother, but unsure what kind  Had similar symptoms when she had COVID19 and last had COVID19 in January  LMP 7/6/22  The following portions of the patient's history were reviewed and updated as appropriate: allergies, current medications, past family history, past medical history, past social history, past surgical history, and problem list     Review of Systems   Constitutional: Positive for activity change and fatigue  Negative for diaphoresis  Respiratory: Positive for shortness of breath  Cardiovascular: Positive for palpitations  Negative for chest pain and leg swelling  Gastrointestinal: Negative for nausea and vomiting  Genitourinary: Negative for vaginal bleeding  Neurological: Positive for headaches  Psychiatric/Behavioral: The patient is nervous/anxious  Objective:      /86 (BP Location: Right arm, Patient Position: Sitting, Cuff Size: Adult)   Pulse 96   Temp 97 8 °F (36 6 °C) (Temporal)   Resp 20   Ht 5' 5 5" (1 664 m)   Wt 76 5 kg (168 lb 9 6 oz)   LMP 07/09/2022 (Exact Date)   SpO2 97%   BMI 27 63 kg/m²          Physical Exam  Vitals reviewed  Constitutional:       General: She is not in acute distress  Appearance: Normal appearance  She is well-developed  She is not ill-appearing, toxic-appearing or diaphoretic  HENT:      Head: Normocephalic and atraumatic  Eyes:      General: No scleral icterus  Right eye: No discharge  Left eye: No discharge  Conjunctiva/sclera: Conjunctivae normal    Cardiovascular:      Rate and Rhythm: Normal rate and regular rhythm  Pulses: Normal pulses  Heart sounds: Normal heart sounds  No murmur heard  No gallop  Pulmonary:      Effort: Pulmonary effort is normal  No respiratory distress        Breath sounds: Normal breath sounds  No stridor  No wheezing, rhonchi or rales  Chest:      Chest wall: No tenderness  Musculoskeletal:      Cervical back: Neck supple  Right lower leg: No tenderness  No edema  Left lower leg: No tenderness  No edema  Neurological:      General: No focal deficit present  Mental Status: She is alert and oriented to person, place, and time  Psychiatric:         Mood and Affect: Mood normal          Behavior: Behavior normal          Thought Content:  Thought content normal          Judgment: Judgment normal

## 2022-07-20 NOTE — ASSESSMENT & PLAN NOTE
Will check labs, CXR, and COVID19; EKG shows NSR with sinus arrhythmia; given family history will also check stress test and refer to cardiology; advised any worsening of symptoms to go directly to ER; strict ER guidance given

## 2022-07-20 NOTE — ED PROVIDER NOTES
History  Chief Complaint   Patient presents with    Chest Pain     Patient reports chest pressure/heaviness/tightness  Has had fatigue for the past two weeks with increased heart rate with activity  Seen at PCP for same today      Patient is a 80-year-old female coming in today with chest pain, palpitations  Patient states that she has a history of anxiety depression otherwise healthy  She reports that over the past 2 weeks she has been having increasing fatigue  She has worsening of this over the past 2 weeks  She has no fevers, chills, nausea, vomiting, diarrhea  No abnormal vaginal heavy bleeding spotting or discharge  She has no recent travel, sick contacts recent antibiotic use  No abdominal pain  She has had no headache, tinnitus or syncope  She states over the past 4 5 days she has been having worsening shortness of breath and chest heaviness  She went to her PCP day  She reports that they did lab work in syndrome  When she walked up the stairs at her house she checked her pulse and was in the 120s and O2 sats was 87  She describes as a pressure heaviness in her chest intermittently with feeling of palpitations  This does not radiate into the neck, jaw, back or upper extremities  She denies any paresthesias throughout the bilateral upper extremities  She has no syncope or lower extremity edema  She is not on birth control  No family history of clotting disorders  Strong family history of early coronary disease  Patient did not take anything for this        History provided by:  Patient  Chest Pain  Pain location:  Substernal area  Pain quality: aching, dull and pressure    Pain radiates to:  Does not radiate  Pain radiates to the back: no    Pain severity:  Mild  Onset quality:  Gradual  Timing:  Intermittent  Progression:  Waxing and waning  Chronicity:  New  Context: movement    Relieved by:  None tried  Worsened by:  Nothing tried  Ineffective treatments:  None tried  Associated symptoms: palpitations and shortness of breath    Associated symptoms: no abdominal pain, no AICD problem, no altered mental status, no anorexia, no anxiety, no back pain, no claudication, no cough, no diaphoresis, no dizziness, no dysphagia, no fatigue, no fever, no headache, no heartburn, no lower extremity edema, no nausea, no near-syncope, no numbness, no orthopnea, no PND, no syncope, not vomiting and no weakness    Risk factors: no aortic disease, no birth control, no coronary artery disease, no diabetes mellitus, no Cami-Danlos syndrome, no high cholesterol, no hypertension, no immobilization, not male, no Marfan's syndrome, not obese, no prior DVT/PE, no smoking and no surgery        Prior to Admission Medications   Prescriptions Last Dose Informant Patient Reported? Taking? ARIPiprazole (ABILIFY) 5 mg tablet   No No   Sig: Take 1 tablet (5 mg total) by mouth daily at bedtime   Multiple Vitamin (MULTIVITAMIN) tablet  Self Yes No   Sig: Take 1 tablet by mouth daily  citalopram (CeleXA) 20 mg tablet   No No   Sig: take 1 and 1/2 tablets by mouth daily   hydrOXYzine HCL (ATARAX) 25 mg tablet   Yes No   prazosin (MINIPRESS) 2 mg capsule   Yes No   prazosin (MINIPRESS) 5 mg capsule   Yes No      Facility-Administered Medications: None       Past Medical History:   Diagnosis Date    Anxiety     Depression     Pilonidal cyst with abscess     Seasonal allergies     Varicella        Past Surgical History:   Procedure Laterality Date    CHOLECYSTECTOMY  08/2010    Laparoscopic    COLONOSCOPY N/A 5/9/2018    Procedure: COLONOSCOPY;  Surgeon: Arsen Souza MD;  Location: [facility] GI LAB;   Service: Gastroenterology    FL LAP,RMV  ADNEXAL STRUCTURE Bilateral 10/27/2016    Procedure: SALPINGECTOMY;  Surgeon: Amira Alatorre MD;  Location: [facility] OR;  Service: Gynecology    FL [employer] N/A 4/28/2016    Procedure: EXCISION PILONIDAL CYST;  Surgeon: Antonino Bassett MD;  Location: [facility] OR;  Service: General    SALPINGECTOMY      WISDOM TOOTH EXTRACTION  01/2016    right upper only       Family History   Problem Relation Age of Onset    Seizures Mother     Other Mother         Epilepsy    Mental illness Mother     Dementia Mother     Schizophrenia Mother     Suicide Attempts Mother     Dysrhythmia Father     Hypertension Father     Stroke Father     Other Father         Cardiac disorder, cardiac pacemaker     Heart disease Father     Coronary artery disease Paternal Grandfather     Cancer Family      I have reviewed and agree with the history as documented  E-Cigarette/Vaping    E-Cigarette Use Never User      E-Cigarette/Vaping Substances    Nicotine No     THC No     CBD No     Flavoring No     Other No     Unknown No      Social History     Tobacco Use    Smoking status: Never Smoker    Smokeless tobacco: Never Used   Vaping Use    Vaping Use: Never used   Substance Use Topics    Alcohol use: No    Drug use: No       Review of Systems   Constitutional: Negative  Negative for chills, diaphoresis, fatigue and fever  HENT: Negative for ear pain, sore throat and trouble swallowing  Eyes: Negative for pain and visual disturbance  Respiratory: Positive for shortness of breath  Negative for cough  Cardiovascular: Positive for chest pain and palpitations  Negative for orthopnea, claudication, syncope, PND and near-syncope  Gastrointestinal: Negative  Negative for abdominal pain, anorexia, heartburn, nausea and vomiting  Genitourinary: Negative  Negative for dysuria and hematuria  Musculoskeletal: Negative  Negative for arthralgias and back pain  Skin: Negative  Negative for color change and rash  Neurological: Negative for dizziness, seizures, syncope, weakness, numbness and headaches  Hematological: Negative  Psychiatric/Behavioral: Negative  All other systems reviewed and are negative  Physical Exam  Physical Exam  Vitals and nursing note reviewed  Constitutional:       General: She is not in acute distress  Appearance: She is well-developed  HENT:      Head: Normocephalic and atraumatic  Comments: Patient maintaining airway and secretions  No stridor   No brawniness under tongue  Eyes:      Conjunctiva/sclera: Conjunctivae normal    Cardiovascular:      Rate and Rhythm: Normal rate and regular rhythm  Pulses:           Radial pulses are 2+ on the right side and 2+ on the left side  Dorsalis pedis pulses are 2+ on the right side and 2+ on the left side  Heart sounds: Normal heart sounds, S1 normal and S2 normal  No murmur heard  Pulmonary:      Effort: Pulmonary effort is normal  No respiratory distress  Breath sounds: Normal breath sounds  Abdominal:      Palpations: Abdomen is soft  Tenderness: There is no abdominal tenderness  Musculoskeletal:         General: Normal range of motion  Cervical back: Neck supple  Skin:     General: Skin is warm and dry  Capillary Refill: Capillary refill takes less than 2 seconds  Neurological:      General: No focal deficit present  Mental Status: She is alert and oriented to person, place, and time  GCS: GCS eye subscore is 4  GCS verbal subscore is 5  GCS motor subscore is 6  Cranial Nerves: Cranial nerves are intact  Sensory: Sensation is intact  Motor: Motor function is intact  Coordination: Coordination is intact  Gait: Gait is intact     Psychiatric:         Mood and Affect: Mood normal          Behavior: Behavior normal          Vital Signs  ED Triage Vitals [07/20/22 1605]   Temperature Pulse Respirations Blood Pressure SpO2   98 4 °F (36 9 °C) 102 20 138/77 99 %      Temp Source Heart Rate Source Patient Position - Orthostatic VS BP Location FiO2 (%)   Oral Monitor Sitting Right arm --      Pain Score       4           Vitals:    07/20/22 1605 07/20/22 1707   BP: 138/77 118/70   Pulse: 102 85   Patient Position - Orthostatic VS: Sitting Sitting         Visual Acuity      ED Medications  Medications   sodium chloride 0 9 % bolus 1,000 mL (0 mL Intravenous Stopped 7/20/22 1825)       Diagnostic Studies  Results Reviewed     Procedure Component Value Units Date/Time    D-Dimer [[id number]]  (Normal) Collected: 07/20/22 1656    Lab Status: Final result Specimen: Blood from Arm, Left Updated: 07/20/22 1809     D-Dimer, Quant <0 27 ug/ml FEU     Magnesium [[id number]]  (Normal) Collected: 07/20/22 1656    Lab Status: Final result Specimen: Blood from Arm, Left Updated: 07/20/22 1735     Magnesium 1 9 mg/dL     NT-BNP PRO [[id number]]  (Normal) Collected: 07/20/22 1656    Lab Status: Final result Specimen: Blood from Arm, Left Updated: 07/20/22 1735     NT-proBNP 31 pg/mL     TSH [[id number]]  (Normal) Collected: 07/20/22 1656    Lab Status: Final result Specimen: Blood from Arm, Left Updated: 07/20/22 1735     TSH 3RD GENERATON 2 175 uIU/mL     Narrative:      Patients undergoing fluorescein dye angiography may retain small amounts of fluorescein in the body for 48-72 hours post procedure  Samples containing fluorescein can produce falsely depressed TSH values  If the patient had this procedure,a specimen should be resubmitted post fluorescein clearance        Urine Microscopic [[id number]]  (Abnormal) Collected: 07/20/22 1716    Lab Status: Final result Specimen: Urine, Clean Catch Updated: 07/20/22 1734     RBC, UA 0-1 /hpf      WBC, UA 0-1 /hpf      Epithelial Cells Occasional /hpf      Bacteria, UA Occasional /hpf     UA (URINE) with reflex to Scope [[id number]]  (Abnormal) Collected: 07/20/22 1716    Lab Status: Final result Specimen: Urine, Clean Catch Updated: 07/20/22 1722     Color, UA Yellow     Clarity, UA Clear     Specific Gravity, UA >=1 030     pH, UA 6 0     Leukocytes, UA Negative     Nitrite, UA Negative     Protein, UA Negative mg/dl      Glucose, UA Negative mg/dl      Ketones, UA Trace mg/dl      Urobilinogen, UA 0 2 E U /dl      Bilirubin, UA Negative     Occult Blood, UA Small    POCT pregnancy, urine [[id number]]  (Normal) Resulted: 07/20/22 1720    Lab Status: Final result Updated: 07/20/22 1720     EXT PREG TEST UR (Ref: Negative) NEG(-)     Control Valid    Comprehensive metabolic panel [[id number]] Collected: 07/20/22 1620    Lab Status: Final result Specimen: Blood from Arm, Left Updated: 07/20/22 1702     Sodium 140 mmol/L      Potassium 4 2 mmol/L      Chloride 102 mmol/L      CO2 29 mmol/L      ANION GAP 9 mmol/L      BUN 23 mg/dL      Creatinine 0 84 mg/dL      Glucose 96 mg/dL      Calcium 9 1 mg/dL      AST 17 U/L      ALT 24 U/L      Alkaline Phosphatase 63 U/L      Total Protein 7 6 g/dL      Albumin 4 0 g/dL      Total Bilirubin 0 32 mg/dL      eGFR 92 ml/min/1 73sq m     Narrative:      [place] guidelines for Chronic Kidney Disease (CKD):     Stage 1 with normal or high GFR (GFR > 90 mL/min/1 73 square meters)    Stage 2 Mild CKD (GFR = 60-89 mL/min/1 73 square meters)    Stage 3A Moderate CKD (GFR = 45-59 mL/min/1 73 square meters)    Stage 3B Moderate CKD (GFR = 30-44 mL/min/1 73 square meters)    Stage 4 Severe CKD (GFR = 15-29 mL/min/1 73 square meters)    Stage 5 End Stage CKD (GFR <15 mL/min/1 73 square meters)  Note: GFR calculation is accurate only with a steady state creatinine    HS Troponin 0hr (reflex protocol) [[id number]]  (Normal) Collected: 07/20/22 1620    Lab Status: Final result Specimen: Blood from Arm, Left Updated: 07/20/22 1652     hs TnI 0hr <2 ng/L     CBC and differential [[id number]]  (Abnormal) Collected: 07/20/22 1620    Lab Status: Final result Specimen: Blood from Arm, Left Updated: 07/20/22 1628     WBC 7 49 Thousand/uL      RBC 5 10 Million/uL      Hemoglobin 15 1 g/dL      Hematocrit 45 2 %      MCV 89 fL      MCH 29 6 pg      MCHC 33 4 g/dL      RDW 13 2 %      MPV 8 7 fL      Platelets 666 Thousands/uL      nRBC 0 /100 WBCs      Neutrophils Relative 67 %      Immat GRANS % 0 %      Lymphocytes Relative 23 %      Monocytes Relative 8 %      Eosinophils Relative 1 %      Basophils Relative 1 %      Neutrophils Absolute 5 02 Thousands/µL      Immature Grans Absolute 0 03 Thousand/uL      Lymphocytes Absolute 1 74 Thousands/µL      Monocytes Absolute 0 56 Thousand/µL      Eosinophils Absolute 0 09 Thousand/µL      Basophils Absolute 0 05 Thousands/µL                  No orders to display              Procedures  Procedures         ED Course  ED Course as of 07/20/22 1937 Wed Jul 20, 2022   1640 Patient is a 26-year-old female coming in today with chest pain  On exam patient is tachycardic and therefore will as D-dimer  She was at her PCP this morning and sent in for worsening symptoms  On exam otherwise maintaining airway and secretions no acute distress  Will review chart as well as repeat labs    Portions of the record may have been created with voice recognition software  Occasional wrong word or "sound a like" substitutions may have occurred due to the inherent limitations of voice recognition software  Read the chart carefully and recognize, using context, where substitutions have occurred  1658 Patient was seen at PCP - referral to cardiology as well as Echo/stress  Trop x 2 negative with Heart score <3  Chest xray taken today was read as WNL as well     1743 Patient's labs without acute pathology  Electrolytes stable  Urine clear except for small ketones and blood  Pending D-dimer   1809 D-dimer negative   1816 Long discussion with patient and family  Discussed with ketones and blood and urine as well as workup here an outpatient  Turn to ER instructions given  Patient does have the referral for Cardiology as well as an echo stress which I instructed her to follow up with    Will plan for DC home after ambulatory    Counseling: I had a detailed discussion with the patient and/or guardian regarding: the historical points, exam findings, and any diagnostic results supporting the discharge diagnosis, lab results, radiology results, discharge instructions reviewed with patient and/or family/caregiver and understanding was verbalized  Instructions given to return to the emergency department if symptoms worsen or persist, or if there are any questions or concerns that arise at home  1821 Patient ambulated around the ER without any syncope or chest pain  Heart rate between 100-110 with stable O2 sats  Will continue with DC home             HEART Risk Score    Flowsheet Row Most Recent Value   Heart Score Risk Calculator    History 0 Filed at: 07/20/2022 1657   ECG 1 Filed at: 07/20/2022 1657   Age 0 Filed at: 07/20/2022 1657   Risk Factors 1 Filed at: 07/20/2022 1657   Troponin 0 Filed at: 07/20/2022 1657   HEART Score 2 Filed at: 07/20/2022 1657                PERC Rule for [address] Most Recent Value   PERC Rule for PE    Age >=50 0 Filed at: 07/20/2022 1642   HR >=100 1 Filed at: 07/20/2022 1642   O2 Sat on room air < 95% 0 Filed at: 07/20/2022 1642   History of PE or DVT 0 Filed at: 07/20/2022 1642   Recent trauma or surgery 0 Filed at: 07/20/2022 1642   Hemoptysis 0 Filed at: 07/20/2022 1642   Exogenous estrogen 0 Filed at: 07/20/2022 1642   Unilateral leg swelling 0 Filed at: 07/20/2022 1642   PERC Rule for PE Results 1 Filed at: 07/20/2022 1642              SBIRT 20yo+    Flowsheet Row Most Recent Value   SBIRT (25 yo +)    In order to provide better care to our patients, we are screening all of our patients for alcohol and drug use  Would it be okay to ask you these screening questions?  Unable to answer at this time Filed at: 07/20/2022 1731          Wells' Criteria for PE    Flowsheet Row Most Recent Value   Wells' Criteria for PE    Clinical signs and symptoms of DVT 0 Filed at: 07/20/2022 1642   PE is primary diagnosis or equally likely 0 Filed at: 07/20/2022 1642   HR >100 1 5 Filed at: 07/20/2022 1642 Immobilization at least 3 days or Surgery in the previous 4 weeks 0 Filed at: 07/20/2022 1642   Previous, objectively diagnosed PE or DVT 0 Filed at: 07/20/2022 1642   Hemoptysis 0 Filed at: 07/20/2022 1642   Malignancy with treatment within 6 months or palliative 0 Filed at: 07/20/2022 1642   Wells' Criteria Total 1 5 Filed at: 07/20/2022 1642                [facility]  Number of Diagnoses or Management Options  Diagnosis management comments:     EKG INTERPRETATION @ 1612  RHYTHM:  Normal sinus rhythm at 90 beats per minute  AXIS:  Normal axis  INTERVALS:  CT interval measured 152 milliseconds  QRS COMPLEX:  QRS measured at 72 milliseconds  ST SEGMENT:  Nonspecific ST segment changes  Diffuse artifact  QT INTERVAL:  QTC measured at 450 milliseconds  COMPARED WITH PRIOR   Interpretation by Grace Lau DO    Different diagnosis : ACS, NSTEMI, pleurisry, pneumothorax, costochondritis, pneumonia, GERD, anxiety, hepatitis, pancreatitis, aortic dissection, pericarditis, myocarditis, pericardial effusion, asthma exacerbation, COPD exacerbation, herpes zoster, peritoneal rupture, esophageal spasm      Differential diagnosis includes but not limited to:  COPD exacerbation, asthma exacerbation, pneumonia, PE, pulmonary edema, pulmonary effusions, lung mass/malignancy, CHF, ACS, NSTEMI, acute kidney injury, electrolyte dysfunction, inhalation injury, anemia, valvular disorder, viral etiology,           Amount and/or Complexity of Data Reviewed  Clinical lab tests: ordered and reviewed  Tests in the radiology section of CPT®: ordered and reviewed  Tests in the medicine section of CPT®: ordered and reviewed  Review and summarize past medical records: yes  Independent visualization of images, tracings, or specimens: yes        Disposition  Final diagnoses:   Chest pain   Palpitation   WELL[place] (dyspnea on exertion)     Time reflects when diagnosis was documented in both MDM as applicable and the Disposition within this note     Time User Action Codes Description Comment    7/20/2022  6:21 PM Waldemar Din Add [R07 9] Chest pain     7/20/2022  6:21 PM Waldemar Din Add [R00 2] Palpitation     7/20/2022  6:21 PM Edwin Sharminzafar Jaimes Add [R06 00] WELL[place] (dyspnea on exertion)       ED Disposition     ED Disposition   Discharge    Condition   Stable    Date/Time   Wed Jul 20, 2022  6:21 PM    Comment   Stefany Floor discharge to home/self care  Follow-up Information     Follow up With Specialties Details Why Contact Info Additional [facility] Medicine Schedule an appointment as soon as possible for a visit in 1 week  [address], [address] [address]  [address], [address], [address] [place], [place], [address]    [facility] In 1 week  [address]  [address]        [address] [facility] In 3 days  [facility] [address]  [address], [address] [place], [place], [address] [phone]          Discharge Medication List as of 7/20/2022  6:21 PM      CONTINUE these medications which have NOT CHANGED    Details   ARIPiprazole (ABILIFY) 5 mg tablet Take 1 tablet (5 mg total) by mouth daily at bedtime, Starting Tue 11/16/2021, Normal      citalopram (CeleXA) 20 mg tablet take 1 and 1/2 tablets by mouth daily, Normal      hydrOXYzine HCL (ATARAX) 25 mg tablet Starting Wed 4/27/2022, Historical Med      Multiple Vitamin (MULTIVITAMIN) tablet Take 1 tablet by mouth daily  , Until Discontinued, Historical Med      !! prazosin (MINIPRESS) 2 mg capsule Starting Sat 3/5/2022, Historical Med      !! prazosin (MINIPRESS) 5 mg capsule Starting Mon 4/25/2022, Historical Med       !! - Potential duplicate medications found   Please discuss with provider  No discharge procedures on file      PDMP Review       Value Time User    PDMP Reviewed  Yes 8/30/2021  4:40 PM Quan Horan DO          ED Provider  Electronically Signed by           Vincent Reeder DO  07/20/22 5307

## 2022-07-21 LAB
HIV 1+2 AB+HIV1 P24 AG SERPL QL IA: NORMAL
SARS-COV-2 RNA RESP QL NAA+PROBE: NEGATIVE

## 2022-07-27 ENCOUNTER — HOSPITAL ENCOUNTER (OUTPATIENT)
Dept: NON INVASIVE DIAGNOSTICS | Facility: HOSPITAL | Age: 32
Discharge: HOME | End: 2022-07-27
Payer: COMMERCIAL

## 2022-07-27 VITALS — HEIGHT: 65 IN | WEIGHT: 166 LBS | BODY MASS INDEX: 27.66 KG/M2

## 2022-07-27 DIAGNOSIS — R06.02 SOB (SHORTNESS OF BREATH) ON EXERTION: ICD-10-CM

## 2022-07-27 DIAGNOSIS — Z82.49 FAMILY HISTORY OF EARLY CAD: ICD-10-CM

## 2022-07-27 PROCEDURE — 93350 STRESS TTE ONLY: CPT

## 2022-07-28 LAB
BASELINE ST DEPRESSION: 0 MM
E WAVE DECELERATION TIME: 147 MS
GLOBAL LONGITUIDAL STRAIN: -18 %
MAX HR PERCENT: 93 %
MAX HR: 176 BPM
MV E'TISSUE VEL-LAT: 18 CM/S
MV E'TISSUE VEL-SEP: 12 CM/S
MV PEAK A VEL: 0.66 M/S
MV PEAK E VEL: 65 CM/S
MV STENOSIS PRESSURE HALF TIME: 43 MS
MV VALVE AREA P 1/2 METHOD: 5.12 CM2
RATE PRESSURE PRODUCT: NORMAL
SL CV LV EF: 60
SL CV STRESS RECOVERY BP: NORMAL MMHG
SL CV STRESS RECOVERY HR: 98 BPM
SL CV STRESS RECOVERY O2 SAT: 99 %
SL CV STRESS STAGE REACHED: 1
STRESS ANGINA INDEX: 1
STRESS BASELINE BP: NORMAL MMHG
STRESS BASELINE HR: 102 BPM
STRESS DUKE TREADMILL SCORE: -1
STRESS O2 SAT REST: 98 %
STRESS PEAK HR: 176 BPM
STRESS PERCENT HR: 93 %
STRESS POST ESTIMATED WORKLOAD: 4.9 METS
STRESS POST EXERCISE DUR MIN: 3 MIN
STRESS POST EXERCISE DUR SEC: 16 SEC
STRESS POST O2 SAT PEAK: 97 %
STRESS POST PEAK BP: 132 MMHG
STRESS ST DEPRESSION: 0 MM
TR MAX PG: 8 MMHG
TR PEAK VELOCITY: 1.4 M/S
TRICUSPID VALVE PEAK REGURGITATION VELOCITY: 1.43 M/S

## 2022-07-28 PROCEDURE — 93351 STRESS TTE COMPLETE: CPT | Performed by: INTERNAL MEDICINE

## 2022-07-29 LAB
CHEST PAIN STATEMENT: NORMAL
MAX DIASTOLIC BP: 70 MMHG
MAX HEART RATE: 176 BPM
MAX PREDICTED HEART RATE: 188 BPM
MAX. SYSTOLIC BP: 132 MMHG
PROTOCOL NAME: NORMAL
REASON FOR TERMINATION: NORMAL
TARGET HR FORMULA: NORMAL
TEST INDICATION: NORMAL
TIME IN EXERCISE PHASE: NORMAL

## 2022-08-17 ENCOUNTER — ANNUAL EXAM (OUTPATIENT)
Dept: FAMILY MEDICINE CLINIC | Facility: CLINIC | Age: 32
End: 2022-08-17
Payer: COMMERCIAL

## 2022-08-17 VITALS
BODY MASS INDEX: 28.26 KG/M2 | TEMPERATURE: 98.3 F | SYSTOLIC BLOOD PRESSURE: 116 MMHG | HEART RATE: 97 BPM | HEIGHT: 65 IN | RESPIRATION RATE: 18 BRPM | OXYGEN SATURATION: 97 % | DIASTOLIC BLOOD PRESSURE: 80 MMHG | WEIGHT: 169.6 LBS

## 2022-08-17 DIAGNOSIS — Z01.419 WELL WOMAN EXAM: Primary | ICD-10-CM

## 2022-08-17 DIAGNOSIS — Z12.4 CERVICAL CANCER SCREENING: ICD-10-CM

## 2022-08-17 DIAGNOSIS — Z00.00 ANNUAL PHYSICAL EXAM: ICD-10-CM

## 2022-08-17 PROCEDURE — 99395 PREV VISIT EST AGE 18-39: CPT | Performed by: FAMILY MEDICINE

## 2022-08-17 NOTE — PROGRESS NOTES
ADULT ANNUAL [address] [place] [address] MEDICAL GROUP    NAME: Maile Paget  AGE: 28 y o  SEX: female  : 1990     DATE: 2022     Assessment and Plan:     Problem List Items Addressed This Visit    None     Visit Diagnoses     Well woman exam    -  Primary    Relevant Orders    Thinprep Pap and HPV mRNA E6/E7 (Completed)    Cervical cancer screening        Relevant Orders    Thinprep Pap and HPV mRNA E6/E7 (Completed)    Annual physical exam              Immunizations and preventive care screenings were discussed with patient today  Appropriate education was printed on patient's after visit summary  Counseling:  Alcohol/drug use: discussed moderation in alcohol intake, the recommendations for healthy alcohol use, and avoidance of illicit drug use  Dental Health: discussed importance of regular tooth brushing, flossing, and dental visits  Sexual health: discussed sexually transmitted diseases, partner selection, use of condoms, avoidance of unintended pregnancy, and contraceptive alternatives  Exercise: the importance of regular exercise/physical activity was discussed  Recommend exercise 3-5 times per week for at least 30 minutes  BMI Counseling: Body mass index is 28 22 kg/m²  The BMI is above normal  Nutrition recommendations include encouraging healthy choices of fruits and vegetables, moderation in carbohydrate intake and increasing intake of lean protein  Exercise recommendations include moderate physical activity 150 minutes/week  No pharmacotherapy was ordered  Rationale for BMI follow-up plan is due to patient being overweight or obese  No follow-ups on file  Chief Complaint:     Chief Complaint   Patient presents with    Gynecologic Exam     PAP      History of Present Illness:     Adult Annual Physical   Patient here for a comprehensive physical exam  The patient reports no problems            Depression Screening  PHQ-2/9 Depression Screening             /GYN Health  Last menstrual period: 8/2/2022  Contraceptive method: tubal   History of STDs?: no      Review of Systems:     Review of Systems   Past Medical History:     Past Medical History:   Diagnosis Date    Anxiety     Depression     Pilonidal cyst with abscess     Seasonal allergies     Varicella     Wears glasses       Past Surgical History:     Past Surgical History:   Procedure Laterality Date    CARDIAC CATHETERIZATION  8/29/2022    Procedure: Cardiac catheterization;  Surgeon: Ken Sánchez MD;  [facility]; Service: Cardiology    CARDIAC CATHETERIZATION N/A 8/29/2022    Procedure: Cardiac Coronary Angiogram;  Surgeon: Ken Sánchez MD;  [facility]; Service: Cardiology    CHOLECYSTECTOMY  08/2010    Laparoscopic    COLONOSCOPY N/A 5/9/2018    Procedure: COLONOSCOPY;  Surgeon: Marda Dubin, MD;  Location: [facility];   Service: Gastroenterology    [place] LAP,RMV  ADNEXAL STRUCTURE Bilateral 10/27/2016    Procedure: SALPINGECTOMY;  Surgeon: Fabiola Garsia MD;  Location: [facility];  Service: Gynecology    [place] [address] N/A 4/28/2016    Procedure: EXCISION PILONIDAL CYST;  Surgeon: Tammie Elliott MD;  Location: [facility];  Service: General    SALPINGECTOMY      WISDOM TOOTH EXTRACTION  01/2016    right upper only      Social History:     Social History     Socioeconomic History    Marital status: /Civil Union     Spouse name: None    Number of children: 2    Years of education: None    Highest education level: None   Occupational History    Occupation: homemaker   Tobacco Use    Smoking status: Never Smoker    Smokeless tobacco: Never Used   Vaping Use    Vaping Use: Never used   Substance and Sexual Activity    Alcohol use: No    Drug use: No    Sexual activity: Yes     Partners: Male     Birth control/protection: Female Sterilization   Other Topics Concern    None   Social History Narrative        Sikhism affiliation     Social Determinants of Health     Financial Resource Strain: Not on file   Food Insecurity: Not on file   Transportation Needs: Not on file   Physical Activity: Not on file   Stress: Not on file   Social Connections: Not on file   Intimate Partner Violence: Not on file   Housing Stability: Not on file      Family History:     Family History   Problem Relation Age of Onset    Seizures Mother     Other Mother         Epilepsy    Mental illness Mother     Dementia Mother     Schizophrenia Mother     Suicide Attempts Mother     Dysrhythmia Father     Hypertension Father     Stroke Father     Other Father         Cardiac disorder, cardiac pacemaker     Heart disease Father     Coronary artery disease Paternal Grandfather     Cancer Family       Current Medications:     Current Outpatient Medications   Medication Sig Dispense Refill    ARIPiprazole (ABILIFY) 5 mg tablet Take 1 tablet (5 mg total) by mouth daily at bedtime 30 tablet 1    citalopram (CeleXA) 20 mg tablet take 1 and 1/2 tablets by mouth daily 45 tablet 0    hydrOXYzine HCL (ATARAX) 25 mg tablet       Multiple Vitamin (MULTIVITAMIN) tablet Take 1 tablet by mouth daily   prazosin (MINIPRESS) 5 mg capsule Take 10 mg by mouth       No current facility-administered medications for this visit  Allergies: Allergies   Allergen Reactions    Gluten Meal - Food Allergy       Physical Exam:     /80 (BP Location: Right arm, Patient Position: Sitting, Cuff Size: Adult)   Pulse 97   Temp 98 3 °F (36 8 °C) (Temporal)   Resp 18   Ht 5' 5" (1 651 m)   Wt 76 9 kg (169 lb 9 6 oz)   LMP 08/02/2022 (Exact Date)   SpO2 97%   BMI 28 22 kg/m²     Physical Exam  Vitals and nursing note reviewed  Constitutional:       General: She is not in acute distress  Appearance: She is well-developed  HENT:      Head: Normocephalic and atraumatic     Eyes:      Conjunctiva/sclera: Conjunctivae normal    Cardiovascular:      Rate and Rhythm: Normal rate and regular rhythm  Heart sounds: No murmur heard  Pulmonary:      Effort: Pulmonary effort is normal  No respiratory distress  Breath sounds: Normal breath sounds  Abdominal:      Palpations: Abdomen is soft  Tenderness: There is no abdominal tenderness  Genitourinary:     Labia:         Right: No lesion  Left: No lesion  Vagina: No vaginal discharge  Cervix: Normal       Adnexa:         Right: No tenderness  Left: No tenderness  Musculoskeletal:      Cervical back: Neck supple  Lymphadenopathy:      Lower Body: No right inguinal adenopathy  No left inguinal adenopathy  Skin:     General: Skin is warm and dry  Neurological:      Mental Status: She is alert and oriented to person, place, and time     Psychiatric:         Mood and Affect: Mood normal           Isabella Lesches, [facility] [address]

## 2022-08-17 NOTE — PATIENT INSTRUCTIONS

## 2022-08-22 ENCOUNTER — PREP FOR PROCEDURE (OUTPATIENT)
Dept: CARDIOLOGY CLINIC | Facility: CLINIC | Age: 32
End: 2022-08-22

## 2022-08-22 ENCOUNTER — TELEPHONE (OUTPATIENT)
Dept: CARDIOLOGY CLINIC | Facility: CLINIC | Age: 32
End: 2022-08-22

## 2022-08-22 ENCOUNTER — OFFICE VISIT (OUTPATIENT)
Dept: CARDIOLOGY CLINIC | Facility: CLINIC | Age: 32
End: 2022-08-22
Payer: COMMERCIAL

## 2022-08-22 VITALS
DIASTOLIC BLOOD PRESSURE: 78 MMHG | HEIGHT: 65 IN | WEIGHT: 167.2 LBS | HEART RATE: 96 BPM | SYSTOLIC BLOOD PRESSURE: 110 MMHG | OXYGEN SATURATION: 98 % | BODY MASS INDEX: 27.86 KG/M2

## 2022-08-22 DIAGNOSIS — R06.02 SOB (SHORTNESS OF BREATH) ON EXERTION: ICD-10-CM

## 2022-08-22 DIAGNOSIS — Z82.49 FAMILY HISTORY OF EARLY CAD: ICD-10-CM

## 2022-08-22 DIAGNOSIS — R06.02 SOB (SHORTNESS OF BREATH) ON EXERTION: Primary | ICD-10-CM

## 2022-08-22 DIAGNOSIS — R07.89 CHEST PRESSURE: Primary | ICD-10-CM

## 2022-08-22 DIAGNOSIS — R07.9 CHEST PAIN ON EXERTION: Primary | ICD-10-CM

## 2022-08-22 PROCEDURE — 99214 OFFICE O/P EST MOD 30 MIN: CPT | Performed by: INTERNAL MEDICINE

## 2022-08-22 NOTE — PROGRESS NOTES
Consult - Cardiology   Marika Jo 28 y o  female MRN: [id number]        Problems    Problem List Items Addressed This Visit        Other    SOB (shortness of breath) on exertion      Other Visit Diagnoses     Chest pain on exertion    -  Primary    Family history of early CAD                Plan          Reason for Consult / Principal Problem:  Chest pain and shortness of breath with exertion  HPI: Marika Jo is a 28y o  year old female        Patient is a 68-year-old female coming in today with chest pain, palpitations  Patient states that she has a history of anxiety depression otherwise healthy  She reports that over the past 2 weeks she has been having increasing fatigue  She has worsening of this over the past 2 weeks  She has no fevers, chills, nausea, vomiting, diarrhea  No abnormal vaginal heavy bleeding spotting or discharge  She has no recent travel, sick contacts recent antibiotic use  No abdominal pain  She has had no headache, tinnitus or syncope  She states over the past 4 5 days she has been having worsening shortness of breath and chest heaviness  She went to her PCP day  She reports that they did lab work in syndrome  When she walked up the stairs at her house she checked her pulse and was in the 120s and O2 sats was 87  She describes as a pressure heaviness in her chest intermittently with feeling of palpitations  This does not radiate into the neck, jaw, back or upper extremities  She denies any paresthesias throughout the bilateral upper extremities  She has no syncope or lower extremity edema  She is not on birth control  No family history of clotting disorders  Strong family history of early coronary disease  Patient did not take anything for this      This young woman is brought is significantly limited  His a clear-cut repetitive history of limitation when doing any type of exertion such as walking up a hill or going up steps      She is short of breath palpitations and chest pressure  She recently had a stress echo  She exercised for only 3 minutes developing a maximal heart rate and almost had syncope       She also developed chest pressure  The stress echo itself showed no segmental abnormalities suggest ischemia her left ventricular function is normal     The echo part did not suggested any restrictive myocardial disease  She is very positive family history of early death in the father side arm  He had early heart attacks and sudden death  Apparently her brother of 20 years is told he has a heart condition but she does not know what type  Her LDL is 134 and HDL 49  Her thyroid studies are normal     I think we have to rule out epicardial coronary disease  I do not as yet started on a calcium antagonist     In addition may end up also ordering a MRI  Of note with her very limited stress test she also had a hypertensive response  She felt like fainting but she had a normal blood pressure, in fact a exaggerated blood pressure    Review of Systems   Constitutional: Negative  Respiratory: Positive for chest tightness and shortness of breath  Cardiovascular: Positive for chest pain  Past Medical History:   Diagnosis Date    Anxiety     Depression     Pilonidal cyst with abscess     Seasonal allergies     Varicella      Past Surgical History:   Procedure Laterality Date    CHOLECYSTECTOMY  08/2010    Laparoscopic    COLONOSCOPY N/A 5/9/2018    Procedure: COLONOSCOPY;  Surgeon: Cassie Restrepo MD;  Location: [facility];   Service: Gastroenterology    [place] LAP,RMV  ADNEXAL STRUCTURE Bilateral 10/27/2016    Procedure: SALPINGECTOMY;  Surgeon: Micael Ahumada, MD;  Location: [facility] OR;  Service: Gynecology    [place] [address] N/A 4/28/2016    Procedure: EXCISION PILONIDAL CYST;  Surgeon: Josefina Willis MD;  Location: [facility];  Service: General    SALPINGECTOMY      WISDOM TOOTH EXTRACTION  01/2016    right upper only Social History     Substance and Sexual Activity   Alcohol Use No     Social History     Substance and Sexual Activity   Drug Use No     Social History     Tobacco Use   Smoking Status Never Smoker   Smokeless Tobacco Never Used     Family History   Problem Relation Age of Onset    Seizures Mother     Other Mother         Epilepsy    Mental illness Mother     Dementia Mother     Schizophrenia Mother     Suicide Attempts Mother     Dysrhythmia Father     Hypertension Father     Stroke Father     Other Father         Cardiac disorder, cardiac pacemaker     Heart disease Father     Coronary artery disease Paternal Grandfather     Cancer Family        Allergies: Allergies   Allergen Reactions    Gluten Meal - Food Allergy        Medications:     Current Outpatient Medications:     ARIPiprazole (ABILIFY) 5 mg tablet, Take 1 tablet (5 mg total) by mouth daily at bedtime, Disp: 30 tablet, Rfl: 1    citalopram (CeleXA) 20 mg tablet, take 1 and 1/2 tablets by mouth daily, Disp: 45 tablet, Rfl: 0    hydrOXYzine HCL (ATARAX) 25 mg tablet, , Disp: , Rfl:     Multiple Vitamin (MULTIVITAMIN) tablet, Take 1 tablet by mouth daily  , Disp: , Rfl:     prazosin (MINIPRESS) 2 mg capsule, , Disp: , Rfl:     prazosin (MINIPRESS) 5 mg capsule, , Disp: , Rfl:       Physical Exam  Constitutional:       Appearance: She is normal weight  Cardiovascular:      Rate and Rhythm: Normal rate and regular rhythm  Pulses: Normal pulses  Heart sounds: No murmur heard  Comments: No murmur detected with Valsalva  Pulmonary:      Effort: Pulmonary effort is normal    Musculoskeletal:      Right lower leg: No edema  Left lower leg: No edema  Skin:     General: Skin is warm and dry  Coloration: Skin is not jaundiced or pale  Neurological:      Mental Status: She is alert and oriented to person, place, and time     Psychiatric:         Behavior: Behavior normal            Laboratory Studies:  CMP:  Lab Results   Component Value Date    CREATININE 0 84 07/20/2022    CREATININE 0 83 01/20/2018    BUN 23 07/20/2022    BUN 13 01/20/2018     01/20/2018    K 4 2 07/20/2022    K 4 6 01/20/2018     07/20/2022     01/20/2018    CO2 29 07/20/2022    CO2 26 01/20/2018    PROT 7 4 01/20/2018    ALKPHOS 63 07/20/2022    ALKPHOS 55 01/20/2018    ALT 24 07/20/2022    ALT 12 01/20/2018    AST 17 07/20/2022    AST 16 01/20/2018     NT-proBNP:   Lab Results   Component Value Date    NTBNP 31 07/20/2022      CBC:  Lab Results   Component Value Date    WBC 7 49 07/20/2022    WBC 4 9 01/20/2018    RBC 5 10 07/20/2022    RBC 5 02 01/20/2018    HGB 15 1 07/20/2022    HGB 15 6 (H) 01/20/2018    HCT 45 2 07/20/2022    HCT 45 2 (H) 01/20/2018    MCV 89 07/20/2022    MCV 90 0 01/20/2018    MCH 29 6 07/20/2022    MCH 31 1 01/20/2018    RDW 13 2 07/20/2022    RDW 12 5 01/20/2018     07/20/2022     01/20/2018     Coags:    Lipid Profile:   No results found for: CHOL  Lab Results   Component Value Date    HDL 49 (L) 07/20/2022     Lab Results   Component Value Date    LDLCALC 134 (H) 07/20/2022     Lab Results   Component Value Date    TRIG 57 07/20/2022       Cardiac testi reviewed her EKGs  They are normal  No results found for this or any previous visit  No results found for this or any previous visit  No results found for this or any previous visit  No results found for this or any previous visit  Emely Valles MD    Portions of the record may have been created with voice recognition software   Occasional wrong word or "sound a like" substitutions may have occurred due to the inherent limitations of voice recognition software   Read the chart carefully and recognize, using context, where substitutions have occurred

## 2022-08-22 NOTE — H&P (VIEW-ONLY)
Consult - Cardiology   Olamide Power 28 y o  female MRN: [id number]        Problems    Problem List Items Addressed This Visit        Other    SOB (shortness of breath) on exertion      Other Visit Diagnoses     Chest pain on exertion    -  Primary    Family history of early CAD                Plan          Reason for Consult / Principal Problem:  Chest pain and shortness of breath with exertion  HPI: Olamide Power is a 28y o  year old female        Patient is a 66-year-old female coming in today with chest pain, palpitations  Patient states that she has a history of anxiety depression otherwise healthy  She reports that over the past 2 weeks she has been having increasing fatigue  She has worsening of this over the past 2 weeks  She has no fevers, chills, nausea, vomiting, diarrhea  No abnormal vaginal heavy bleeding spotting or discharge  She has no recent travel, sick contacts recent antibiotic use  No abdominal pain  She has had no headache, tinnitus or syncope  She states over the past 4 5 days she has been having worsening shortness of breath and chest heaviness  She went to her PCP day  She reports that they did lab work in syndrome  When she walked up the stairs at her house she checked her pulse and was in the 120s and O2 sats was 87  She describes as a pressure heaviness in her chest intermittently with feeling of palpitations  This does not radiate into the neck, jaw, back or upper extremities  She denies any paresthesias throughout the bilateral upper extremities  She has no syncope or lower extremity edema  She is not on birth control  No family history of clotting disorders  Strong family history of early coronary disease  Patient did not take anything for this      This young woman is brought is significantly limited  His a clear-cut repetitive history of limitation when doing any type of exertion such as walking up a hill or going up steps      She is short of breath palpitations and chest pressure  She recently had a stress echo  She exercised for only 3 minutes developing a maximal heart rate and almost had syncope       She also developed chest pressure  The stress echo itself showed no segmental abnormalities suggest ischemia her left ventricular function is normal     The echo part did not suggested any restrictive myocardial disease  She is very positive family history of early death in the father side arm  He had early heart attacks and sudden death  Apparently her brother of 20 years is told he has a heart condition but she does not know what type  Her LDL is 134 and HDL 49  Her thyroid studies are normal     I think we have to rule out epicardial coronary disease  I do not as yet started on a calcium antagonist     In addition may end up also ordering a MRI  Of note with her very limited stress test she also had a hypertensive response  She felt like fainting but she had a normal blood pressure, in fact a exaggerated blood pressure    Review of Systems   Constitutional: Negative  Respiratory: Positive for chest tightness and shortness of breath  Cardiovascular: Positive for chest pain  Past Medical History:   Diagnosis Date    Anxiety     Depression     Pilonidal cyst with abscess     Seasonal allergies     Varicella      Past Surgical History:   Procedure Laterality Date    CHOLECYSTECTOMY  08/2010    Laparoscopic    COLONOSCOPY N/A 5/9/2018    Procedure: COLONOSCOPY;  Surgeon: Yunior Abreu MD;  Location: [facility] GI LAB;   Service: Gastroenterology    [place] LAP,RMV  ADNEXAL STRUCTURE Bilateral 10/27/2016    Procedure: SALPINGECTOMY;  Surgeon: Dany Lazar MD;  Location: [facility] OR;  Service: Gynecology    [place] [address] N/A 4/28/2016    Procedure: EXCISION PILONIDAL CYST;  Surgeon: Radha Mehta MD;  Location: [facility] OR;  Service: General    SALPINGECTOMY      WISDOM TOOTH EXTRACTION  01/2016    right upper only Social History     Substance and Sexual Activity   Alcohol Use No     Social History     Substance and Sexual Activity   Drug Use No     Social History     Tobacco Use   Smoking Status Never Smoker   Smokeless Tobacco Never Used     Family History   Problem Relation Age of Onset    Seizures Mother     Other Mother         Epilepsy    Mental illness Mother     Dementia Mother     Schizophrenia Mother     Suicide Attempts Mother     Dysrhythmia Father     Hypertension Father     Stroke Father     Other Father         Cardiac disorder, cardiac pacemaker     Heart disease Father     Coronary artery disease Paternal Grandfather     Cancer Family        Allergies: Allergies   Allergen Reactions    Gluten Meal - Food Allergy        Medications:     Current Outpatient Medications:     ARIPiprazole (ABILIFY) 5 mg tablet, Take 1 tablet (5 mg total) by mouth daily at bedtime, Disp: 30 tablet, Rfl: 1    citalopram (CeleXA) 20 mg tablet, take 1 and 1/2 tablets by mouth daily, Disp: 45 tablet, Rfl: 0    hydrOXYzine HCL (ATARAX) 25 mg tablet, , Disp: , Rfl:     Multiple Vitamin (MULTIVITAMIN) tablet, Take 1 tablet by mouth daily  , Disp: , Rfl:     prazosin (MINIPRESS) 2 mg capsule, , Disp: , Rfl:     prazosin (MINIPRESS) 5 mg capsule, , Disp: , Rfl:       Physical Exam  Constitutional:       Appearance: She is normal weight  Cardiovascular:      Rate and Rhythm: Normal rate and regular rhythm  Pulses: Normal pulses  Heart sounds: No murmur heard  Comments: No murmur detected with Valsalva  Pulmonary:      Effort: Pulmonary effort is normal    Musculoskeletal:      Right lower leg: No edema  Left lower leg: No edema  Skin:     General: Skin is warm and dry  Coloration: Skin is not jaundiced or pale  Neurological:      Mental Status: She is alert and oriented to person, place, and time     Psychiatric:         Behavior: Behavior normal            Laboratory Studies:  CMP:  Lab Results   Component Value Date    CREATININE 0 84 07/20/2022    CREATININE 0 83 01/20/2018    BUN 23 07/20/2022    BUN 13 01/20/2018     01/20/2018    K 4 2 07/20/2022    K 4 6 01/20/2018     07/20/2022     01/20/2018    CO2 29 07/20/2022    CO2 26 01/20/2018    PROT 7 4 01/20/2018    ALKPHOS 63 07/20/2022    ALKPHOS 55 01/20/2018    ALT 24 07/20/2022    ALT 12 01/20/2018    AST 17 07/20/2022    AST 16 01/20/2018     NT-proBNP:   Lab Results   Component Value Date    NTBNP 31 07/20/2022      CBC:  Lab Results   Component Value Date    WBC 7 49 07/20/2022    WBC 4 9 01/20/2018    RBC 5 10 07/20/2022    RBC 5 02 01/20/2018    HGB 15 1 07/20/2022    HGB 15 6 (H) 01/20/2018    HCT 45 2 07/20/2022    HCT 45 2 (H) 01/20/2018    MCV 89 07/20/2022    MCV 90 0 01/20/2018    MCH 29 6 07/20/2022    MCH 31 1 01/20/2018    RDW 13 2 07/20/2022    RDW 12 5 01/20/2018     07/20/2022     01/20/2018     Coags:    Lipid Profile:   No results found for: CHOL  Lab Results   Component Value Date    HDL 49 (L) 07/20/2022     Lab Results   Component Value Date    LDLCALC 134 (H) 07/20/2022     Lab Results   Component Value Date    TRIG 57 07/20/2022       Cardiac testi reviewed her EKGs  They are normal  No results found for this or any previous visit  No results found for this or any previous visit  No results found for this or any previous visit  No results found for this or any previous visit  Sawyer Blanton MD    Portions of the record may have been created with voice recognition software   Occasional wrong word or "sound a like" substitutions may have occurred due to the inherent limitations of voice recognition software   Read the chart carefully and recognize, using context, where substitutions have occurred

## 2022-08-22 NOTE — TELEPHONE ENCOUNTER
patient scheduled for LHC at [facility] on 8/29/22 with Dr Omar Light  Patient aware of general instructions and labs required  Can we please have insurance check for service

## 2022-08-23 ENCOUNTER — APPOINTMENT (OUTPATIENT)
Dept: LAB | Facility: MEDICAL CENTER | Age: 32
End: 2022-08-23
Payer: COMMERCIAL

## 2022-08-23 LAB
ALBUMIN SERPL BCP-MCNC: 3.8 G/DL (ref 3.5–5)
ALP SERPL-CCNC: 61 U/L (ref 46–116)
ALT SERPL W P-5'-P-CCNC: 21 U/L (ref 12–78)
ANION GAP SERPL CALCULATED.3IONS-SCNC: 2 MMOL/L (ref 4–13)
AST SERPL W P-5'-P-CCNC: 17 U/L (ref 5–45)
BASOPHILS # BLD AUTO: 0.03 THOUSANDS/ΜL (ref 0–0.1)
BASOPHILS NFR BLD AUTO: 1 % (ref 0–1)
BILIRUB SERPL-MCNC: 0.33 MG/DL (ref 0.2–1)
BUN SERPL-MCNC: 14 MG/DL (ref 5–25)
CALCIUM SERPL-MCNC: 8.9 MG/DL (ref 8.3–10.1)
CHLORIDE SERPL-SCNC: 107 MMOL/L (ref 96–108)
CLINICAL INFO: NORMAL
CO2 SERPL-SCNC: 27 MMOL/L (ref 21–32)
CREAT SERPL-MCNC: 0.8 MG/DL (ref 0.6–1.3)
CYTO CVX: NORMAL
DATE PREVIOUS BX: NORMAL
EOSINOPHIL # BLD AUTO: 0.17 THOUSAND/ΜL (ref 0–0.61)
EOSINOPHIL NFR BLD AUTO: 4 % (ref 0–6)
ERYTHROCYTE [DISTWIDTH] IN BLOOD BY AUTOMATED COUNT: 13.5 % (ref 11.6–15.1)
GFR SERPL CREATININE-BSD FRML MDRD: 97 ML/MIN/1.73SQ M
GLUCOSE P FAST SERPL-MCNC: 85 MG/DL (ref 65–99)
HCT VFR BLD AUTO: 45.2 % (ref 34.8–46.1)
HGB BLD-MCNC: 15 G/DL (ref 11.5–15.4)
HPV E6+E7 MRNA CVX QL NAA+PROBE: NOT DETECTED
IMM GRANULOCYTES # BLD AUTO: 0.01 THOUSAND/UL (ref 0–0.2)
IMM GRANULOCYTES NFR BLD AUTO: 0 % (ref 0–2)
LMP START DATE: NORMAL
LYMPHOCYTES # BLD AUTO: 1.59 THOUSANDS/ΜL (ref 0.6–4.47)
LYMPHOCYTES NFR BLD AUTO: 34 % (ref 14–44)
MCH RBC QN AUTO: 29.7 PG (ref 26.8–34.3)
MCHC RBC AUTO-ENTMCNC: 33.2 G/DL (ref 31.4–37.4)
MCV RBC AUTO: 90 FL (ref 82–98)
MONOCYTES # BLD AUTO: 0.5 THOUSAND/ΜL (ref 0.17–1.22)
MONOCYTES NFR BLD AUTO: 11 % (ref 4–12)
NEUTROPHILS # BLD AUTO: 2.44 THOUSANDS/ΜL (ref 1.85–7.62)
NEUTS SEG NFR BLD AUTO: 50 % (ref 43–75)
NRBC BLD AUTO-RTO: 0 /100 WBCS
PATHOLOGIST CVX/VAG CYTO: NORMAL
PLATELET # BLD AUTO: 225 THOUSANDS/UL (ref 149–390)
PMV BLD AUTO: 8.9 FL (ref 8.9–12.7)
POTASSIUM SERPL-SCNC: 4.2 MMOL/L (ref 3.5–5.3)
PROT SERPL-MCNC: 7.3 G/DL (ref 6.4–8.4)
RBC # BLD AUTO: 5.05 MILLION/UL (ref 3.81–5.12)
SL AMB PREV. PAP:: NORMAL
SODIUM SERPL-SCNC: 136 MMOL/L (ref 135–147)
SPECIMEN SOURCE CVX/VAG CYTO: NORMAL
WBC # BLD AUTO: 4.74 THOUSAND/UL (ref 4.31–10.16)

## 2022-08-23 PROCEDURE — 36415 COLL VENOUS BLD VENIPUNCTURE: CPT

## 2022-08-23 PROCEDURE — 85025 COMPLETE CBC W/AUTO DIFF WBC: CPT

## 2022-08-23 PROCEDURE — 80053 COMPREHEN METABOLIC PANEL: CPT

## 2022-08-29 ENCOUNTER — HOSPITAL ENCOUNTER (OUTPATIENT)
Facility: HOSPITAL | Age: 32
Setting detail: OUTPATIENT SURGERY
Discharge: HOME | End: 2022-08-29
Attending: INTERNAL MEDICINE | Admitting: INTERNAL MEDICINE
Payer: COMMERCIAL

## 2022-08-29 VITALS
OXYGEN SATURATION: 98 % | TEMPERATURE: 98.1 F | HEIGHT: 65 IN | SYSTOLIC BLOOD PRESSURE: 99 MMHG | RESPIRATION RATE: 16 BRPM | HEART RATE: 73 BPM | DIASTOLIC BLOOD PRESSURE: 62 MMHG | BODY MASS INDEX: 27.77 KG/M2 | WEIGHT: 166.67 LBS

## 2022-08-29 DIAGNOSIS — R07.89 CHEST PRESSURE: ICD-10-CM

## 2022-08-29 LAB
ATRIAL RATE: 69 BPM
EXT PREGNANCY TEST URINE: NEGATIVE
EXT. CONTROL: NORMAL
P AXIS: 14 DEGREES
PR INTERVAL: 152 MS
QRS AXIS: 59 DEGREES
QRSD INTERVAL: 82 MS
QT INTERVAL: 410 MS
QTC INTERVAL: 439 MS
T WAVE AXIS: 43 DEGREES
VENTRICULAR RATE: 69 BPM

## 2022-08-29 PROCEDURE — 93010 ELECTROCARDIOGRAM REPORT: CPT | Performed by: INTERNAL MEDICINE

## 2022-08-29 PROCEDURE — C1769 GUIDE WIRE: HCPCS | Performed by: INTERNAL MEDICINE

## 2022-08-29 PROCEDURE — C1894 INTRO/SHEATH, NON-LASER: HCPCS | Performed by: INTERNAL MEDICINE

## 2022-08-29 PROCEDURE — 93005 ELECTROCARDIOGRAM TRACING: CPT

## 2022-08-29 PROCEDURE — 93454 CORONARY ARTERY ANGIO S&I: CPT | Performed by: INTERNAL MEDICINE

## 2022-08-29 PROCEDURE — C1887 CATHETER, GUIDING: HCPCS | Performed by: INTERNAL MEDICINE

## 2022-08-29 PROCEDURE — 99153 MOD SED SAME PHYS/QHP EA: CPT | Performed by: INTERNAL MEDICINE

## 2022-08-29 PROCEDURE — 99152 MOD SED SAME PHYS/QHP 5/>YRS: CPT | Performed by: INTERNAL MEDICINE

## 2022-08-29 PROCEDURE — NC001 PR NO CHARGE: Performed by: INTERNAL MEDICINE

## 2022-08-29 PROCEDURE — 81025 URINE PREGNANCY TEST: CPT | Performed by: NURSE PRACTITIONER

## 2022-08-29 RX ORDER — ACETAMINOPHEN 325 MG/1
650 TABLET ORAL EVERY 4 HOURS PRN
Status: DISCONTINUED | OUTPATIENT
Start: 2022-08-29 | End: 2022-08-29 | Stop reason: HOSPADM

## 2022-08-29 RX ORDER — FENTANYL CITRATE 50 UG/ML
INJECTION, SOLUTION INTRAMUSCULAR; INTRAVENOUS AS NEEDED
Status: DISCONTINUED | OUTPATIENT
Start: 2022-08-29 | End: 2022-08-29 | Stop reason: HOSPADM

## 2022-08-29 RX ORDER — ASPIRIN 81 MG/1
324 TABLET, CHEWABLE ORAL ONCE
Status: COMPLETED | OUTPATIENT
Start: 2022-08-29 | End: 2022-08-29

## 2022-08-29 RX ORDER — SODIUM CHLORIDE 9 MG/ML
200 INJECTION, SOLUTION INTRAVENOUS CONTINUOUS
Status: DISCONTINUED | OUTPATIENT
Start: 2022-08-29 | End: 2022-08-29 | Stop reason: HOSPADM

## 2022-08-29 RX ORDER — LIDOCAINE HYDROCHLORIDE 10 MG/ML
INJECTION, SOLUTION EPIDURAL; INFILTRATION; INTRACAUDAL; PERINEURAL AS NEEDED
Status: DISCONTINUED | OUTPATIENT
Start: 2022-08-29 | End: 2022-08-29 | Stop reason: HOSPADM

## 2022-08-29 RX ORDER — NITROGLYCERIN 20 MG/100ML
INJECTION INTRAVENOUS AS NEEDED
Status: DISCONTINUED | OUTPATIENT
Start: 2022-08-29 | End: 2022-08-29 | Stop reason: HOSPADM

## 2022-08-29 RX ORDER — MIDAZOLAM HYDROCHLORIDE 2 MG/2ML
INJECTION, SOLUTION INTRAMUSCULAR; INTRAVENOUS AS NEEDED
Status: DISCONTINUED | OUTPATIENT
Start: 2022-08-29 | End: 2022-08-29 | Stop reason: HOSPADM

## 2022-08-29 RX ORDER — SODIUM CHLORIDE 9 MG/ML
125 INJECTION, SOLUTION INTRAVENOUS CONTINUOUS
Status: DISCONTINUED | OUTPATIENT
Start: 2022-08-29 | End: 2022-08-29 | Stop reason: HOSPADM

## 2022-08-29 RX ORDER — HEPARIN SODIUM 1000 [USP'U]/ML
INJECTION, SOLUTION INTRAVENOUS; SUBCUTANEOUS AS NEEDED
Status: DISCONTINUED | OUTPATIENT
Start: 2022-08-29 | End: 2022-08-29 | Stop reason: HOSPADM

## 2022-08-29 RX ORDER — ONDANSETRON 2 MG/ML
4 INJECTION INTRAMUSCULAR; INTRAVENOUS EVERY 6 HOURS PRN
Status: DISCONTINUED | OUTPATIENT
Start: 2022-08-29 | End: 2022-08-29 | Stop reason: HOSPADM

## 2022-08-29 RX ORDER — VERAPAMIL HCL 2.5 MG/ML
AMPUL (ML) INTRAVENOUS AS NEEDED
Status: DISCONTINUED | OUTPATIENT
Start: 2022-08-29 | End: 2022-08-29 | Stop reason: HOSPADM

## 2022-08-29 RX ADMIN — SODIUM CHLORIDE 125 ML/HR: 0.9 INJECTION, SOLUTION INTRAVENOUS at 08:11

## 2022-08-29 RX ADMIN — ASPIRIN 81 MG CHEWABLE TABLET 324 MG: 81 TABLET CHEWABLE at 07:47

## 2022-08-29 NOTE — INTERVAL H&P NOTE
Update: (This section must be completed if the H&P was completed greater than 24 hrs to procedure or admission)    H&P reviewed  After examining the patient, I find no changed to the H&P since it had been written  Patient re-evaluated   Accept as history and physical     General: alert awake and oriented, no acute distress  Heart: regular rate, regular rhythm, S1, S2   Respiratory effort/ Lungs: breathing comfortably on room air, clear bilaterally without wheezing, rales, crackles   Abdominal: non-tender to palpation, + bowel sounds, soft, no masses or distension  Lower Limbs: no overt bilateral lower extremity edema  Bilateral radial pulses: + 2 bilaterally     Ht 5' 5" (1 651 m)   Wt 75 6 kg (166 lb 10 7 oz)   LMP 08/02/2022 (Exact Date)   BMI 27 73 kg/m²      CINDY Castano/August 29, 2022/8:34 AM

## 2022-08-29 NOTE — Clinical Note
Received to [facility] procedure room for elective study  Aware of planned procedure offering no questions or complaints this am   Endorses anxiety, reassured  Spoke with Morris Sun MD and informed consent obtained

## 2022-08-29 NOTE — PROGRESS NOTES
Transferred by stretcher with nurse to [facility]-  Received by Cherelle Gardner and care assumed  OP site assessed and remains stable  Safety addressed before leaving bedside

## 2022-08-29 NOTE — DISCHARGE INSTRUCTIONS
SITE CARE  1  Please see the post cardiac catheterization dishcarge instructions  No lifting greater than 10 lbs  or strenuous activity for 48 hrs  2  Remove band aid tomorrow  Shower and wash area (wrist) gently with soap and water- beginning tomorrow  Rinse and pat dry  Apply new water seal band aid  Repeat this process for 5 days  No powders, creams lotions or antibiotic ointments for 5 days  No tub baths, hot tubs/sauna or swimming for 5 days  3  Please call our office ([phone]) if you have any fever, redness, swelling, discharge from your wrist/access site  4  No driving for 1 day       -----------------------------------------------------------------------------------------------------------------------------------------------------------------------------  PROCEDURE INFORMATION    LEFT HEART CATHETERIZATION    WHAT YOU NEED TO KNOW:   A left heart catheterization is a procedure to look at your heart and its arteries  You may need this procedure if you have chest pain, heart disease, or your heart is not working as it should  DISCHARGE INSTRUCTIONS:   Follow up with your healthcare provider as directed:  Write down your questions so you remember to ask them during your visits  Limit activity as directed:   Avoid unnecessary stair climbing for 48 hours, if a catheter was put in your groin  Do not place pressure on your arm, hand, or wrist, if the catheter was placed in your wrist  Avoid pushing, pulling, or heavy lifting with that arm  If you need to cough, support the area where the catheter was inserted with your hand  Ask your healthcare provider how long you need to limit movement and avoid certain activities  You may feel like resting more after your procedure  Slowly start to do more each day  Rest when you feel it is needed  Drink liquids as directed:  Liquids help flush the dye used for your procedure out of your body   Ask your healthcare provider how much liquid to drink each day, and which liquids to drink  Some foods, such as soup and fruit, also provide liquid  Wound care:  Ask your healthcare provider about how to care for your incision wound  Ask when you can get into a tub, shower, or pool  Contact your healthcare provider if:   You have a fever  The skin around your wound is red, swollen, or has pus coming from it  You have trouble breathing, or your skin is itchy, swollen, or has a rash  You have questions or concerns about your condition or care  Seek care immediately or call 911 if: The area where the catheter was placed is swollen and filled with blood or is bleeding  The leg or arm used for the procedure becomes numb or turns white or blue  You feel lightheaded, short of breath, and have chest pain  You cough up blood  You have any of the following signs of a heart attack:      Squeezing, pressure, or pain in your chest that lasts longer than 5 minutes or returns    Discomfort or pain in your back, neck, jaw, stomach, or arm     Trouble breathing    Nausea or vomiting    Lightheadedness or a sudden cold sweat, especially with chest pain or trouble breathing    Your arm or leg feels warm, tender, and painful  It may look swollen and red  You have any of the following signs of a stroke:     Part of your face droops or is numb    Weakness in an arm or leg    Confusion or difficulty speaking    Dizziness, a severe headache, or vision loss  © 2017 [facility] Information is for End User's use only and may not be sold, redistributed or otherwise used for commercial purposes  All illustrations and images included in CareNotes® are the copyrighted property of A D A M , Inc  or Dayron Santana  The above information is an  only  It is not intended as medical advice for individual conditions or treatments   Talk to your doctor, nurse or pharmacist before following any medical regimen to see if it is safe and effective for you

## 2022-08-29 NOTE — Clinical Note
Post procedure Laura id awake communicating freely speech clear without complaints  Spoke with Mayo Bose MD and findings discussed, Questions answered    Readied for transfer

## 2022-08-29 NOTE — Clinical Note
Medication given was fentanyl  The indication was Pain  Patient:   Elisha Jordan            MRN: [id number]            FIN: [id number]              Age:   70 years     Sex:  MALE     :  48   Associated Diagnoses:   None   Author:   Hawk YOUNG     Subjective:  Patient feeling well.  No CP, SOB at rest, abd pain, n/v.  Dressing change ongoing  Objective:  I & O between:  02-SEP-2019 10:09 TO 03-SEP-2019 10:09  Med Dosing Weight:  83.3  kg   22-AUG-2019  24 Hour Intake:   390.00  ( 4.68 mL/kg )  24 Hour Output:   1525.00           24 Hour Urine/Stool Output:   0.0  24 Hour Balance:   -1135.00           24 Hour Urine Output:   1525.00  ( 0.76 mL/kg/hr )                    Stool Count:  1     Vitals between:   02-SEP-2019 10:09:26   TO   03-SEP-2019 10:09:26                   LAST RESULT MINIMUM MAXIMUM  Temperature 36.9 36.1 36.9  Heart Rate 95 93 103  Respiratory Rate 16 16 18  NISBP           98 98 98  NIDBP           78 78 78  NIMBP           85 85 85  SpO2                    99 97 99  Medications (33) Active  Scheduled: (19)  *Magnesium Protocol Communication  1 each, N/A, Daily  *Potassium Protocol Communication  1 each, N/A, Daily  AmLODIPine 5 mg tab  5 mg 1 tab, Oral, Q12H  Aspirin 81 mg DR tab  81 mg 1 tab, Oral, Daily  Chlorhexidine gluconate 2% topical CLOTH 2s  6 pad, Topical, Daily  Colchicine 0.6 mg tab  0.6 mg 1 tab, Oral, Daily  Docusate sodium 100 mg cap  100 mg 1 cap, Oral, BID  Famotidine 20 mg tab  20 mg 1 tab, Oral, Q Bedtime  HydrALAZINE 50 mg tab  100 mg 2 tab, Oral, Q8H  Insulin human lispro 1 unit/0.01 mL inj  2-12 units, Subcutaneous, QID [with meals & HS]  Isosorbide dinitrate 10 mg tab  10 mg 1 tab, Oral, Q8H  Linagliptin 5 mg tab  5 mg 1 tab, Oral, Daily  Multivitamin with minerals chew tab  1 tab, Chewed, Daily  Polyethylene glycol 3350 oral recon powder 17 gm packet UD  17 gm 1 packet, Oral, Daily  Potassium CHLORIDE 20 mEq ER tab  20 mEq 1 tab, Oral, BID  Senna 8.6 mg tab  8.6 mg 1 tab, Oral, Daily  Sodium chloride PF 0.9% flush inj 10 mL  10 mL, Flush, Q12H  Torsemide 20 mg tab  40 mg 2 tab, Oral, Daily  warfarin  4 mg, Oral, Tonight (Once)  Continuous: (0)  PRN: (14)  Acetaminophen 325 mg tab  650 mg 2 tab, Oral, Q6H  Acetaminophen 650 mg suppos  650 mg 1 suppository, Rectal, Q6H  Bisacodyl 10 mg suppos  10 mg 1 suppository, Rectal, As Directed PRN  Dextrose (glucose) 40% 15 gm/37.5 gm oral gel UD  15 gm, Oral, As Directed PRN  Dextrose (glucose) 50% 25 gm/50 mL syringe  12.5 gm 25 mL, IV Push, As Directed PRN  Fleet adult 7-19 gm enema 133 mL  133 mL, Rectal, As Directed PRN  Glucagon 1 mg/1 mL emergency kit SDV  1 mg 1 mL, IM, As Directed PRN  Hydrocodone-acetaminophen  mg tab  1 tab, Oral, Q4H  Hydrocodone-acetaminophen 5-325 mg tab  1 tab, Oral, Q4H  Milk of magnesia 8% 30 mL oral susp UD  30 mL, Oral, QID  Ondansetron 4 mg/2 mL inj SDV  4 mg 2 mL, Slow IV Push, Q6H  Sodium chloride PF 0.9% flush inj 10 mL  10 mL, Flush, As Directed PRN  Sodium chloride PF 0.9% flush inj 10 mL  20 mL, Flush, As Directed PRN  TraMADol 50 mg tab  50 mg 1 tab, Oral, Q4H  GENERAL: _ in no apparent distress, AOx3  HEENT: NCAT, EOMI  CHEST:  CTA anteriorly, equal chest rise, on HFNC  CARDIAC:  +VAD hum  ABDOMEN:  Soft, without detectable tenderness. No sign of distention. .   Bowel Sounds normal._  MUSCULOSKELETAL:  Good range of motion of all major joints. warm. No edema  PSYCH: Normal moood and affect.   Labs:  Labs between:  02-SEP-2019 10:09 to 03-SEP-2019 10:09  CBC:                 WBC  HgB  Hct  Plt  MCV  RDW   03-SEP-2019 6.3  (L) 8.6  (L) 27.3  366  85.0  13.2   DIFF:                 Seg  Neutroph//ABS  Lymph//ABS  Mono//ABS  EOS/ABS  [id number] NOT APPLICABLE  64 // 4.0 17 // 1.0 9 // 0.6 7 // 0.4  BMP:                 Na  Cl  BUN  Glu   03-SEP-2019 140  104  (H) 23  93                              K  CO2  Cr  Ca                              4.0  25  (H) 1.75  8.7   Other Chem:             Mg  Phos  Triglycerides  GGTP DirectBili                           1.8           POC GLU:                 Latest Result  Latest Date  Minimum  Min Date  Maximum  Max Date                             (H) 113  03-SEP-2019 (H) 113  03-SEP-2019 (H) 160  02-SEP-2019  COAG:                 INR  PT  PTT  Ddimer  Fibrinogen    03-SEP-2019 2.2  (H) 21.9                        Assessment and plan:   Mr Yulia Ibarra is a 70 M with CAD s/p CABG , HFrEF in cardiac rehab, NIDDM, PMR (on only 1mg q48H), gout who says was able to do a mile in treadmill in 20 minutes 1-2  month ago but for the last month had decrease fx capacity with ROGERS with mild activity within his home. He was seen at [facility] and at his EF dropped from 22 to 10 with ischemic liver and NEERU was transferred for management by advance heart failure team.  Acute hypoxemic respiratory failure    Currently on high flow nasal cannula. Extubated 8/8. Wean FiO2 as tolerated. -CT Chest on 8/15 w/ b/l pleural effusions w/ associated atelectasis  -CXR on 8/18 w/ worsening L pleural effusion. Chest US w/ large left pleural effusion  - s/p chest tube placement on left on 8/21, chest tube out 8/28  - Weaning oxygenation. Now satting well on RA  - mgmt per TS and lvad team   Encourage incentive spirometry    Cardiogenic shock, acute on chronic systolic HF-->S/p Heartmate 3 LVAD as DT 8/5.  - EF down to 10% from 25%   - RHC completed on 7/25 - Severely reduced cardiac output,  Moderately elevated RA pressures, Severely elevated RV, PAWP;  Severely elevated pulmonary arterial pressure; Reduced right ventricular function  -Echo 8/10 report: EF 25-30%. There is a septal diastolic/systolic flattening. Leaflets open with each beat. RV systolic function reduced w/ peak pressure 48mm Hg. Mild TR.  Small/moderate pericardial effusion.  -Mil off   - AC: on coumadin, INR theraputic   - on hydralazine/isordil, amlodipine  - on torsemide  - needs teaching and excursion  - per vad team  Acute Gout: improved   - cont colchicine HTN   - BP meds as above   Thrombocytopenia    likely 2/2 iabp,  improved   - per heme, -The timing suggests consumptive process due to IABP, acute liver injury, cardiogenic shock. Patient had false + PF4, his ERASMO was negative and clinical picture was not c/w HIT  -Resolved   NEERU on CKDIII, Cr baseline ~1.8; hyponatremia   - likely cardiorenal   - on 8/7 - Cr uptrending, up to 3  - mgmt as above   - monitor renal function closely   Nephrology following, appreciate recs      Hyponatremia, resolved   Fevers, leukocytosis - resolved   -last on 8/3  ? etiology may be 2/2 gout flare , blood culture sent NGTD  -cefepime and vanco stopped  8/2   -Leukocytosis improving, stable, monitor off antibiotics per infectious disease      Acute blood loss anemia    - hgb slowly downtrending, transfuse to keep hgb >8, defer to AHF team      Transaminitis   -LFT's improving   - likely ischemic 2/2 CHF and poor flow w/ cardiogenic shock   - hepatic duplex w/ no clot   - stopped lipitor and allopurinol due to LFTs   - monitor   - Imaging with no acute abnormalities        Pulm HTN - likely 2/2 left sided failure, defer to AHF team   - ddimer positive at OSH, VQ scan low prob for PE       CAD s/p cabg   - ASA. Not on BB as was on inotropes. statin stopped w/ elevated LFTs.  If LFTs stable as outpt, consider starting statin     Constipation - improved w/ bowel regimen   DM - on linagliptin  - had hypoglycemic episode on 9/3, will hold linagliptin and monitor  - Endo following        Deconditioning :  PT/OT, appreciate recs     dvt ppx - per vad team   FULL CODE

## 2022-09-09 ENCOUNTER — OFFICE VISIT (OUTPATIENT)
Dept: FAMILY MEDICINE CLINIC | Facility: CLINIC | Age: 32
End: 2022-09-09
Payer: COMMERCIAL

## 2022-09-09 VITALS
DIASTOLIC BLOOD PRESSURE: 76 MMHG | RESPIRATION RATE: 18 BRPM | BODY MASS INDEX: 27.82 KG/M2 | WEIGHT: 167 LBS | HEIGHT: 65 IN | TEMPERATURE: 98.6 F | HEART RATE: 97 BPM | OXYGEN SATURATION: 97 % | SYSTOLIC BLOOD PRESSURE: 108 MMHG

## 2022-09-09 DIAGNOSIS — R06.02 SOB (SHORTNESS OF BREATH) ON EXERTION: Primary | ICD-10-CM

## 2022-09-09 PROCEDURE — 99214 OFFICE O/P EST MOD 30 MIN: CPT | Performed by: FAMILY MEDICINE

## 2022-09-09 NOTE — PROGRESS NOTES
Assessment/Plan:     1  SOB (shortness of breath) on exertion  Assessment & Plan:  Cardiac evaluation unremarkable; labs and CXR also unremarkable; will check PFTs and CT; referred to pulmonary for further evaluation and recommendations; discussed possible pulmonary rehab in future pending results    Orders:  -     Complete PFT with post bronchodilator; Future  -     CT chest wo contrast; Future; Expected date: 09/09/2022  -     Ambulatory Referral to Pulmonology; Future        Subjective:      Patient ID: Imelda Howe is a 28 y o  female  Patient is here for follow up on SOB  Had evaluation by cardiology that was negative for cardiac origin of symptoms  Cardiac catheterization was normal  Still having SOB with exertion  She states she can walk about 0 5 mile before feeling lightheaded  Started feeling symptoms around 0 25 miles  She has difficulty with doing jumping jacks or uphill walking  No coughing or wheezing  No hx of asthma or previous lung disease  Had recent CBC that was normal  Other labs thus far have been unremarkable  CXR was normal  Symptoms seemed to have started shortly after having a lower respiratory infection  She had testing for COVID19 during illness and was negative       Lab Results       Component                Value               Date                       WBC                      4 74                08/23/2022                 HGB                      15 0                08/23/2022                 HCT                      45 2                08/23/2022                 MCV                      90                  08/23/2022                 PLT                      225                 08/23/2022                  The following portions of the patient's history were reviewed and updated as appropriate: allergies, current medications, past family history, past medical history, past social history, past surgical history, and problem list     Review of Systems   Constitutional: Positive for fatigue  Negative for chills and fever  Respiratory: Positive for shortness of breath  Cardiovascular: Negative for chest pain and palpitations  Objective:      /76 (BP Location: Right arm, Patient Position: Sitting, Cuff Size: Adult)   Pulse 97   Temp 98 6 °F (37 °C) (Temporal)   Resp 18   Ht 5' 5" (1 651 m)   Wt 75 8 kg (167 lb)   LMP 09/01/2022 (Exact Date)   SpO2 97%   BMI 27 79 kg/m²          Physical Exam  Vitals reviewed  Constitutional:       General: She is not in acute distress  Appearance: Normal appearance  She is not ill-appearing, toxic-appearing or diaphoretic  HENT:      Head: Normocephalic and atraumatic  Eyes:      General: No scleral icterus  Right eye: No discharge  Left eye: No discharge  Conjunctiva/sclera: Conjunctivae normal    Cardiovascular:      Rate and Rhythm: Normal rate and regular rhythm  Pulses: Normal pulses  Heart sounds: Normal heart sounds  No murmur heard  No gallop  Pulmonary:      Effort: Pulmonary effort is normal  No respiratory distress  Breath sounds: Normal breath sounds  No stridor  No wheezing, rhonchi or rales  Musculoskeletal:      Right lower leg: No edema  Left lower leg: No edema  Neurological:      General: No focal deficit present  Mental Status: She is alert and oriented to person, place, and time  Psychiatric:         Mood and Affect: Mood normal          Behavior: Behavior normal          Thought Content:  Thought content normal          Judgment: Judgment normal

## 2022-09-09 NOTE — PATIENT INSTRUCTIONS
Complete CT scan and pulmonary function test  Follow up with pulmonologist  We will notify you of results

## 2022-09-09 NOTE — ASSESSMENT & PLAN NOTE
Cardiac evaluation unremarkable; labs and CXR also unremarkable; will check PFTs and CT; referred to pulmonary for further evaluation and recommendations; discussed possible pulmonary rehab in future pending results

## 2022-09-12 ENCOUNTER — TELEPHONE (OUTPATIENT)
Dept: PULMONOLOGY | Facility: CLINIC | Age: 32
End: 2022-09-12

## 2022-09-12 NOTE — TELEPHONE ENCOUNTER
Lvm for pt to schedule a consult appt @ our [facility] [address] [place] [address] office per referral placed by PCP  Please offer pt appt today 09/12 with Dr Gudelia Gilmore at our [facility] [address] [place] [address] office @ 3PM if the pt is interested

## 2022-09-17 ENCOUNTER — TELEMEDICINE (OUTPATIENT)
Dept: FAMILY MEDICINE CLINIC | Facility: CLINIC | Age: 32
End: 2022-09-17
Payer: COMMERCIAL

## 2022-09-17 DIAGNOSIS — U07.1 COVID-19: Primary | ICD-10-CM

## 2022-09-17 PROCEDURE — 99214 OFFICE O/P EST MOD 30 MIN: CPT | Performed by: FAMILY MEDICINE

## 2022-09-17 RX ORDER — PREDNISONE 10 MG/1
TABLET ORAL
Qty: 21 TABLET | Refills: 0 | Status: SHIPPED | OUTPATIENT
Start: 2022-09-17 | End: 2022-09-26

## 2022-09-17 NOTE — PROGRESS NOTES
COVID-19 Outpatient Progress Note    Assessment/Plan:    Problem List Items Addressed This Visit    None     Visit Diagnoses     COVID-19    -  Primary    Relevant Medications    predniSONE 10 mg tablet         Disposition:     Patient has asymptomatic or mild COVID-19 infection  Based off CDC guidelines, they were recommended to isolate for 5 days  If they are asymptomatic or symptoms are improving with no fevers in the past 24 hours, isolation may be ended followed by 5 days of wearing a mask when around othes to minimize risk of infecting others  If still have a fever or other symptoms have not improved, continue to isolate until they improve  Regardless of when they end isolation, avoid being around people who are more likely to get very sick from COVID-19 until at least day 11  Discussed symptom directed medication options with patient  Discussed vitamin D, vitamin C, and/or zinc supplementation with patient  I have spent 12 minutes directly with the patient  Greater than 50% of this time was spent in counseling/coordination of care regarding: diagnostic results, prognosis, risks and benefits of treatment options, instructions for management and patient and family education  Reviewed patient's symptoms  At this time, symptoms have been persistent per for the past 7 days  She was advised that she does not qualify for antiviral treatment  She does have albuterol home and was advised to use this every 6 hours as needed  Will start treatment with a prednisone taper  Follow up if any symptoms are persisting  If her symptoms are persisting with no improvement by Wednesday, will consider oral antibiotics  Encounter provider: Radhika Melton DO     Provider located at: [address] [facility] [address]  [address] [facility] [address]  [address] [place] [address]  [phone]     Recent Visits  No visits were found meeting these conditions    Showing recent visits within past 7 days and meeting all other requirements  Today's Visits  Date Type Provider Dept   09/17/22 Telemedicine Zaire Navas DO [facility]   Showing today's visits and meeting all other requirements  Future Appointments  No visits were found meeting these conditions  Showing future appointments within next 150 days and meeting all other requirements     This virtual check-in was done via [facility] and patient was informed that this is a secure, HIPAA-compliant platform  She agrees to proceed  Patient agrees to participate in a virtual check in via telephone or video visit instead of presenting to the office to address urgent/immediate medical needs  Patient is aware this is a billable service  She acknowledged consent and understanding of privacy and security of the video platform  The patient has agreed to participate and understands they can discontinue the visit at any time  After connecting through [facility], the patient was identified by name and date of birth  Yasir Garner was informed that this was a telemedicine visit and that the exam was being conducted confidentially over secure lines  My office door was closed  No one else was in the room  Yasir Garner acknowledged consent and understanding of privacy and security of the telemedicine visit  I informed the patient that I have reviewed her record in Epic and presented the opportunity for her to ask any questions regarding the visit today  The patient agreed to participate  Verification of patient location:  Patient is located in the following state in which I hold an active license: [place]    Subjective:   Yasir Garner is a 28 y o  female who has been screened for COVID-19  Symptom change since last report: unchanged  Patient's symptoms include fatigue, malaise, nasal congestion, rhinorrhea, cough and chest tightness  Patient denies fever, chills, sore throat, shortness of breath, abdominal pain, nausea, diarrhea, myalgias and headaches       - Date of symptom onset: 9/10/2022  - Date of positive COVID-19 test: 9/11/2022  Type of test: Home antigen  COVID-19 vaccination status: Fully vaccinated with booster    Vicenta Metz has been staying home and has isolated themselves in her home  She is taking care to not share personal items and is cleaning all surfaces that are touched often, like counters, tabletops, and doorknobs using household cleaning sprays or wipes  She is wearing a mask when she leaves her room  Lab Results   Component Value Date    SARSCOV2 Negative 07/20/2022       Review of Systems   Constitutional: Positive for fatigue  Negative for activity change, chills and fever  HENT: Positive for congestion and rhinorrhea  Negative for ear pain, sinus pressure and sore throat  Eyes: Negative for redness, itching and visual disturbance  Respiratory: Positive for cough and chest tightness  Negative for shortness of breath  Cardiovascular: Negative for chest pain and palpitations  Gastrointestinal: Negative for abdominal pain, diarrhea and nausea  Endocrine: Negative for cold intolerance and heat intolerance  Genitourinary: Negative for dysuria, flank pain and frequency  Musculoskeletal: Negative for arthralgias, back pain, gait problem and myalgias  Skin: Negative for color change  Allergic/Immunologic: Negative for environmental allergies  Neurological: Negative for dizziness, numbness and headaches  Psychiatric/Behavioral: Negative for behavioral problems and sleep disturbance  Current Outpatient Medications on File Prior to Visit   Medication Sig    ARIPiprazole (ABILIFY) 5 mg tablet Take 1 tablet (5 mg total) by mouth daily at bedtime    citalopram (CeleXA) 20 mg tablet take 1 and 1/2 tablets by mouth daily    hydrOXYzine HCL (ATARAX) 25 mg tablet     Multiple Vitamin (MULTIVITAMIN) tablet Take 1 tablet by mouth daily      prazosin (MINIPRESS) 5 mg capsule Take 10 mg by mouth       Objective:    LMP 09/01/2022 (Exact Date) Physical Exam  Constitutional:       General: She is not in acute distress  Appearance: She is well-developed  She is not ill-appearing or toxic-appearing  HENT:      Head: Normocephalic and atraumatic  Right Ear: Hearing normal       Left Ear: Hearing normal       Nose: Nose normal    Eyes:      General: Lids are normal       Conjunctiva/sclera: Conjunctivae normal    Pulmonary:      Effort: Pulmonary effort is normal  No respiratory distress  Musculoskeletal:         General: Normal range of motion  Cervical back: Normal range of motion  Skin:     General: Skin is dry  Findings: No erythema or rash  Neurological:      Mental Status: She is alert and oriented to person, place, and time  GCS: GCS eye subscore is 4  GCS verbal subscore is 5  GCS motor subscore is 6  Psychiatric:         Behavior: Behavior normal          Thought Content:  Thought content normal          Judgment: Judgment normal        Ihab Abdelaal, DO

## 2022-09-26 ENCOUNTER — OFFICE VISIT (OUTPATIENT)
Dept: CARDIOLOGY CLINIC | Facility: CLINIC | Age: 32
End: 2022-09-26
Payer: COMMERCIAL

## 2022-09-26 VITALS
HEIGHT: 65 IN | DIASTOLIC BLOOD PRESSURE: 76 MMHG | BODY MASS INDEX: 27.63 KG/M2 | WEIGHT: 165.8 LBS | SYSTOLIC BLOOD PRESSURE: 108 MMHG | HEART RATE: 92 BPM | OXYGEN SATURATION: 98 %

## 2022-09-26 DIAGNOSIS — R06.02 SOB (SHORTNESS OF BREATH) ON EXERTION: Primary | ICD-10-CM

## 2022-09-26 PROCEDURE — 99213 OFFICE O/P EST LOW 20 MIN: CPT | Performed by: INTERNAL MEDICINE

## 2022-09-26 NOTE — PROGRESS NOTES
Follow-up - Cardiology   Orin Romo 28 y o  female MRN: [id number]        Problems    Problem List Items Addressed This Visit        Other    SOB (shortness of breath) on exertion - Primary            Plan and discussion  Patient seen briefly on September 26, 2022  At the last visit she had described chest pressure  She does have a history of anxiety  She has a very positive family history of coronary disease  He did have a cardiac catheterization is totally normal     We had a discussion in which I basically reassured her that this chest pain is not cardiac in origin  She is already less symptomatic now that she knows her coronaries are normal     We will see her on a p r n  basis          HPI: Orin Romo is a 28y o  year old female         Reason for Consult / Principal Problem:  Chest pain and shortness of breath with exertion  HPI: Orin Romo is a 28y o  year old female         Patient is a 26-year-old female coming in today with chest pain, palpitations   Patient states that she has a history of anxiety depression otherwise healthy   She reports that over the past 2 weeks she has been having increasing fatigue   She has worsening of this over the past 2 weeks  Lala Jacobs has no fevers, chills, nausea, vomiting, diarrhea   No abnormal vaginal heavy bleeding spotting or discharge  Lala Jacobs has no recent travel, sick contacts recent antibiotic use   No abdominal pain   She has had no headache, tinnitus or syncope   She states over the past 4 5 days she has been having worsening shortness of breath and chest heaviness   She went to her PCP day  Lala Jacobs reports that they did lab work in syndrome   When she walked up the stairs at her house she checked her pulse and was in the 120s and O2 sats was 87   She describes as a pressure heaviness in her chest intermittently with feeling of palpitations   This does not radiate into the neck, jaw, back or upper extremities   She denies any paresthesias throughout the bilateral upper extremities  She has no syncope or lower extremity edema   She is not on birth control   No family history of clotting disorders   Strong family history of early coronary disease   Patient did not take anything for this      This young woman is brought is significantly limited  His a clear-cut repetitive history of limitation when doing any type of exertion such as walking up a hill or going up steps  She is short of breath palpitations and chest pressure  She recently had a stress echo  She exercised for only 3 minutes developing a maximal heart rate and almost had syncope       She also developed chest pressure  The stress echo itself showed no segmental abnormalities suggest ischemia her left ventricular function is normal     The echo part did not suggested any restrictive myocardial disease  She is very positive family history of early death in the father side arm  He had early heart attacks and sudden death  Apparently her brother of 20 years is told he has a heart condition but she does not know what type  Her LDL is 134 and HDL 49  Her thyroid studies are normal     I think we have to rule out epicardial coronary disease  I do not as yet started on a calcium antagonist     In addition may end up also ordering a MRI  Of note with her very limited stress test she also had a hypertensive response  She felt like fainting but she had a normal blood pressure, in fact a exaggerated blood pressure             Review of Systems      Past Medical History:   Diagnosis Date    Anxiety     Depression     Pilonidal cyst with abscess     Seasonal allergies     Varicella     Wears glasses      Social History     Substance and Sexual Activity   Alcohol Use No     Social History     Substance and Sexual Activity   Drug Use No     Social History     Tobacco Use   Smoking Status Never Smoker   Smokeless Tobacco Never Used       Allergies:   Allergies   Allergen Reactions    Gluten Meal - Food Allergy        Medications:     Current Outpatient Medications:     ARIPiprazole (ABILIFY) 5 mg tablet, Take 1 tablet (5 mg total) by mouth daily at bedtime, Disp: 30 tablet, Rfl: 1    citalopram (CeleXA) 20 mg tablet, take 1 and 1/2 tablets by mouth daily, Disp: 45 tablet, Rfl: 0    hydrOXYzine HCL (ATARAX) 25 mg tablet, , Disp: , Rfl:     Multiple Vitamin (MULTIVITAMIN) tablet, Take 1 tablet by mouth daily  , Disp: , Rfl:     prazosin (MINIPRESS) 5 mg capsule, Take 10 mg by mouth, Disp: , Rfl:       Physical Exam      Laboratory Studies:  CMP:      Invalid input(s): ALBUMIN  NT-proBNP:   Lab Results   Component Value Date    NTBNP 31 07/20/2022      Coags:    Lipid Profile:   No results found for: CHOL  Lab Results   Component Value Date    HDL 49 (L) 07/20/2022     Lab Results   Component Value Date    LDLCALC 134 (H) 07/20/2022     Lab Results   Component Value Date    TRIG 57 07/20/2022       Cardiac testing:     EKG reviewed personally:     No results found for this or any previous visit  No results found for this or any previous visit  No results found for this or any previous visit  No results found for this or any previous visit  Joaquin Leslie MD    Portions of the record may have been created with voice recognition software   Occasional wrong word or "sound a like" substitutions may have occurred due to the inherent limitations of voice recognition software   Read the chart carefully and recognize, using context, where substitutions have occurred

## 2022-11-09 ENCOUNTER — HOSPITAL ENCOUNTER (OUTPATIENT)
Dept: PULMONOLOGY | Facility: HOSPITAL | Age: 32
Discharge: HOME | End: 2022-11-09

## 2022-11-09 ENCOUNTER — APPOINTMENT (OUTPATIENT)
Dept: CT IMAGING | Facility: HOSPITAL | Age: 32
End: 2022-11-09

## 2022-11-09 DIAGNOSIS — R06.02 SOB (SHORTNESS OF BREATH) ON EXERTION: ICD-10-CM

## 2022-11-09 RX ORDER — ALBUTEROL SULFATE 2.5 MG/3ML
2.5 SOLUTION RESPIRATORY (INHALATION) ONCE AS NEEDED
Status: COMPLETED | OUTPATIENT
Start: 2022-11-09 | End: 2022-11-09

## 2022-11-09 RX ADMIN — ALBUTEROL SULFATE 2.5 MG: 2.5 SOLUTION RESPIRATORY (INHALATION) at 08:30

## 2022-12-03 ENCOUNTER — HOSPITAL ENCOUNTER (EMERGENCY)
Facility: HOSPITAL | Age: 32
Discharge: HOME | End: 2022-12-03
Attending: EMERGENCY MEDICINE

## 2022-12-03 ENCOUNTER — APPOINTMENT (EMERGENCY)
Dept: RADIOLOGY | Facility: HOSPITAL | Age: 32
End: 2022-12-03

## 2022-12-03 VITALS
TEMPERATURE: 99.1 F | HEART RATE: 89 BPM | SYSTOLIC BLOOD PRESSURE: 122 MMHG | DIASTOLIC BLOOD PRESSURE: 69 MMHG | BODY MASS INDEX: 26.89 KG/M2 | WEIGHT: 161.6 LBS | OXYGEN SATURATION: 99 % | RESPIRATION RATE: 18 BRPM

## 2022-12-03 DIAGNOSIS — R07.9 CHEST PAIN: Primary | ICD-10-CM

## 2022-12-03 DIAGNOSIS — J45.909 ASTHMA: ICD-10-CM

## 2022-12-03 LAB
ANION GAP SERPL CALCULATED.3IONS-SCNC: 9 MMOL/L (ref 4–13)
ATRIAL RATE: 81 BPM
BASOPHILS # BLD AUTO: 0.04 THOUSANDS/ÂΜL (ref 0–0.1)
BASOPHILS NFR BLD AUTO: 1 % (ref 0–1)
BUN SERPL-MCNC: 9 MG/DL (ref 5–25)
CALCIUM SERPL-MCNC: 8.7 MG/DL (ref 8.3–10.1)
CARDIAC TROPONIN I PNL SERPL HS: <2 NG/L
CHLORIDE SERPL-SCNC: 104 MMOL/L (ref 96–108)
CO2 SERPL-SCNC: 27 MMOL/L (ref 21–32)
CREAT SERPL-MCNC: 0.81 MG/DL (ref 0.6–1.3)
D DIMER PPP FEU-MCNC: 0.42 UG/ML FEU
EOSINOPHIL # BLD AUTO: 0.2 THOUSAND/ÂΜL (ref 0–0.61)
EOSINOPHIL NFR BLD AUTO: 4 % (ref 0–6)
ERYTHROCYTE [DISTWIDTH] IN BLOOD BY AUTOMATED COUNT: 13.9 % (ref 11.6–15.1)
FLUAV RNA RESP QL NAA+PROBE: NEGATIVE
FLUBV RNA RESP QL NAA+PROBE: NEGATIVE
GFR SERPL CREATININE-BSD FRML MDRD: 96 ML/MIN/1.73SQ M
GLUCOSE SERPL-MCNC: 96 MG/DL (ref 65–140)
HCT VFR BLD AUTO: 43.6 % (ref 34.8–46.1)
HGB BLD-MCNC: 14.7 G/DL (ref 11.5–15.4)
IMM GRANULOCYTES # BLD AUTO: 0.01 THOUSAND/UL (ref 0–0.2)
IMM GRANULOCYTES NFR BLD AUTO: 0 % (ref 0–2)
LYMPHOCYTES # BLD AUTO: 1.42 THOUSANDS/ÂΜL (ref 0.6–4.47)
LYMPHOCYTES NFR BLD AUTO: 26 % (ref 14–44)
MAGNESIUM SERPL-MCNC: 1.9 MG/DL (ref 1.6–2.6)
MCH RBC QN AUTO: 30 PG (ref 26.8–34.3)
MCHC RBC AUTO-ENTMCNC: 33.7 G/DL (ref 31.4–37.4)
MCV RBC AUTO: 89 FL (ref 82–98)
MONOCYTES # BLD AUTO: 0.43 THOUSAND/ÂΜL (ref 0.17–1.22)
MONOCYTES NFR BLD AUTO: 8 % (ref 4–12)
NEUTROPHILS # BLD AUTO: 3.27 THOUSANDS/ÂΜL (ref 1.85–7.62)
NEUTS SEG NFR BLD AUTO: 61 % (ref 43–75)
NRBC BLD AUTO-RTO: 0 /100 WBCS
NT-PROBNP SERPL-MCNC: 77 PG/ML
P AXIS: 41 DEGREES
PLATELET # BLD AUTO: 223 THOUSANDS/UL (ref 149–390)
PMV BLD AUTO: 8.9 FL (ref 8.9–12.7)
POTASSIUM SERPL-SCNC: 3.7 MMOL/L (ref 3.5–5.3)
PR INTERVAL: 128 MS
QRS AXIS: 80 DEGREES
QRSD INTERVAL: 76 MS
QT INTERVAL: 384 MS
QTC INTERVAL: 446 MS
RBC # BLD AUTO: 4.9 MILLION/UL (ref 3.81–5.12)
RSV RNA RESP QL NAA+PROBE: NEGATIVE
SARS-COV-2 RNA RESP QL NAA+PROBE: NEGATIVE
SODIUM SERPL-SCNC: 140 MMOL/L (ref 135–147)
T WAVE AXIS: 54 DEGREES
VENTRICULAR RATE: 81 BPM
WBC # BLD AUTO: 5.37 THOUSAND/UL (ref 4.31–10.16)

## 2022-12-03 RX ORDER — ALBUTEROL SULFATE 2.5 MG/3ML
5 SOLUTION RESPIRATORY (INHALATION) ONCE
Status: COMPLETED | OUTPATIENT
Start: 2022-12-03 | End: 2022-12-03

## 2022-12-03 RX ORDER — PREDNISONE 20 MG/1
40 TABLET ORAL DAILY
Qty: 8 TABLET | Refills: 0 | Status: SHIPPED | OUTPATIENT
Start: 2022-12-03 | End: 2022-12-07

## 2022-12-03 RX ORDER — PREDNISONE 20 MG/1
40 TABLET ORAL ONCE
Status: COMPLETED | OUTPATIENT
Start: 2022-12-03 | End: 2022-12-03

## 2022-12-03 RX ORDER — ALBUTEROL SULFATE 90 UG/1
2 AEROSOL, METERED RESPIRATORY (INHALATION) EVERY 4 HOURS PRN
Qty: 18 G | Refills: 0 | Status: SHIPPED | OUTPATIENT
Start: 2022-12-03

## 2022-12-03 RX ADMIN — PREDNISONE 40 MG: 20 TABLET ORAL at 11:10

## 2022-12-03 RX ADMIN — ALBUTEROL SULFATE 5 MG: 2.5 SOLUTION RESPIRATORY (INHALATION) at 10:15

## 2022-12-03 NOTE — ED PROVIDER NOTES
History  Chief Complaint   Patient presents with   • Chest Pain     Patient reports chest pain she describes as "pressure" on the left side of her chest that wraps under left armpit and around to back  Reports associated SOB with exertion  Patient reports pain started at 0700 this morning  Reports h/o cardiac cath in August         History provided by:  Patient   used: No    Chest Pain  Pain location:  L chest  Pain quality: pressure    Radiates to: left axilla  Pain radiates to the back: no    Pain severity:  Moderate  Onset quality:  Sudden  Duration: since 7 am   Timing:  Constant  Progression:  Unchanged  Chronicity:  Recurrent  Context comment: This is been going on for months  It waxes and wanes  Will get worse at times and is usually always on the left  Had an abnormal echo stress with abnormal response to exercise and had a normal cardiac catheterization  Had an abnormal PFT   Relieved by:  Nothing  Worsened by:  Deep breathing  Ineffective treatments:  None tried  Associated symptoms: shortness of breath    Associated symptoms: no abdominal pain, no cough, no diaphoresis, no fever, no nausea, no numbness, no palpitations, not vomiting and no weakness    Risk factors: no coronary artery disease, no prior DVT/PE and no smoking      PFT from November 11th:  IMPRESSION:  1  Moderate obstructive air flow limitation with normal vital capacity based on post bronchodilator spirometry  2  Significant bronchodilator response  3  Normal lung volumes  4  Normal diffusion capacity  5  Abnormal flow volume lobe consistent with obstruction   Prior to Admission Medications   Prescriptions Last Dose Informant Patient Reported? Taking? ARIPiprazole (ABILIFY) 5 mg tablet   No No   Sig: Take 1 tablet (5 mg total) by mouth daily at bedtime   Multiple Vitamin (MULTIVITAMIN) tablet   Yes No   Sig: Take 1 tablet by mouth daily     citalopram (CeleXA) 20 mg tablet   No No   Sig: take 1 and 1/2 tablets by mouth daily   hydrOXYzine HCL (ATARAX) 25 mg tablet   Yes No   prazosin (MINIPRESS) 5 mg capsule   Yes No   Sig: Take 10 mg by mouth      Facility-Administered Medications: None       Past Medical History:   Diagnosis Date   • Anxiety    • Depression    • Pilonidal cyst with abscess    • Seasonal allergies    • Varicella    • Wears glasses        Past Surgical History:   Procedure Laterality Date   • CARDIAC CATHETERIZATION  8/29/2022    Procedure: Cardiac catheterization;  Surgeon: Evonne Davenport MD;  Location: [facility]; Service: Cardiology   • CARDIAC CATHETERIZATION N/A 8/29/2022    Procedure: Cardiac Coronary Angiogram;  Surgeon: Evonne Davenport MD;  Location: [facility]; Service: Cardiology   • CHOLECYSTECTOMY  08/2010    Laparoscopic   • COLONOSCOPY N/A 5/9/2018    Procedure: COLONOSCOPY;  Surgeon: Adams Robb MD;  Location: [facility]; Service: Gastroenterology   • MT LAP,RMV  ADNEXAL STRUCTURE Bilateral 10/27/2016    Procedure: SALPINGECTOMY;  Surgeon: Margo Mart MD;  Location: [facility];  Service: Gynecology   • MT REMV PILONIDAL LESION SIMPLE N/A 4/28/2016    Procedure: EXCISION PILONIDAL CYST;  Surgeon: Gisselle Vallecillo MD;  Location: [facility];  Service: General   • SALPINGECTOMY     • WISDOM TOOTH EXTRACTION  01/2016    right upper only       Family History   Problem Relation Age of Onset   • Seizures Mother    • Other Mother         Epilepsy   • Mental illness Mother    • Dementia Mother    • Schizophrenia Mother    • Suicide Attempts Mother    • Dysrhythmia Father    • Hypertension Father    • Stroke Father    • Other Father         Cardiac disorder, cardiac pacemaker    • Heart disease Father    • Coronary artery disease Paternal Grandfather    • Cancer Family      I have reviewed and agree with the history as documented      E-Cigarette/Vaping   • E-Cigarette Use Never User      E-Cigarette/Vaping Substances   • Nicotine No    • THC No    • CBD No    • Flavoring No • Other No    • Unknown No      Social History     Tobacco Use   • Smoking status: Never   • Smokeless tobacco: Never   Vaping Use   • Vaping Use: Never used   Substance Use Topics   • Alcohol use: No   • Drug use: No       Review of Systems   Constitutional: Negative for diaphoresis and fever  Respiratory: Positive for chest tightness and shortness of breath  Negative for cough  Cardiovascular: Negative for chest pain, palpitations and leg swelling  Gastrointestinal: Negative for abdominal pain, nausea and vomiting  Genitourinary: Negative for difficulty urinating and dysuria  Neurological: Negative for weakness and numbness  All other systems reviewed and are negative  Physical Exam  Physical Exam  Vitals and nursing note reviewed  Constitutional:       General: She is not in acute distress  Appearance: Normal appearance  She is well-developed and well-nourished  She is not ill-appearing, toxic-appearing, sickly-appearing or diaphoretic  HENT:      Head: Normocephalic and atraumatic  Right Ear: Hearing normal  No drainage or swelling  Left Ear: Hearing normal  No drainage or swelling  Mouth/Throat:      Mouth: Mucous membranes are normal    Eyes:      General: Lids are normal          Right eye: No discharge  Left eye: No discharge  Conjunctiva/sclera: Conjunctivae normal    Neck:      Vascular: No JVD  Trachea: Trachea normal    Cardiovascular:      Rate and Rhythm: Normal rate and regular rhythm  Pulses: Normal pulses and intact distal pulses  Heart sounds: Normal heart sounds  No murmur heard  No friction rub  No gallop  Pulmonary:      Effort: Pulmonary effort is normal  No respiratory distress  Breath sounds: Normal breath sounds  No stridor  No wheezing or rales  Chest:      Chest wall: No tenderness  Abdominal:      Palpations: Abdomen is soft  Tenderness: There is no abdominal tenderness   There is no CVA tenderness, guarding or rebound  Musculoskeletal:         General: No tenderness, deformity or edema  Normal range of motion  Cervical back: Normal range of motion  Right lower leg: No edema  Left lower leg: No edema  Skin:     General: Skin is warm, dry and intact  Coloration: Skin is not pale  Findings: No rash  Neurological:      General: No focal deficit present  Mental Status: She is alert  GCS: GCS eye subscore is 4  GCS verbal subscore is 5  GCS motor subscore is 6  Sensory: No sensory deficit  Motor: Motor strength is normal  No abnormal muscle tone  Psychiatric:         Mood and Affect: Mood and affect and mood normal          Speech: Speech normal          Behavior: Behavior is cooperative           Cognition and Memory: Cognition and memory normal          Vital Signs  ED Triage Vitals   Temperature Pulse Respirations Blood Pressure SpO2   12/03/22 0947 12/03/22 0947 12/03/22 0947 12/03/22 0947 12/03/22 0947   99 1 °F (37 3 °C) 88 20 135/73 100 %      Temp Source Heart Rate Source Patient Position - Orthostatic VS BP Location FiO2 (%)   12/03/22 0947 12/03/22 0947 12/03/22 0947 12/03/22 0947 --   Oral Monitor Sitting Right arm       Pain Score       12/03/22 0946       6           Vitals:    12/03/22 0947 12/03/22 1111   BP: 135/73 122/69   Pulse: 88 89   Patient Position - Orthostatic VS: Sitting Sitting         Visual Acuity      ED Medications  Medications   albuterol inhalation solution 5 mg (5 mg Nebulization Given 12/3/22 1015)   predniSONE tablet 40 mg (40 mg Oral Given 12/3/22 1110)       Diagnostic Studies  Results Reviewed     Procedure Component Value Units Date/Time    HS Troponin I 4hr [[id number]]     Lab Status: No result Specimen: Blood     FLU/RSV/COVID - if FLU/RSV clinically relevant [[id number]]  (Normal) Collected: 12/03/22 1018    Lab Status: Final result Specimen: Nares from Nasopharyngeal Swab Updated: 12/03/22 1108     SARS-CoV-2 Negative INFLUENZA A PCR Negative     INFLUENZA B PCR Negative     RSV PCR Negative    Narrative:      FOR PEDIATRIC PATIENTS - copy/paste COVID Guidelines URL to browser: [email or web address]  [email or web address]    SARS-CoV-2 assay is a Nucleic Acid Amplification assay intended for the  qualitative detection of nucleic acid from SARS-CoV-2 in nasopharyngeal  swabs  Results are for the presumptive identification of SARS-CoV-2 RNA  Positive results are indicative of infection with SARS-CoV-2, the virus  causing COVID-19, but do not rule out bacterial infection or co-infection  with other viruses  Laboratories within the [place] and its  territories are required to report all positive results to the appropriate  public health authorities  Negative results do not preclude SARS-CoV-2  infection and should not be used as the sole basis for treatment or other  patient management decisions  Negative results must be combined with  clinical observations, patient history, and epidemiological information  This test has not been FDA cleared or approved  This test has been authorized by FDA under an Emergency Use Authorization  (EUA)  This test is only authorized for the duration of time the  declaration that circumstances exist justifying the authorization of the  emergency use of an in vitro diagnostic tests for detection of SARS-CoV-2  virus and/or diagnosis of COVID-19 infection under section 564(b)(1) of  the Act, 21 U  S C  415UTU-9(A)(4), unless the authorization is terminated  or revoked sooner  The test has been validated but independent review by FDA  and CLIA is pending  Test performed using [employer] GeneXpert: This RT-PCR assay targets N2,  a region unique to SARS-CoV-2  A conserved region in the E-gene was chosen  for pan-Sarbecovirus detection which includes SARS-CoV-2  According to CMS-2020-01-R, this platform meets the definition of high-throughput technology      HS Troponin I 2hr [[id number]]     Lab Status: No result Specimen: Blood     HS Troponin 0hr (reflex protocol) [[id number]]  (Normal) Collected: 12/03/22 1018    Lab Status: Final result Specimen: Blood from Arm, Left Updated: 12/03/22 1051     hs TnI 0hr <2 ng/L     Basic metabolic panel [[id number]] Collected: 12/03/22 1018    Lab Status: Final result Specimen: Blood from Arm, Left Updated: 12/03/22 1051     Sodium 140 mmol/L      Potassium 3 7 mmol/L      Chloride 104 mmol/L      CO2 27 mmol/L      ANION GAP 9 mmol/L      BUN 9 mg/dL      Creatinine 0 81 mg/dL      Glucose 96 mg/dL      Calcium 8 7 mg/dL      eGFR 96 ml/min/1 73sq m     Narrative:      [place] guidelines for Chronic Kidney Disease (CKD):   •  Stage 1 with normal or high GFR (GFR > 90 mL/min/1 73 square meters)  •  Stage 2 Mild CKD (GFR = 60-89 mL/min/1 73 square meters)  •  Stage 3A Moderate CKD (GFR = 45-59 mL/min/1 73 square meters)  •  Stage 3B Moderate CKD (GFR = 30-44 mL/min/1 73 square meters)  •  Stage 4 Severe CKD (GFR = 15-29 mL/min/1 73 square meters)  •  Stage 5 End Stage CKD (GFR <15 mL/min/1 73 square meters)  Note: GFR calculation is accurate only with a steady state creatinine    Magnesium [[id number]]  (Normal) Collected: 12/03/22 1018    Lab Status: Final result Specimen: Blood from Arm, Left Updated: 12/03/22 1051     Magnesium 1 9 mg/dL     NT-BNP PRO [[id number]]  (Normal) Collected: 12/03/22 1018    Lab Status: Final result Specimen: Blood from Arm, Left Updated: 12/03/22 1051     NT-proBNP 77 pg/mL     D-Dimer [[id number]]  (Normal) Collected: 12/03/22 1018    Lab Status: Final result Specimen: Blood from Arm, Left Updated: 12/03/22 1042     D-Dimer, Quant 0 42 ug/ml FEU     CBC and differential [[id number]] Collected: 12/03/22 1018    Lab Status: Final result Specimen: Blood from Arm, Left Updated: 12/03/22 1025     WBC 5 37 Thousand/uL      RBC 4 90 Million/uL      Hemoglobin 14 7 g/dL      Hematocrit 43 6 %      MCV 89 fL      MCH 30 0 pg      MCHC 33 7 g/dL      RDW 13 9 %      MPV 8 9 fL      Platelets 697 Thousands/uL      nRBC 0 /100 WBCs      Neutrophils Relative 61 %      Immat GRANS % 0 %      Lymphocytes Relative 26 %      Monocytes Relative 8 %      Eosinophils Relative 4 %      Basophils Relative 1 %      Neutrophils Absolute 3 27 Thousands/µL      Immature Grans Absolute 0 01 Thousand/uL      Lymphocytes Absolute 1 42 Thousands/µL      Monocytes Absolute 0 43 Thousand/µL      Eosinophils Absolute 0 20 Thousand/µL      Basophils Absolute 0 04 Thousands/µL     POCT pregnancy, urine [[id number]]     Lab Status: No result              I have personally reviewed the x-ray and my findings are: no acute disease  XR chest 2 views    (Results Pending)              Procedures  ECG 12 Lead Documentation Only    Date/Time: 12/3/2022 10:57 AM  Performed by: Charisse Allen MD  Authorized by: Charisse Allen MD     Indications / Diagnosis:  Left sided cp  ECG reviewed by me, the ED Provider: yes    Patient location:  ED  Previous ECG:     Comparison to cardiac monitor: Yes    Interpretation:     Interpretation: normal    Rate:     ECG rate assessment: normal    Rhythm:     Rhythm: sinus rhythm    Ectopy:     Ectopy: none    QRS:     QRS axis:  Normal  Conduction:     Conduction: normal    ST segments:     ST segments:  Normal  T waves:     T waves: normal               ED Course  ED Course as of 12/03/22 1205   Sat Dec 03, 2022   1018 Discussion with pulmonologist and PFTs are consistent with asthma and agrees with albuterol and current workup  Recommends prednisone on discharge  She does have an appoint with a pulmonologist on Monday               HEART Risk Score    Flowsheet Row Most Recent Value   Heart Score Risk Calculator    History 0 Filed at: 12/03/2022 1123   ECG 0 Filed at: 12/03/2022 1123   Age 0 Filed at: 12/03/2022 1123   Risk Factors 0 Filed at: 12/03/2022 1123   Troponin 0 Filed at: 12/03/2022 1123   HEART Score 0 Filed at: 12/03/2022 1123                        SBIRT 20yo+    Flowsheet Row Most Recent Value   SBIRT (23 yo +)    In order to provide better care to our patients, we are screening all of our patients for alcohol and drug use  Would it be okay to ask you these screening questions? No Filed at: 12/03/2022 1003                    MDM  Number of Diagnoses or Management Options  Asthma  Chest pain  Diagnosis management comments: Chest x-ray shows no acute disease  D-dimer negative  Troponin and EKG normal   Review of PFT shows that this is most likely asthma  I spoke with pulmonology on-call and they agree with the interpretation that this is most likely asthma  Patient felt better after albuterol treatment  Will place on steroids and she has a follow-up with pulmonology on Monday  She had a normal cardiac catheterization in the past no feel a 2 hour troponin is necessary  Amount and/or Complexity of Data Reviewed  Clinical lab tests: ordered and reviewed  Tests in the radiology section of CPT®: reviewed and ordered  Tests in the medicine section of CPT®: ordered and reviewed  Independent visualization of images, tracings, or specimens: yes    Patient Progress  Patient progress: stable      Disposition  Final diagnoses:   Chest pain   Asthma     Time reflects when diagnosis was documented in both MDM as applicable and the Disposition within this note     Time User Action Codes Description Comment    12/3/2022 11:23 AM Familia Montgomery Add [R07 [id number]] Chest pain     12/3/2022 11:23 AM Familia Montgomery Add [K33 [id number]] Asthma       ED Disposition     ED Disposition   Discharge    Condition   Stable    Date/Time   Sat Dec 3, 2022 11:23 AM    Comment   Lana Barillas discharge to home/self care                 Follow-up Information     Follow up With Specialties Details Why Contact Gracia Givens DO Family Medicine Schedule an appointment as soon as possible for a visit in 3 days If symptoms worsen [facility]  [phone]      Keep your appointment with your pulmonologist on Monday  Patient's Medications   Discharge Prescriptions    ALBUTEROL (PROVENTIL HFA,VENTOLIN HFA) 90 MCG/ACT INHALER    Inhale 2 puffs every 4 (four) hours as needed for wheezing       Start Date: 12/3/2022 End Date: --       Order Dose: 2 puffs       Quantity: 18 g    Refills: 0    PREDNISONE 20 MG TABLET    Take 2 tablets (40 mg total) by mouth daily for 4 days       Start Date: 12/3/2022 End Date: 12/7/2022       Order Dose: 40 mg       Quantity: 8 tablet    Refills: 0       No discharge procedures on file      PDMP Review       Value Time User    PDMP Reviewed  Yes 8/30/2021  4:40 PM Christen Dawson DO          ED Provider  Electronically Signed by           Alexandra Rodas MD  12/03/22 4661

## 2022-12-05 ENCOUNTER — CONSULT (OUTPATIENT)
Dept: PULMONOLOGY | Facility: CLINIC | Age: 32
End: 2022-12-05

## 2022-12-05 VITALS
BODY MASS INDEX: 26.66 KG/M2 | OXYGEN SATURATION: 99 % | SYSTOLIC BLOOD PRESSURE: 118 MMHG | HEIGHT: 65 IN | DIASTOLIC BLOOD PRESSURE: 70 MMHG | WEIGHT: 160 LBS | HEART RATE: 85 BPM | RESPIRATION RATE: 18 BRPM

## 2022-12-05 DIAGNOSIS — Z23 FLU VACCINE NEED: ICD-10-CM

## 2022-12-05 DIAGNOSIS — J45.41 MODERATE PERSISTENT ASTHMA WITH ACUTE EXACERBATION: Primary | ICD-10-CM

## 2022-12-05 RX ORDER — BUDESONIDE AND FORMOTEROL FUMARATE DIHYDRATE 160; 4.5 UG/1; UG/1
2 AEROSOL RESPIRATORY (INHALATION) 2 TIMES DAILY
Qty: 10.2 G | Refills: 2 | Status: SHIPPED | OUTPATIENT
Start: 2022-12-05

## 2022-12-05 NOTE — PROGRESS NOTES
Pulmonary Consultation   Julio Barillas 28 y o  female MRN: [id number]  12/5/2022      Reason for Consultation:  SOB    Requested by: Zaire Navas DO     Assessment:    Moderate persistent asthma with acute exacerbation  PFTs show severe obstruction with strong bronchodilator response  Continue albuterol inhaler as needed  Will start symbicort 160-4 5mcg inhaler with goal to decrease rescue inhaler use  Patient counseled regarding how to use inhaler  Breo sample given in office in the case that symbicort is not covered by insurance    Flu vaccine need  Patient has not had flu vaccine as of yet, is agreeable to get in office      Plan:    Problem List Items Addressed This Visit        Respiratory    Moderate persistent asthma with acute exacerbation - Primary     PFTs show severe obstruction with strong bronchodilator response  Continue albuterol inhaler as needed  Will start symbicort 160-4 5mcg inhaler with goal to decrease rescue inhaler use  Patient counseled regarding how to use inhaler  Breo sample given in office in the case that symbicort is not covered by insurance         Relevant Medications    budesonide-formoterol (Symbicort) 160-4 5 mcg/act inhaler    Other Relevant Orders    influenza vaccine, quadrivalent, recombinant, PF, 0 5 mL, for patients 18 yr+ (FLUBLOK) (Completed)       Other    Flu vaccine need     Patient has not had flu vaccine as of yet, is agreeable to get in office            History of Present Illness   HPI:  Julio Barillas is a 28 y o  female with a PMH of Anxiety, MDD, PTSD who presents with SOB  Patient is accompanied by her mother in law  She states since July 2022 she has noted she becomes very short of breath and develops chest pressure with exercise  She states it has gotten progressively worse over the last few months  She initially went to the ED in July as she had chest pressure and they referred her to cardiology as she has a history of early CAD on her father's side   She had a stress echo which was benign and then a cardia cath which was free of coronary disease  She was ordered PFTs which showed severe obstruction which had significant bronchodilator response and thus she was referred to pulmonology  She states she develops the shortness of breath when exercising and occasionally at rest  She recently went to the ED on Sat 12/3 and was discharged with prednisone 5 day course and albuterol inhaler  She states she feels symptomatically improved with these  Able to walk up full case of stairs when prior she had to stop at the landing to catch her breath  SOB is associated with chest pressure, dry cough and lightheadedness  Otherwise denies any fevers, chills, productive cough, congestion, rhinorrhea  She wakes up every night due to cough and shortness of breath  She was never diagnosed with asthma as a child, wasn't sick often  Was able to keep up with kids in gym class, ran track in middle school and was one of the faster kids  She has never been hospitalized or intubated  She denies any occupational or recreational exposures  No significant tobacco exposure  Had a kerosene stove in her home growing up for a few years  She denies any recent changes to medications or medical history  She had covid 3 times in which she developed bronchitis each time, in Oct 2021, Jan 2022 and Sept 2022  She was able to run 3 miles in April 2022 prior to onset in July  Review of Systems   Constitutional: Positive for fatigue  Negative for activity change, chills, diaphoresis and fever  HENT: Negative for congestion, postnasal drip, rhinorrhea, sinus pressure, sneezing, sore throat and trouble swallowing  Eyes: Negative  Respiratory: Positive for cough, chest tightness and shortness of breath  Cardiovascular: Negative for chest pain, palpitations and leg swelling  Gastrointestinal: Negative for constipation, diarrhea, nausea and vomiting  Endocrine: Negative  Genitourinary: Negative  Musculoskeletal: Negative for back pain, joint swelling and neck pain  Skin: Negative  Allergic/Immunologic: Negative  Neurological: Positive for light-headedness  Negative for dizziness, syncope, weakness and headaches  Hematological: Negative  Psychiatric/Behavioral: Negative  All other systems reviewed and are negative  Historical Information   Past Medical History:   Diagnosis Date   • Anxiety    • Depression    • Pilonidal cyst with abscess    • Seasonal allergies    • Varicella    • Wears glasses      Past Surgical History:   Procedure Laterality Date   • CARDIAC CATHETERIZATION  8/29/2022    Procedure: Cardiac catheterization;  Surgeon: Kayla Deutsch MD;  Location: [facility]; Service: Cardiology   • CARDIAC CATHETERIZATION N/A 8/29/2022    Procedure: Cardiac Coronary Angiogram;  Surgeon: Kayla Deutsch MD;  Location: [facility]; Service: Cardiology   • CHOLECYSTECTOMY  08/2010    Laparoscopic   • COLONOSCOPY N/A 5/9/2018    Procedure: COLONOSCOPY;  Surgeon: Arsen Souza MD;  Location: [facility];   Service: Gastroenterology   • CT LAP,RMV  ADNEXAL STRUCTURE Bilateral 10/27/2016    Procedure: SALPINGECTOMY;  Surgeon: Amira Alatorre MD;  Location: [facility];  Service: Gynecology   • CT REMV PILONIDAL LESION SIMPLE N/A 4/28/2016    Procedure: EXCISION PILONIDAL CYST;  Surgeon: Antonino Bassett MD;  Location: [facility];  Service: General   • SALPINGECTOMY     • WISDOM TOOTH EXTRACTION  01/2016    right upper only     Family History   Problem Relation Age of Onset   • Seizures Mother    • Other Mother         Epilepsy   • Mental illness Mother    • Dementia Mother    • Schizophrenia Mother    • Suicide Attempts Mother    • Dysrhythmia Father    • Hypertension Father    • Stroke Father    • Other Father         Cardiac disorder, cardiac pacemaker    • Heart disease Father    • Coronary artery disease Paternal Grandfather    • Cancer Family        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    Social History:   Alcohol: none   Smoking: lifelong non-smoker  Illicit drugs: none   Home heating system: electric  Pets: none   Hobbies: watercolor painting    Meds/Allergies     Current Outpatient Medications:   •  albuterol (PROVENTIL HFA,VENTOLIN HFA) 90 mcg/act inhaler, Inhale 2 puffs every 4 (four) hours as needed for wheezing, Disp: 18 g, Rfl: 0  •  ARIPiprazole (ABILIFY) 5 mg tablet, Take 1 tablet (5 mg total) by mouth daily at bedtime, Disp: 30 tablet, Rfl: 1  •  budesonide-formoterol (Symbicort) 160-4 5 mcg/act inhaler, Inhale 2 puffs 2 (two) times a day Rinse mouth after use , Disp: 10 2 g, Rfl: 2  •  citalopram (CeleXA) 20 mg tablet, take 1 and 1/2 tablets by mouth daily, Disp: 45 tablet, Rfl: 0  •  hydrOXYzine HCL (ATARAX) 25 mg tablet, , Disp: , Rfl:   •  Multiple Vitamin (MULTIVITAMIN) tablet, Take 1 tablet by mouth daily  , Disp: , Rfl:   •  prazosin (MINIPRESS) 5 mg capsule, Take 10 mg by mouth, Disp: , Rfl:   •  predniSONE 20 mg tablet, Take 2 tablets (40 mg total) by mouth daily for 4 days, Disp: 8 tablet, Rfl: 0  Allergies   Allergen Reactions   • Gluten Meal - Food Allergy        Vitals: Blood pressure 118/70, pulse 85, resp  rate 18, height 5' 5" (1 651 m), weight 72 6 kg (160 lb), last menstrual period 11/12/2022, SpO2 99 %  , Body mass index is 26 63 kg/m²  Oxygen Therapy  SpO2: 99 %  Oxygen Therapy: None (Room air)    Physical Exam  Physical Exam  Vitals and nursing note reviewed  Constitutional:       Appearance: Normal appearance  She is normal weight  HENT:      Head: Normocephalic and atraumatic  Right Ear: External ear normal       Left Ear: External ear normal       Nose: Nose normal       Mouth/Throat:      Mouth: Mucous membranes are moist       Pharynx: Oropharynx is clear  Eyes:      Conjunctiva/sclera: Conjunctivae normal    Cardiovascular:      Rate and Rhythm: Normal rate and regular rhythm  Pulses: Normal pulses        Heart sounds: Normal heart sounds  Pulmonary:      Effort: Pulmonary effort is normal       Breath sounds: Normal breath sounds  Abdominal:      General: Abdomen is flat  Bowel sounds are normal       Palpations: Abdomen is soft  Musculoskeletal:         General: No swelling or tenderness  Cervical back: Neck supple  No muscular tenderness  Skin:     General: Skin is warm and dry  Capillary Refill: Capillary refill takes less than 2 seconds  Neurological:      Mental Status: She is alert and oriented to person, place, and time  Mental status is at baseline  Psychiatric:         Mood and Affect: Mood normal          Behavior: Behavior normal          Thought Content: Thought content normal          Judgment: Judgment normal          Labs: I have personally reviewed pertinent lab results  Lab Results   Component Value Date    WBC 5 37 12/03/2022    HGB 14 7 12/03/2022    HCT 43 6 12/03/2022    MCV 89 12/03/2022     12/03/2022     Lab Results   Component Value Date    CALCIUM 8 7 12/03/2022     01/20/2018    K 3 7 12/03/2022    CO2 27 12/03/2022     12/03/2022    BUN 9 12/03/2022    CREATININE 0 81 12/03/2022     No results found for: IGE  Lab Results   Component Value Date    ALT 21 08/23/2022    AST 17 08/23/2022    ALKPHOS 61 08/23/2022    BILITOT 0 7 01/20/2018         Imaging and other studies: I have personally reviewed pertinent films in PACS  CXR 12/3/22: normal CXR  CXR 7/20/22: benign CXR    Pulmonary function testing 11/9/22: Severe obstruction with strong bronchodilator response, normal lung volumes and diffusion capacity    EKG, Pathology, and Other Studies: I have personally reviewed pertinent reports        Echo stress test: 7/27/22: Normal stress test  Cardiac cath 8/29/22: normal cardiac cath    Dima Domínguez MD  Pulmonary/Critical Care Fellow PGY-IV  [facility]

## 2022-12-05 NOTE — ASSESSMENT & PLAN NOTE
PFTs show severe obstruction with strong bronchodilator response  Continue albuterol inhaler as needed  Will start symbicort 160-4 5mcg inhaler with goal to decrease rescue inhaler use  Patient counseled regarding how to use inhaler   Breo sample given in office in the case that symbicort is not covered by insurance

## 2022-12-05 NOTE — PATIENT INSTRUCTIONS
Asthma   WHAT YOU NEED TO KNOW:   Asthma is a lung disease that makes breathing difficult  Chronic inflammation and reactions to triggers narrow the airways in the lungs  Asthma can become life-threatening if it is not managed  DISCHARGE INSTRUCTIONS:   Call your local emergency number (911 in the [address] [facility]) if:   You have severe shortness of breath  The skin around your neck and ribs pulls in with each breath  Your peak flow numbers are in the red zone of your AAP  Return to the emergency department if:   You have shortness of breath, even after you take your short-term medicine as directed  Your lips or nails turn blue or gray  Call your doctor or asthma specialist if:   You run out of medicine before your next refill is due  Your symptoms get worse  You need to take more medicine than usual to control your symptoms  You have questions or concerns about your condition or care  Medicines:   Medicines  may be used to decrease inflammation, open airways, and make it easier to breathe  Medicines may be inhaled, taken as a pill, or injected  Short-term medicines relieve your symptoms quickly  Long-term medicines are used to prevent future asthma attacks  Other medicines may be needed if your regular medicines are not able to prevent attacks  You may also need medicine to help control your allergies  Ask your healthcare provider for more information about any medicine you are given and how to take it safely  Take your medicine as directed  Contact your healthcare provider if you think your medicine is not helping or if you have side effects  Tell him of her if you are allergic to any medicine  Keep a list of the medicines, vitamins, and herbs you take  Include the amounts, and when and why you take them  Bring the list or the pill bottles to follow-up visits  Carry your medicine list with you in case of an emergency  Manage your symptoms and prevent future attacks:    Follow your Asthma Action Plan (AAP)  This is a written plan that you and your healthcare provider create  It explains which medicine you need and when to change doses if necessary  It also explains how you can monitor symptoms and use a peak flow meter  The meter measures how well your lungs are working  Manage other health conditions , such as allergies, acid reflux, and sleep apnea  Identify and avoid triggers  These may include pets, dust mites, mold, and cockroaches  Do not smoke or be around others who smoke  Nicotine and other chemicals in cigarettes and cigars can cause lung damage  Ask your healthcare provider for information if you currently smoke and need help to quit  E-cigarettes or smokeless tobacco still contain nicotine  Talk to your healthcare provider before you use these products  Ask about the flu vaccine  The flu can make your asthma worse  You may need a yearly flu shot  Follow up with your healthcare provider as directed: You will need to return to make sure your medicine is working and your symptoms are controlled  You may be referred to an asthma or allergy specialist  Fanny Fabian may be asked to keep a record of your peak flow values and bring it with you to your appointments  Write down your questions so you remember to ask them during your visits  © Copyright [employer] 2022 Information is for End User's use only and may not be sold, redistributed or otherwise used for commercial purposes  All illustrations and images included in CareNotes® are the copyrighted property of [employer]  or Shayla Powers  The above information is an  only  It is not intended as medical advice for individual conditions or treatments  Talk to your doctor, nurse or pharmacist before following any medical regimen to see if it is safe and effective for you

## 2023-01-26 ENCOUNTER — TELEPHONE (OUTPATIENT)
Dept: PULMONOLOGY | Facility: CLINIC | Age: 33
End: 2023-01-26

## 2023-01-26 NOTE — TELEPHONE ENCOUNTER
Patient is calling, she said yesterday it hurt to breathe and had an asthma attack, her rescue inhaler was not doing enough to relief her  She went to the ER at [address] [place] [address] [address] and they treated her with Toradol 15mg, Prednisone 60mg and a nebulizer treatment of Ipratropium-Albuterol DUO-NEB that gave her relief  She was discharged with a course of prednisone for 5 days  She is still feeling a bit of chest tightness but no asthma attacks yet today  She was advised to let her pulmonary doctor know and see if he would like to see her before her appointment on 2/28 or if there was anything else he recommends in the mean time  Patient does not currently have a nebulizer machine at home   Please advise

## 2023-01-26 NOTE — TELEPHONE ENCOUNTER
I called and spoke with patient  She state's that her asthma attacks feel like "chest pressure/and as if she is drowning " She does not have any wheezes  She is able to speak in full sentences  We discussed how she uses her inhalers and she described correct technique  She denies any winter allergies although she is keeping a log of what provokes her asthma attacks  She was prescribed prednisone by the ED yesterday, however, she has not picked this up from the pharmacy yet  I advised her to pick this up as soon as possible and take her first dose  I also advised that she may use her albuterol HFA up to 4-6 puffs, if needed, during an acute exacerbation  She will call our office with any questions or changes

## 2023-02-25 DIAGNOSIS — J45.41 MODERATE PERSISTENT ASTHMA WITH ACUTE EXACERBATION: ICD-10-CM

## 2023-02-25 RX ORDER — BUDESONIDE AND FORMOTEROL FUMARATE DIHYDRATE 160; 4.5 UG/1; UG/1
AEROSOL RESPIRATORY (INHALATION)
Qty: 10.2 G | Refills: 0 | Status: SHIPPED | OUTPATIENT
Start: 2023-02-25

## 2023-02-27 ENCOUNTER — TELEMEDICINE (OUTPATIENT)
Dept: PULMONOLOGY | Facility: CLINIC | Age: 33
End: 2023-02-27

## 2023-02-27 DIAGNOSIS — J45.909 ASTHMA: Primary | ICD-10-CM

## 2023-02-27 DIAGNOSIS — J30.9 ALLERGIC RHINITIS, UNSPECIFIED SEASONALITY, UNSPECIFIED TRIGGER: ICD-10-CM

## 2023-02-27 RX ORDER — ALBUTEROL SULFATE 90 UG/1
2 AEROSOL, METERED RESPIRATORY (INHALATION) EVERY 4 HOURS PRN
Qty: 18 G | Refills: 4 | Status: SHIPPED | OUTPATIENT
Start: 2023-02-27

## 2023-02-27 RX ORDER — MONTELUKAST SODIUM 10 MG/1
10 TABLET ORAL
Qty: 30 TABLET | Refills: 5 | Status: SHIPPED | OUTPATIENT
Start: 2023-02-27

## 2023-02-27 NOTE — PROGRESS NOTES
Pulmonary Outpatient Note   Denise Queen 28 y o  female MRN: [id number]  2/27/2023      Reason for Consultation:    Chief Complaint   Patient presents with   • Asthma         Assessment/Plan:    1  Asthma  Assessment & Plan:  Triggers- dust, dryer lint, cardio exercise  Much improved on Symbicort 160-4 5 mcg 2 puffs BID  Now using albuterol around twice a week  She did have an asthma exacerbation 1 month ago, however, with no clear trigger  Plan  Continue Symbicort 160-4 5 mcg 2 puffs BID with PRN albuterol  Prescribe singulair 10 mg daily  Check [place] Rast panel  Recheck CBC w/ diff  Advised her to  a peak-flow meter and monitor readings- explained concept of an asthma action plan  RTC 2 months given her recent ED visit to assure she has stable symptoms  Orders:  -     montelukast (SINGULAIR) 10 mg tablet; Take 1 tablet (10 mg total) by mouth daily at bedtime  -     CBC and differential; Future  -     albuterol (PROVENTIL HFA,VENTOLIN HFA) 90 mcg/act inhaler; Inhale 2 puffs every 4 (four) hours as needed for wheezing    2  Allergic rhinitis, unspecified seasonality, unspecified trigger  -     [facility] Allergy Panel, Adult; Future  -     montelukast (SINGULAIR) 10 mg tablet; Take 1 tablet (10 mg total) by mouth daily at bedtime        Health Maintenance  Immunization History   Administered Date(s) Administered   • COVID-19 PFIZER VACCINE 0 3 ML IM 04/26/2021, 05/17/2021   • COVID-19 Pfizer vac (Chilo-sucrose, gray cap) 12 yr+ IM 01/14/2022   • INFLUENZA 11/27/2017   • Influenza Quadrivalent, 6-35 Months IM 11/27/2017   • Influenza, injectable, quadrivalent, preservative free 0 5 mL 11/06/2019, 11/16/2021   • Influenza, recombinant, quadrivalent,injectable, preservative free 01/13/2021, 12/05/2022   • Tdap 11/16/2021          Return in about 2 months (around 4/27/2023)  After connecting through the [employer] platform  She agrees to proceed  , the patient was identified by name and date of birth  Gertrude Flores was informed that this is a telemedicine visit and that the visit is being conducted through the [address] Now platform  She agrees to proceed     My office door was closed  No one else was in the room  She acknowledged consent and understanding of privacy and security of the video platform  The patient has agreed to participate and understands they can discontinue the visit at any time  History of Present Illness   HPI:  Gertrude Flores is a 28 y o  female who has asthma, anxiety, PTSD, depression who follows-up for asthma  Initially seen 12/2022 for dyspnea, cough, wheeze  She reported exercise-induced asthma  Recent prednisone course in December  Was started on Symbicort  She started this  This helped her use less albuterol- was using around 10 times a week  To 5 x a week  No nocturnal issues  She went to ED 1/25/23 for asthma exacerbation and prescribed prednisone  This helped  Since then she is only using albuterol around twice a week  She notices that triggers include dusting, vacuuming, cleaning out dryer lint without a mask  She gets symptoms- wheeze, cough with cardio exercise  She is able to do weight lifting  Can climb stairs  She takes zyrtec in spring time  No rhinorrhea, post-nasal drip  No heartburn  Review of Systems   Constitutional: Negative for chills and fever  HENT: Negative for ear pain and sore throat  Eyes: Negative for pain and visual disturbance  Respiratory: Positive for cough, chest tightness, shortness of breath and wheezing  Cardiovascular: Negative for chest pain and palpitations  Gastrointestinal: Negative for abdominal pain and vomiting  Genitourinary: Negative for dysuria and hematuria  Musculoskeletal: Negative for arthralgias and back pain  Skin: Negative for color change and rash  Neurological: Negative for seizures and syncope  All other systems reviewed and are negative            Historical Information Past Medical History:   Diagnosis Date   • Anxiety    • Depression    • Pilonidal cyst with abscess    • Seasonal allergies    • Varicella    • Wears glasses      Past Surgical History:   Procedure Laterality Date   • CARDIAC CATHETERIZATION  8/29/2022    Procedure: Cardiac catheterization;  Surgeon: Sisi Laureano MD;  [facility]; Service: Cardiology   • CARDIAC CATHETERIZATION N/A 8/29/2022    Procedure: Cardiac Coronary Angiogram;  Surgeon: Sisi Laureano MD;  [facility]; Service: Cardiology   • CHOLECYSTECTOMY  08/2010    Laparoscopic   • COLONOSCOPY N/A 5/9/2018    Procedure: COLONOSCOPY;  Surgeon: Rob Ortiz MD;  [facility];   Service: Gastroenterology   • [place] EXCISION PILONIDAL CYST/SINUS SIMPLE N/A 4/28/2016    Procedure: EXCISION PILONIDAL CYST;  Surgeon: Hiram Lombard, MD;  [facility];  Service: General   • [place] LAPAROSCOPY W/RMVL ADNEXAL STRUCTURES Bilateral 10/27/2016    Procedure: SALPINGECTOMY;  Surgeon: Josue Parks MD;  [facility];  Service: Gynecology   • SALPINGECTOMY     • WISDOM TOOTH EXTRACTION  01/2016    right upper only     Family History   Problem Relation Age of Onset   • Seizures Mother    • Other Mother         Epilepsy   • Mental illness Mother    • Dementia Mother    • Schizophrenia Mother    • Suicide Attempts Mother    • Dysrhythmia Father    • Hypertension Father    • Stroke Father    • Other Father         Cardiac disorder, cardiac pacemaker    • Heart disease Father    • Coronary artery disease Paternal Grandfather    • Cancer Family              Meds/Allergies     Current Outpatient Medications:   •  albuterol (PROVENTIL HFA,VENTOLIN HFA) 90 mcg/act inhaler, Inhale 2 puffs every 4 (four) hours as needed for wheezing, Disp: 18 g, Rfl: 4  •  ARIPiprazole (ABILIFY) 5 mg tablet, Take 1 tablet (5 mg total) by mouth daily at bedtime, Disp: 30 tablet, Rfl: 1  •  citalopram (CeleXA) 20 mg tablet, take 1 and 1/2 tablets by mouth daily, Disp: 45 tablet, Rfl: 0  •  hydrOXYzine HCL (ATARAX) 25 mg tablet, , Disp: , Rfl:   •  montelukast (SINGULAIR) 10 mg tablet, Take 1 tablet (10 mg total) by mouth daily at bedtime, Disp: 30 tablet, Rfl: 5  •  Multiple Vitamin (MULTIVITAMIN) tablet, Take 1 tablet by mouth daily  , Disp: , Rfl:   •  prazosin (MINIPRESS) 5 mg capsule, Take 10 mg by mouth, Disp: , Rfl:   •  Symbicort 160-4 5 MCG/ACT inhaler, INHALE TWO PUFFS BY MOUTH TWICE DAILY  RINSE MOUTH AFTER USE , Disp: 10 2 g, Rfl: 0  Allergies   Allergen Reactions   • Gluten Meal - Food Allergy        Vitals: Virtual Visit    Physical Exam- Limited- Virtual visit  Physical Exam  Constitutional:       General: She is not in acute distress  Appearance: Normal appearance  She is not ill-appearing, toxic-appearing or diaphoretic  HENT:      Head: Normocephalic and atraumatic  Mouth/Throat:      Mouth: Mucous membranes are moist       Pharynx: Oropharynx is clear  Pulmonary:      Effort: Pulmonary effort is normal  No respiratory distress  Neurological:      Mental Status: She is alert and oriented to person, place, and time  Psychiatric:         Mood and Affect: Mood normal          Behavior: Behavior normal          Labs: I have personally reviewed pertinent lab results      ABG:   Lab Results   Component Value Date    SOURCE None given 08/17/2022   ,   BNP: No results found for: BNP,   CBC:  Lab Results   Component Value Date    WBC 5 37 12/03/2022    HGB 14 7 12/03/2022    HCT 43 6 12/03/2022    MCV 89 12/03/2022     12/03/2022    EOSPCT 4 12/03/2022    EOSABS 0 20 12/03/2022    NEUTOPHILPCT 61 12/03/2022    LYMPHOPCT 26 12/03/2022   ,   CMP:   Lab Results   Component Value Date    SODIUM 140 12/03/2022    K 3 7 12/03/2022     12/03/2022    CO2 27 12/03/2022    BUN 9 12/03/2022    CREATININE 0 81 12/03/2022    CALCIUM 8 7 12/03/2022    AST 17 08/23/2022    ALT 21 08/23/2022    ALKPHOS 61 08/23/2022    PROT 7 4 01/20/2018    BILITOT 0 7 01/20/2018    EGFR 96 12/03/2022   ,   PT/INR: No results found for: PT, INR,   Troponin: No results found for: TROPONINI      Imaging and other studies: I have personally reviewed pertinent reports  and I have personally reviewed pertinent films in PACS    CXR 12/3/22  Lungs clear    CXR 1/25/23 at [facility] report only  Lungs clear      Pulmonary function testing:   Spirometry:  FEV1/FVC Ratio is 34 %  FEV1 is 1 15 L/32 % of predicted  FVC is 3 35 L/79 % of predicted  There is significant bronchodilator response     Lung volumes:  RV 1 18 L/74 % of predicted, TLC 4 70 L/84 % of predicted, RV/TLC ratio 25 %     Diffusing capacity:  93 % of predicted     IMPRESSION:  1  Moderate obstructive air flow limitation with normal vital capacity based on post bronchodilator spirometry  2  Significant bronchodilator response  3  Normal lung volumes  4  Normal diffusion capacity  5  Abnormal flow volume lobe consistent with obstruction     EKG, Pathology, and Other Studies: I have personally reviewed pertinent reports  [facility]

## 2023-02-27 NOTE — ASSESSMENT & PLAN NOTE
Triggers- dust, dryer lint, cardio exercise  Much improved on Symbicort 160-4 5 mcg 2 puffs BID  Now using albuterol around twice a week  She did have an asthma exacerbation 1 month ago, however, with no clear trigger  Plan  Continue Symbicort 160-4 5 mcg 2 puffs BID with PRN albuterol  Prescribe singulair 10 mg daily  Check [place] Rast panel  Recheck CBC w/ diff  Advised her to  a peak-flow meter and monitor readings- explained concept of an asthma action plan  RTC 2 months given her recent ED visit to assure she has stable symptoms

## 2023-02-28 ENCOUNTER — APPOINTMENT (OUTPATIENT)
Dept: LAB | Facility: MEDICAL CENTER | Age: 33
End: 2023-02-28

## 2023-02-28 DIAGNOSIS — J45.909 ASTHMA: ICD-10-CM

## 2023-02-28 DIAGNOSIS — J30.9 ALLERGIC RHINITIS, UNSPECIFIED SEASONALITY, UNSPECIFIED TRIGGER: ICD-10-CM

## 2023-02-28 LAB
BASOPHILS # BLD AUTO: 0.05 THOUSANDS/ÂΜL (ref 0–0.1)
BASOPHILS NFR BLD AUTO: 1 % (ref 0–1)
EOSINOPHIL # BLD AUTO: 0.28 THOUSAND/ÂΜL (ref 0–0.61)
EOSINOPHIL NFR BLD AUTO: 4 % (ref 0–6)
ERYTHROCYTE [DISTWIDTH] IN BLOOD BY AUTOMATED COUNT: 12.7 % (ref 11.6–15.1)
HCT VFR BLD AUTO: 45.1 % (ref 34.8–46.1)
HGB BLD-MCNC: 14.9 G/DL (ref 11.5–15.4)
IMM GRANULOCYTES # BLD AUTO: 0.02 THOUSAND/UL (ref 0–0.2)
IMM GRANULOCYTES NFR BLD AUTO: 0 % (ref 0–2)
LYMPHOCYTES # BLD AUTO: 2.01 THOUSANDS/ÂΜL (ref 0.6–4.47)
LYMPHOCYTES NFR BLD AUTO: 26 % (ref 14–44)
MCH RBC QN AUTO: 30.5 PG (ref 26.8–34.3)
MCHC RBC AUTO-ENTMCNC: 33 G/DL (ref 31.4–37.4)
MCV RBC AUTO: 92 FL (ref 82–98)
MONOCYTES # BLD AUTO: 0.65 THOUSAND/ÂΜL (ref 0.17–1.22)
MONOCYTES NFR BLD AUTO: 9 % (ref 4–12)
NEUTROPHILS # BLD AUTO: 4.6 THOUSANDS/ÂΜL (ref 1.85–7.62)
NEUTS SEG NFR BLD AUTO: 60 % (ref 43–75)
NRBC BLD AUTO-RTO: 0 /100 WBCS
PLATELET # BLD AUTO: 278 THOUSANDS/UL (ref 149–390)
PMV BLD AUTO: 9 FL (ref 8.9–12.7)
RBC # BLD AUTO: 4.89 MILLION/UL (ref 3.81–5.12)
WBC # BLD AUTO: 7.61 THOUSAND/UL (ref 4.31–10.16)

## 2023-03-01 LAB

## 2023-03-11 NOTE — RESULT ENCOUNTER NOTE
Juan Jose Sanchez,    Your allergy testing and complete blood count were unremarkable  No change in management at this time  Please follow-up with Dr Jordi Waterman as previously planned or call the office sooner with any questions or concerns      [address]

## 2023-04-03 ENCOUNTER — TELEPHONE (OUTPATIENT)
Dept: PULMONOLOGY | Facility: CLINIC | Age: 33
End: 2023-04-03

## 2023-04-03 DIAGNOSIS — J45.41 MODERATE PERSISTENT ASTHMA WITH ACUTE EXACERBATION: Primary | ICD-10-CM

## 2023-04-03 RX ORDER — ALBUTEROL SULFATE 1.25 MG/3ML
1.25 SOLUTION RESPIRATORY (INHALATION) EVERY 6 HOURS PRN
Qty: 60 ML | Refills: 3 | Status: SHIPPED | OUTPATIENT
Start: 2023-04-03 | End: 2023-05-03

## 2023-04-03 RX ORDER — PREDNISONE 20 MG/1
40 TABLET ORAL DAILY
Qty: 10 TABLET | Refills: 0 | Status: SHIPPED | OUTPATIENT
Start: 2023-04-03 | End: 2023-04-08

## 2023-04-03 NOTE — TELEPHONE ENCOUNTER
I called patient back- ordered 40 mg prednisone x 5 days  She understands if she worsens on this and while using albuterol every 4-6 hours while continuing symbicort to go to the ED for evaluation  Curtis Spears- I also ordered nebulized albuterol to her pharmacy- and I ordered a nebulizer  Can you arrange her to get a nebulizer through DME? In the meantime she does have her rescue albuterol inhaler      Thanks

## 2023-04-03 NOTE — TELEPHONE ENCOUNTER
Patient calling saying that since Saturday it has been hurting her to take a deep breath and her chest feels heavy  She also states that she has been using the rescue inhaler a lot more frequently  Please advise

## 2023-04-03 NOTE — TELEPHONE ENCOUNTER
Tena Panda would like a return call regarding     What is the reason for the call/chief complaint? Pt c/o trouble breathing, having frequent asthma attacks, using rescue inhaler more frequently  She is also saying it hurts on both side of rib cage when taking a breath, chest is heavy/feels like someone is sitting on chest, peak flow normally 400 & now she can't get it above 250  Can't use stairs without stopping to catch breath, dry cough, taking ibuprofen for rib cage pain- not helping  What additional symptoms are present or absent? SOB, yes   Are they on O2? No   chest pain/tightness, yes  wheezing, no  Cough, yes  mucous production,no  fevers/chills, no  weight gain, no  Swelling no  Pain yes rib cage pain    When did they start/how long have they been going on? Started Saturday    Have you been exposed to anyone that is sick? No    Have you been tested for COVID recently? no    Check current pulmonary medications Using Symbicort, Singulair and albuterol     Have they had any other treatment or testing for this problem elsewhere? no    Recent steroids? No    Recent Antibiotics?  No     Last office visit? telemed vist with dr Yuniel Vivar on 2/27 - has follow up scheduled 4/26

## 2023-04-26 ENCOUNTER — OFFICE VISIT (OUTPATIENT)
Dept: PULMONOLOGY | Facility: CLINIC | Age: 33
End: 2023-04-26

## 2023-04-26 VITALS
HEIGHT: 65 IN | HEART RATE: 81 BPM | TEMPERATURE: 97.8 F | DIASTOLIC BLOOD PRESSURE: 70 MMHG | WEIGHT: 154 LBS | SYSTOLIC BLOOD PRESSURE: 114 MMHG | BODY MASS INDEX: 25.66 KG/M2 | OXYGEN SATURATION: 98 %

## 2023-04-26 DIAGNOSIS — J45.50 SEVERE PERSISTENT ASTHMA WITHOUT COMPLICATION: Primary | ICD-10-CM

## 2023-04-26 RX ORDER — EPINEPHRINE 0.3 MG/.3ML
0.3 INJECTION SUBCUTANEOUS ONCE
Qty: 0.6 ML | Refills: 0 | Status: SHIPPED | OUTPATIENT
Start: 2023-04-26 | End: 2023-04-26

## 2023-04-26 RX ORDER — TIOTROPIUM BROMIDE INHALATION SPRAY 1.56 UG/1
2 SPRAY, METERED RESPIRATORY (INHALATION) DAILY
Qty: 4 G | Refills: 0 | Status: SHIPPED | COMMUNITY
Start: 2023-04-26 | End: 2023-05-24

## 2023-04-26 RX ORDER — TEZEPELUMAB-EKKO 210 MG/1.9ML
210 INJECTION, SOLUTION SUBCUTANEOUS
Qty: 1.91 ML | Refills: 11 | Status: SHIPPED | OUTPATIENT
Start: 2023-04-26 | End: 2023-04-27

## 2023-04-26 NOTE — PROGRESS NOTES
Pulmonary Outpatient Note   Jeremy Mabry 28 y o  female MRN: [id number]  4/26/2023      Reason for Consultation:    Chief Complaint   Patient presents with   • Asthma         Assessment/Plan:    1  Severe persistent asthma without complication  Assessment & Plan:  Triggers- dust, dryer lint, cardio exercise, pollen  Improved on Symbicort 160-4 5 mcg 2 puffs BID and singulair 10 mg  But has had 2 exacerbations in last 6 months and currently has dyspnea with doing activities such as walking a mile with chest tightness  Her [place] RAST panel was negative, IgE was <10  Her absolute eos were 280 on recent CBC w/ differential      Plan  Continue Symbicort 160-4 5 mcg 2 puffs BID with PRN albuterol  Continue singulair 10 mg daily  Add spiriva respimat 1 25 mcg 2 puffs daily- provided sample and reviewed inhaler technique  If this provides relief she will let me know and I will prescribe it  Prescribe tezpire 210 mg q28 days for severe persistent asthma with exacerbations despite use of high-dose ICS with LABA and singulair  She has peak-flow meter  Encourage continued monitoring  RTC 2 months    Orders:  -     Tezepelumab-ekko (Tezspire) 210 MG/1  91ML SOAJ; Inject 210 mg under the skin every 28 days  -     EPINEPHrine (EPIPEN) 0 3 mg/0 3 mL SOAJ; Inject 0 3 mL (0 3 mg total) into a muscle once for 1 dose  -     tiotropium (Spiriva Respimat) 1 25 MCG/ACT AERS inhaler;  Inhale 2 puffs daily for 28 days        Health Maintenance  Immunization History   Administered Date(s) Administered   • COVID-19 PFIZER VACCINE 0 3 ML IM 04/26/2021, 05/17/2021   • COVID-19 Pfizer vac (Chilo-sucrose, gray cap) 12 yr+ IM 01/14/2022   • INFLUENZA 11/27/2017   • Influenza Quadrivalent, 6-35 Months IM 11/27/2017   • Influenza, injectable, quadrivalent, preservative free 0 5 mL 11/06/2019, 11/16/2021   • Influenza, recombinant, quadrivalent,injectable, preservative free 01/13/2021, 12/05/2022   • Tdap 11/16/2021          Return in about 2 months (around 6/26/2023)  History of Present Illness   HPI:  Maria Del Carmen Almeida is a 28 y o  female who has asthma, anxiety, PTSD, depression who follows-up for asthma  Sick call in early April- prescribed prednisone and nebulizer  Last seen by Telehealth in February  Was on Symbicort 160-4 5, singulair  Had an exacerbation before that visit as well requiring an ED visit  Now back to baseline- albuterol around twice around twice a week  Not using at night  She has noticed sneezing outside, pollen irritation  She has some chest tightness, sometimes wheezes, coughs frequently  Some shortness of breath  Has been on prednisone twice since December  Tried to walk a mile yesterday and could not  No heartburn        Review of Systems   Constitutional: Negative for appetite change, chills and fever  HENT: Positive for sneezing  Negative for ear pain, postnasal drip, rhinorrhea, sore throat and trouble swallowing  Eyes: Negative for pain and visual disturbance  Respiratory: Positive for cough, shortness of breath and wheezing  Cardiovascular: Negative for chest pain and palpitations  Gastrointestinal: Negative for abdominal pain and vomiting  Genitourinary: Negative for dysuria and hematuria  Musculoskeletal: Negative for arthralgias, back pain and myalgias  Skin: Negative for color change and rash  Neurological: Negative for seizures, syncope and headaches  All other systems reviewed and are negative  Historical Information   Past Medical History:   Diagnosis Date   • Anxiety    • Depression    • Pilonidal cyst with abscess    • Seasonal allergies    • Varicella    • Wears glasses      Past Surgical History:   Procedure Laterality Date   • CARDIAC CATHETERIZATION  8/29/2022    Procedure: Cardiac catheterization;  Surgeon: Michael Storm MD;  [facility];   Service: Cardiology   • CARDIAC CATHETERIZATION N/A 8/29/2022    Procedure: Cardiac Coronary Angiogram;  Surgeon: Paulie Baez MD;  [facility]; Service: Cardiology   • CHOLECYSTECTOMY  08/2010    Laparoscopic   • COLONOSCOPY N/A 5/9/2018    Procedure: COLONOSCOPY;  Surgeon: Mary Gudino MD;  Location: [facility];   Service: Gastroenterology   • [place] EXCISION PILONIDAL CYST/SINUS SIMPLE N/A 4/28/2016    Procedure: EXCISION PILONIDAL CYST;  Surgeon: Cecilia Hinson MD;  [facility];  Service: General   • [place] LAPAROSCOPY W/RMVL ADNEXAL STRUCTURES Bilateral 10/27/2016    Procedure: SALPINGECTOMY;  Surgeon: Clementine Welsh MD;  [facility];  Service: Gynecology   • SALPINGECTOMY     • WISDOM TOOTH EXTRACTION  01/2016    right upper only     Family History   Problem Relation Age of Onset   • Seizures Mother    • Other Mother         Epilepsy   • Mental illness Mother    • Dementia Mother    • Schizophrenia Mother    • Suicide Attempts Mother    • Dysrhythmia Father    • Hypertension Father    • Stroke Father    • Other Father         Cardiac disorder, cardiac pacemaker    • Heart disease Father    • Coronary artery disease Paternal Grandfather    • Cancer Family              Meds/Allergies     Current Outpatient Medications:   •  albuterol (ACCUNEB) 1 25 MG/3ML nebulizer solution, Take 3 mL (1 25 mg total) by nebulization every 6 (six) hours as needed for wheezing, Disp: 60 mL, Rfl: 3  •  albuterol (PROVENTIL HFA,VENTOLIN HFA) 90 mcg/act inhaler, Inhale 2 puffs every 4 (four) hours as needed for wheezing, Disp: 18 g, Rfl: 4  •  ARIPiprazole (ABILIFY) 5 mg tablet, Take 1 tablet (5 mg total) by mouth daily at bedtime, Disp: 30 tablet, Rfl: 1  •  citalopram (CeleXA) 20 mg tablet, take 1 and 1/2 tablets by mouth daily, Disp: 45 tablet, Rfl: 0  •  EPINEPHrine (EPIPEN) 0 3 mg/0 3 mL SOAJ, Inject 0 3 mL (0 3 mg total) into a muscle once for 1 dose, Disp: 0 6 mL, Rfl: 0  •  hydrOXYzine HCL (ATARAX) 25 mg tablet, , Disp: , Rfl:   •  montelukast (SINGULAIR) 10 mg tablet, Take 1 tablet (10 mg total) by mouth daily at bedtime, Disp: 30 tablet, Rfl: 5  •  Multiple Vitamin (MULTIVITAMIN) tablet, Take 1 tablet by mouth daily  , Disp: , Rfl:   •  prazosin (MINIPRESS) 5 mg capsule, Take 10 mg by mouth, Disp: , Rfl:   •  Symbicort 160-4 5 MCG/ACT inhaler, INHALE TWO PUFFS BY MOUTH TWICE DAILY  RINSE MOUTH AFTER USE , Disp: 10 2 g, Rfl: 11  •  Tezepelumab-ekko (Tezspire) 210 MG/1  91ML SOAJ, Inject 210 mg under the skin every 28 days, Disp: 1 91 mL, Rfl: 11  •  tiotropium (Spiriva Respimat) 1 25 MCG/ACT AERS inhaler, Inhale 2 puffs daily for 28 days, Disp: 4 g, Rfl: 0  Allergies   Allergen Reactions   • Gluten Meal - Food Allergy        Physical Exam  Vitals and nursing note reviewed  Constitutional:       General: She is not in acute distress  Appearance: She is well-developed  HENT:      Head: Normocephalic and atraumatic  Mouth/Throat:      Mouth: Mucous membranes are moist       Pharynx: No oropharyngeal exudate  Eyes:      Conjunctiva/sclera: Conjunctivae normal    Cardiovascular:      Rate and Rhythm: Normal rate and regular rhythm  Heart sounds: No murmur heard  Pulmonary:      Effort: Pulmonary effort is normal  No respiratory distress  Breath sounds: Normal breath sounds  Abdominal:      Palpations: Abdomen is soft  Tenderness: There is no abdominal tenderness  Musculoskeletal:         General: No swelling  Cervical back: Neck supple  Skin:     General: Skin is warm and dry  Capillary Refill: Capillary refill takes less than 2 seconds  Neurological:      Mental Status: She is alert  Psychiatric:         Mood and Affect: Mood normal          Labs: I have personally reviewed pertinent lab results      ABG:   Lab Results   Component Value Date    SOURCE None given 08/17/2022   ,   BNP: No results found for: BNP,   CBC:  Lab Results   Component Value Date    WBC 7 61 02/28/2023    HGB 14 9 02/28/2023    HCT 45 1 02/28/2023    MCV 92 02/28/2023  02/28/2023    EOSPCT 4 02/28/2023    EOSABS 0 28 02/28/2023    NEUTOPHILPCT 60 02/28/2023    LYMPHOPCT 26 02/28/2023   ,   CMP:   Lab Results   Component Value Date    SODIUM 140 12/03/2022    K 3 7 12/03/2022     12/03/2022    CO2 27 12/03/2022    BUN 9 12/03/2022    CREATININE 0 81 12/03/2022    CALCIUM 8 7 12/03/2022    AST 17 08/23/2022    ALT 21 08/23/2022    ALKPHOS 61 08/23/2022    PROT 7 4 01/20/2018    BILITOT 0 7 01/20/2018    EGFR 96 12/03/2022   ,   PT/INR: No results found for: PT, INR,   Troponin: No results found for: TROPONINI    Component      Latest Ref Rng & Units 2/28/2023   ALTERNARIA ALTERNATA      0 00 - 0 09 kUA/I <0 10   ASPERGILLUS FUMIGATUS      0 00 - 0 09 kUA/I <0 10   BERMUDA GRASS      0 00 - 0 09 kUA/I <0 10   ALLERGEN MAPLE/BOX ELDER      0 00 - 0 09 kUA/I <0 10   ALLERGEN CAT EPITHELIUM-DANDER      0 00 - 0 09 kUA/I <0 10   CLADOSPORIUM HERBARUM      0 00 - 0 09 kUA/I <0 10   COCKROACH      0 00 - 0 09 kUA/I <0 10   ALLERGEN, COMMON SILVER BIRCH      0 00 - 0 09 kUA/I <0 10   ALLERGEN, COTTONWOOD      0 00 - 0 09 kUA/I <0 10   D  FARINAE      0 00 - 0 09 kUA/I <0 10   D   PTERONYSSINUS      0 00 - 0 09 kUA/I <0 10   DOG DANDER      0 00 - 0 09 kUA/I <0 10   ALLERGEN ELM      0 00 - 0 09 kUA/I <0 10   [place] TREE      0 00 - 0 09 kUA/I <0 10   ALLERGEN MUGWORT IGE      0 00 - 0 09 kUA/I <0 10   MULBERRY TREE      0 00 - 0 09 kUA/I <0 10   ALLERGEN, OAK      0 00 - 0 09 kUA/I <0 10   PENICILLIUM CHRYSOGENUM      0 00 - 0 09 kUA/I <0 10   ALLERGEN ROUGH PIGWEED (W14) IGE      0 00 - 0 09 kUA/I <0 10   COMMON RAGWEED      0 00 - 0 09 kUA/I <0 10   ALLERGEN SHEEP SORREL (W18) IGE      0 00 - 0 09 kUA/I <0 10   [place] TREE      0 00 - 0 09 kUA/I <0 10   DECLAN GRASS      0 00 - 0 09 kUA/I <0 10   [place] TREE      0 00 - 0 09 kUA/I <0 10   WHITE [place]A TREE      0 00 - 0 09 kUA/I <0 10   TOTAL IGE      0 - 113 kU/l 4 14   MOUSE URINE      0 00 - 0 09 kUA/I <0 10 Imaging and other studies: I have personally reviewed pertinent reports  and I have personally reviewed pertinent films in PACS    CXR 12/3/22  Lungs clear    CXR 1/25/23 at [facility] report only  Lungs clear      Pulmonary function testing:   Spirometry:  FEV1/FVC Ratio is 34 %  FEV1 is 1 15 L/32 % of predicted  FVC is 3 35 L/79 % of predicted  There is significant bronchodilator response     Lung volumes:  RV 1 18 L/74 % of predicted, TLC 4 70 L/84 % of predicted, RV/TLC ratio 25 %     Diffusing capacity:  93 % of predicted     IMPRESSION:  1  Moderate obstructive air flow limitation with normal vital capacity based on post bronchodilator spirometry  2  Significant bronchodilator response  3  Normal lung volumes  4  Normal diffusion capacity  5  Abnormal flow volume lobe consistent with obstruction     EKG, Pathology, and Other Studies: I have personally reviewed pertinent reports  [facility]

## 2023-04-26 NOTE — ASSESSMENT & PLAN NOTE
Triggers- dust, dryer lint, cardio exercise, pollen  Improved on Symbicort 160-4 5 mcg 2 puffs BID and singulair 10 mg  But has had 2 exacerbations in last 6 months and currently has dyspnea with doing activities such as walking a mile with chest tightness  Her [place] RAST panel was negative, IgE was <10  Her absolute eos were 280 on recent CBC w/ differential      Plan  Continue Symbicort 160-4 5 mcg 2 puffs BID with PRN albuterol  Continue singulair 10 mg daily  Add spiriva respimat 1 25 mcg 2 puffs daily- provided sample and reviewed inhaler technique  If this provides relief she will let me know and I will prescribe it  Prescribe tezpire 210 mg q28 days for severe persistent asthma with exacerbations despite use of high-dose ICS with LABA and singulair  She has peak-flow meter  Encourage continued monitoring      RTC 2 months

## 2023-04-27 ENCOUNTER — TELEPHONE (OUTPATIENT)
Dept: PULMONOLOGY | Facility: CLINIC | Age: 33
End: 2023-04-27

## 2023-04-27 DIAGNOSIS — J45.50 SEVERE PERSISTENT ASTHMA WITHOUT COMPLICATION: Primary | ICD-10-CM

## 2023-04-27 NOTE — TELEPHONE ENCOUNTER
I have received a denial for Tezspire injections  Insurance is stating that the patient needs to try Rosealee Prior or Xolair first before the Kef [place]  Please let me know if you would like to order one of these options       Thanks

## 2023-04-28 NOTE — TELEPHONE ENCOUNTER
nucala is not on the preferred list       Xolair, dupixent or Praveen Alfonso are all on the preferred list

## 2023-05-01 ENCOUNTER — TREATMENT (OUTPATIENT)
Dept: PULMONOLOGY | Facility: CLINIC | Age: 33
End: 2023-05-01

## 2023-05-01 DIAGNOSIS — J45.50 SEVERE PERSISTENT ASTHMA WITHOUT COMPLICATION: Primary | ICD-10-CM

## 2023-05-01 RX ORDER — EPINEPHRINE 1 MG/ML
0.3 INJECTION, SOLUTION, CONCENTRATE INTRAVENOUS
OUTPATIENT
Start: 2023-05-08

## 2023-05-01 NOTE — TELEPHONE ENCOUNTER
I called and lvm for pt advising her of the Denial of Tezspire and the chosen alternative of Fasenra  I asked that she call my direct line back to go over this and set up her injection appt for the [facility]

## 2023-05-25 DIAGNOSIS — J45.50 SEVERE PERSISTENT ASTHMA WITHOUT COMPLICATION: ICD-10-CM

## 2023-05-25 RX ORDER — TIOTROPIUM BROMIDE INHALATION SPRAY 1.56 UG/1
2 SPRAY, METERED RESPIRATORY (INHALATION) DAILY
Qty: 4 G | Refills: 5 | Status: SHIPPED | OUTPATIENT
Start: 2023-05-25 | End: 2023-06-22

## 2023-05-25 NOTE — TELEPHONE ENCOUNTER
Patient called stating that the Spiriva inhaler sample that she was given works really well for her  She would like if the doctor can send in a script to the [address] [place] [address]  Please advise

## 2023-06-06 ENCOUNTER — APPOINTMENT (OUTPATIENT)
Dept: RADIOLOGY | Facility: CLINIC | Age: 33
End: 2023-06-06
Payer: COMMERCIAL

## 2023-06-06 ENCOUNTER — OFFICE VISIT (OUTPATIENT)
Dept: FAMILY MEDICINE CLINIC | Facility: CLINIC | Age: 33
End: 2023-06-06
Payer: COMMERCIAL

## 2023-06-06 VITALS
RESPIRATION RATE: 16 BRPM | WEIGHT: 151 LBS | HEART RATE: 72 BPM | DIASTOLIC BLOOD PRESSURE: 64 MMHG | BODY MASS INDEX: 25.16 KG/M2 | HEIGHT: 65 IN | SYSTOLIC BLOOD PRESSURE: 100 MMHG | TEMPERATURE: 99 F | OXYGEN SATURATION: 99 %

## 2023-06-06 DIAGNOSIS — N64.4 PAINFUL LUMPY LEFT BREAST: Primary | ICD-10-CM

## 2023-06-06 DIAGNOSIS — N63.20 PAINFUL LUMPY LEFT BREAST: Primary | ICD-10-CM

## 2023-06-06 DIAGNOSIS — R07.89 PAIN OF STERNUM: ICD-10-CM

## 2023-06-06 PROCEDURE — 71046 X-RAY EXAM CHEST 2 VIEWS: CPT

## 2023-06-06 PROCEDURE — 99214 OFFICE O/P EST MOD 30 MIN: CPT | Performed by: FAMILY MEDICINE

## 2023-06-06 NOTE — ASSESSMENT & PLAN NOTE
Patient with pain of anterior left chest   Suspect breast pain due to lump  However, we will obtain chest Xray to rule out bony abnormality or other causes

## 2023-06-06 NOTE — PROGRESS NOTES
Assessment/Plan:    1  Painful lumpy left breast  Assessment & Plan:  Patient with pain in her left inner breast that has been worsening  She also has lump that is newly found and tender  We will order breast US to evaluate for any abnormality  Patient may take ibuprofen as needed for pain relief  Monitor for worsening symptoms and follow up after imaging  Orders:  -     US breast left limited (diagnostic); Future; Expected date: 06/06/2023    2  Pain of sternum  Assessment & Plan:  Patient with pain of anterior left chest   Suspect breast pain due to lump  However, we will obtain chest Xray to rule out bony abnormality or other causes  Orders:  -     XR chest pa & lateral; Future; Expected date: 06/06/2023      Subjective:      Patient ID: James Moore is a 28 y o  female  HPI    Patient presenting with pain in her sternum for the past 3 days  She has history of asthma but denies any recent respiratory illness or asthma exacerbation  There has been no trauma or injury to the chest   She feels the pain in her left chest along her sternum and inner breast   Pain is noticeable with deep inhalation but does not worsen with breathing or coughing  It started being intermittent and felt like an electric shock that was fleeting  The pain now feels like a throbbing more persistent pain and she has had to take ibuprofen to keep the pain mild  Pain is rated 4/10 today  She feels a lump along her left chest that is tender to touch  She did have cardiac workup a few months ago which was normal   Denies wheezing or shortness of breath  No visible skin changes  Denies known family history of breast cancer       The following portions of the patient's history were reviewed and updated as appropriate: allergies, current medications, past family history, past medical history, past social history, past surgical history, and problem list       Current Outpatient Medications:   •  albuterol (PROVENTIL HFA,VENTOLIN HFA) 90 mcg/act inhaler, Inhale 2 puffs every 4 (four) hours as needed for wheezing, Disp: 18 g, Rfl: 4  •  ARIPiprazole (ABILIFY) 5 mg tablet, Take 1 tablet (5 mg total) by mouth daily at bedtime, Disp: 30 tablet, Rfl: 1  •  benralizumab (FASENRA) subcutaneous injection, Inject 1 mL (30 mg total) under the skin every 28 days, Disp: 1 mL, Rfl: 2  •  citalopram (CeleXA) 20 mg tablet, take 1 and 1/2 tablets by mouth daily, Disp: 45 tablet, Rfl: 0  •  hydrOXYzine HCL (ATARAX) 25 mg tablet, , Disp: , Rfl:   •  montelukast (SINGULAIR) 10 mg tablet, Take 1 tablet (10 mg total) by mouth daily at bedtime, Disp: 30 tablet, Rfl: 5  •  Multiple Vitamin (MULTIVITAMIN) tablet, Take 1 tablet by mouth daily  , Disp: , Rfl:   •  Symbicort 160-4 5 MCG/ACT inhaler, INHALE TWO PUFFS BY MOUTH TWICE DAILY  RINSE MOUTH AFTER USE , Disp: 10 2 g, Rfl: 11  •  tiotropium (Spiriva Respimat) 1 25 MCG/ACT AERS inhaler, Inhale 2 puffs daily for 28 days, Disp: 4 g, Rfl: 5  •  benralizumab (FASENRA) subcutaneous injection, Inject 1 mL (30 mg total) under the skin every 56 days (Patient not taking: Reported on 6/6/2023), Disp: 1 mL, Rfl: 5  •  EPINEPHrine (EPIPEN) 0 3 mg/0 3 mL SOAJ, Inject 0 3 mL (0 3 mg total) into a muscle once for 1 dose, Disp: 0 6 mL, Rfl: 0  •  prazosin (MINIPRESS) 5 mg capsule, Take 10 mg by mouth (Patient not taking: Reported on 6/6/2023), Disp: , Rfl:       Review of Systems   Constitutional: Negative for chills and fever  HENT: Negative for ear pain and sore throat  Eyes: Negative for pain and visual disturbance  Respiratory: Negative for cough and shortness of breath  Cardiovascular: Negative for chest pain and palpitations  Gastrointestinal: Negative for abdominal pain and vomiting  Genitourinary: Negative for dysuria and hematuria  Musculoskeletal: Negative for arthralgias and back pain  Left chest wall pain   Skin: Negative for color change and rash     Neurological: Negative for "seizures and syncope  All other systems reviewed and are negative  Objective:      /64   Pulse 72   Temp 99 °F (37 2 °C) (Temporal)   Resp 16   Ht 5' 5\" (1 651 m)   Wt 68 5 kg (151 lb)   LMP 05/24/2023 (Approximate)   SpO2 99%   BMI 25 13 kg/m²          Physical Exam  Vitals and nursing note reviewed  Constitutional:       General: She is not in acute distress  Appearance: Normal appearance  She is not toxic-appearing  HENT:      Head: Normocephalic and atraumatic  Eyes:      Extraocular Movements: Extraocular movements intact  Conjunctiva/sclera: Conjunctivae normal       Pupils: Pupils are equal, round, and reactive to light  Cardiovascular:      Rate and Rhythm: Normal rate and regular rhythm  Heart sounds: Normal heart sounds  No murmur heard  Pulmonary:      Effort: Pulmonary effort is normal  No respiratory distress  Breath sounds: Normal breath sounds  No wheezing  Chest:      Chest wall: No deformity, tenderness or crepitus  Breasts:     Right: Normal  No swelling, mass, nipple discharge, skin change or tenderness  Left: Mass and tenderness present  No swelling  Musculoskeletal:      Cervical back: Normal range of motion  Lymphadenopathy:      Upper Body:      Right upper body: No axillary adenopathy  Left upper body: No axillary adenopathy  Skin:     General: Skin is warm  Neurological:      General: No focal deficit present  Mental Status: She is alert and oriented to person, place, and time  Mental status is at baseline  Psychiatric:         Mood and Affect: Mood normal          Behavior: Behavior normal          Thought Content:  Thought content normal          Judgment: Judgment normal          "

## 2023-06-06 NOTE — ASSESSMENT & PLAN NOTE
Patient with pain in her left inner breast that has been worsening  She also has lump that is newly found and tender  We will order breast US to evaluate for any abnormality  Patient may take ibuprofen as needed for pain relief  Monitor for worsening symptoms and follow up after imaging

## 2023-06-09 ENCOUNTER — TELEPHONE (OUTPATIENT)
Dept: FAMILY MEDICINE CLINIC | Facility: CLINIC | Age: 33
End: 2023-06-09

## 2023-06-09 NOTE — TELEPHONE ENCOUNTER
"\"Carlos, this is Teoelena Clineost  My date of birth is [birth year] [de-[birth year]] [birth year] [de-identified]  My phone number is [phone] one five  Again that number is [phone] one five  I was calling because I saw doctor [facility] Radha sixth  I believe that was a Tuesday for breast pain and she had found a lump and told me to keep her updated on my situation  The breast center is only able to get me in until next week  Wasn't able to get me in until next week  Next Tuesday on the thirteenth I was calling because I have found another lump  The pain in my left breast is getting worse despite using ibuprofen and Tylenol  So I was calling for advice on what I need to do  I also have loss of appetite and when I do eat I feel very nauseated  I haven't actually thrown up, but I feel like I need to  So I was calling to update her on my situation and just ask for her advice on what I need to do next while I wait to get into the breast center  Thank you and have a nice day  \"      Pt reporting a new lump has appeared and she is nauseated with loss of appetite  Worsening pain  I advised urgent care/ER if needed over the weekend if she needs medications to control her symptoms or if she develops fevers  If provider would like to change priority of the test, we can get her in sooner    "

## 2023-06-12 NOTE — TELEPHONE ENCOUNTER
Message received on 6/12  Please call patient today and follow up on how she is doing since the weekend  Her appointment with radiology is tomorrow    Thank you

## 2023-06-12 NOTE — TELEPHONE ENCOUNTER
She went to [facility] ER  They gave her nausea medication and IV fluids, sent home   She'll be going to for tests tomorrow as scheduled

## 2023-06-13 ENCOUNTER — HOSPITAL ENCOUNTER (OUTPATIENT)
Dept: ULTRASOUND IMAGING | Facility: CLINIC | Age: 33
Discharge: HOME | End: 2023-06-13
Payer: COMMERCIAL

## 2023-06-13 ENCOUNTER — HOSPITAL ENCOUNTER (OUTPATIENT)
Dept: MAMMOGRAPHY | Facility: CLINIC | Age: 33
Discharge: HOME | End: 2023-06-13
Payer: COMMERCIAL

## 2023-06-13 VITALS — BODY MASS INDEX: 25.16 KG/M2 | WEIGHT: 151 LBS | HEIGHT: 65 IN

## 2023-06-13 DIAGNOSIS — N63.20 LUMP OF LEFT BREAST: ICD-10-CM

## 2023-06-13 DIAGNOSIS — N63.20 PAINFUL LUMPY LEFT BREAST: ICD-10-CM

## 2023-06-13 DIAGNOSIS — N64.4 PAINFUL LUMPY LEFT BREAST: ICD-10-CM

## 2023-06-13 PROCEDURE — 76642 ULTRASOUND BREAST LIMITED: CPT

## 2023-06-13 PROCEDURE — G0279 TOMOSYNTHESIS, MAMMO: HCPCS

## 2023-06-13 PROCEDURE — 77066 DX MAMMO INCL CAD BI: CPT

## 2023-07-11 ENCOUNTER — OFFICE VISIT (OUTPATIENT)
Dept: PULMONOLOGY | Facility: CLINIC | Age: 33
End: 2023-07-11
Payer: COMMERCIAL

## 2023-07-11 VITALS
HEART RATE: 92 BPM | DIASTOLIC BLOOD PRESSURE: 72 MMHG | TEMPERATURE: 98.6 F | SYSTOLIC BLOOD PRESSURE: 110 MMHG | BODY MASS INDEX: 25.16 KG/M2 | HEIGHT: 65 IN | OXYGEN SATURATION: 98 % | WEIGHT: 151 LBS

## 2023-07-11 DIAGNOSIS — J45.40 MODERATE PERSISTENT ASTHMA WITHOUT COMPLICATION: Primary | ICD-10-CM

## 2023-07-11 PROCEDURE — 99213 OFFICE O/P EST LOW 20 MIN: CPT | Performed by: INTERNAL MEDICINE

## 2023-07-11 NOTE — ASSESSMENT & PLAN NOTE
Triggers- dust, dryer lint, cardio exercise, pollen    Symptoms now much improved- only used albuterol twice in last couple of months. Her NE RAST panel was negative, IgE was <10. Her absolute eos were 280 on recent CBC w/ differential early 2023. Plan  Continue Symbicort 160-4.5 mcg 2 puffs BID with PRN albuterol  Continue spiriva respimat 1.25 mcg 2 puffs daily- this has provided significant relief    Continue singulair 10 mg    Continue to monitor peak-flow values    RTC 6 months- if she remains completely controlled in the interim I asked her to stop singulair in 2-3 months to try and step down therapy.

## 2023-07-11 NOTE — PATIENT INSTRUCTIONS
If in 3 months you are not using albuterol at all. Feel free to stop singulair and see if you develop symptoms- if you do, resume, if not- stay off it    Otherwise continue same inhalers.

## 2023-07-11 NOTE — PROGRESS NOTES
Pulmonary Outpatient Note   Samantha Mcclelland 35 y.o. female MRN: [id number]  7/11/2023      Reason for Consultation:    Chief Complaint   Patient presents with   • Asthma         Assessment/Plan:    1. Moderate persistent asthma without complication  Assessment & Plan:  Triggers- dust, dryer lint, cardio exercise, pollen    Symptoms now much improved- only used albuterol twice in last couple of months. Her NE RAST panel was negative, IgE was <10. Her absolute eos were 280 on recent CBC w/ differential early 2023. Plan  Continue Symbicort 160-4.5 mcg 2 puffs BID with PRN albuterol  Continue spiriva respimat 1.25 mcg 2 puffs daily- this has provided significant relief    Continue singulair 10 mg    Continue to monitor peak-flow values    RTC 6 months- if she remains completely controlled in the interim I asked her to stop singulair in 2-3 months to try and step down therapy. Health Maintenance  Immunization History   Administered Date(s) Administered   • COVID-19 PFIZER VACCINE 0.3 ML IM 04/26/2021, 05/17/2021   • COVID-19 Pfizer vac (Chilo-sucrose, gray cap) 12 yr+ IM 01/14/2022   • INFLUENZA 11/27/2017   • Influenza Quadrivalent, 6-35 Months IM 11/27/2017   • Influenza, injectable, quadrivalent, preservative free 0.5 mL 11/06/2019, 11/16/2021   • Influenza, recombinant, quadrivalent,injectable, preservative free 01/13/2021, 12/05/2022   • Tdap 11/16/2021          Return in about 6 months (around 1/11/2024). History of Present Illness   HPI:  Samantha Mcclelland is a 35 y.o. female who has asthma, anxiety, PTSD, depression who follows-up for asthma. Seen in April- uncontrolled symptoms on symbicort 160-4.5 and singulair. Added Spiriva which improved her symptoms  Had been ordered Mari Fleming- patient elected to hold on this with improvement in Spiriva. Today she reports she has had only needed to use the albuterol twice.   She reports major improvement with Spiriva - went from Albuterol 3-4 x a week- to only twice in the last couple of months. Triggers remain dust from vacuuming. Normal peak flow is 425- uses this to guide her treatment. For example her peak flow was 200 2 days ago and used albuterol with improvement. She can now walk 2 miles with no issues. She can play with her kids. She is tired at 2 miles- not short of breath. She exercises- light weights- 15 lb kettlebells, 10 lb dumbbells, squats. cough: Answers for HPI/ROS submitted by the patient on 7/10/2023  Chronicity: recurrent  When did you first notice your symptoms?: in the past 7 days  How often do your symptoms occur?: 2 to 4 times per day  Since you first noticed this problem, how has it changed?: gradually improving  Do you have shortness of breath that occurs with effort or exertion?: Yes  Do you have ear congestion?: No  Do you have heartburn?: No  Do you have fatigue?: Yes  Do you have nasal congestion?: No  Do you have shortness of breath when lying flat?: No  Do you have shortness of breath when you wake up?: No  Do you have sweats?: No  Have you experienced weight loss?: No  Which of the following makes your symptoms worse?: exercise  Which of the following makes your symptoms better?: OTC inhaler, rest       Review of Systems   Constitutional: Negative for appetite change, chills and fever. HENT: Negative for ear pain, postnasal drip, rhinorrhea, sneezing, sore throat and trouble swallowing. Eyes: Negative for pain and visual disturbance. Respiratory: Positive for cough. Negative for shortness of breath. Cardiovascular: Negative for chest pain and palpitations. Gastrointestinal: Negative for abdominal pain and vomiting. Genitourinary: Negative for dysuria and hematuria. Musculoskeletal: Negative for arthralgias, back pain and myalgias. Skin: Negative for color change and rash. Neurological: Negative for seizures, syncope and headaches.    All other systems reviewed and are negative. Historical Information   Past Medical History:   Diagnosis Date   • Anxiety    • Depression    • Pilonidal cyst with abscess    • Seasonal allergies    • Varicella    • Wears glasses      Past Surgical History:   Procedure Laterality Date   • CARDIAC CATHETERIZATION  8/29/2022    Procedure: Cardiac catheterization;  Surgeon: Cathy Guevara MD;  Location: [facility]; Service: Cardiology   • CARDIAC CATHETERIZATION N/A 8/29/2022    Procedure: Cardiac Coronary Angiogram;  Surgeon: Cathy Guevara MD;  Location: [facility]; Service: Cardiology   • CHOLECYSTECTOMY  08/2010    Laparoscopic   • COLONOSCOPY N/A 5/9/2018    Procedure: COLONOSCOPY;  Surgeon: Ramirez eDe MD;  Location: [facility];   Service: Gastroenterology   • WY EXCISION PILONIDAL CYST/SINUS SIMPLE N/A 4/28/2016    Procedure: EXCISION PILONIDAL CYST;  Surgeon: Michael Higuera MD;  Location: [facility];  Service: General   • WY LAPAROSCOPY W/RMVL ADNEXAL STRUCTURES Bilateral 10/27/2016    Procedure: SALPINGECTOMY;  Surgeon: Jareth Stanley MD;  Location: [facility];  Service: Gynecology   • SALPINGECTOMY     • WISDOM TOOTH EXTRACTION  01/2016    right upper only     Family History   Problem Relation Age of Onset   • Seizures Mother    • Other Mother         Epilepsy   • Mental illness Mother    • Dementia Mother    • Schizophrenia Mother    • Suicide Attempts Mother    • Dysrhythmia Father    • Hypertension Father    • Stroke Father    • Other Father         Cardiac disorder, cardiac pacemaker    • Heart disease Father    • Coronary artery disease Paternal Grandfather    • Cancer Family    • Heart disease Brother              Meds/Allergies     Current Outpatient Medications:   •  albuterol (PROVENTIL HFA,VENTOLIN HFA) 90 mcg/act inhaler, Inhale 2 puffs every 4 (four) hours as needed for wheezing, Disp: 18 g, Rfl: 4  •  ARIPiprazole (ABILIFY) 5 mg tablet, Take 1 tablet (5 mg total) by mouth daily at bedtime, Disp: 30 tablet, Rfl: 1  •  citalopram (CeleXA) 20 mg tablet, take 1 and 1/2 tablets by mouth daily, Disp: 45 tablet, Rfl: 0  •  hydrOXYzine HCL (ATARAX) 25 mg tablet, , Disp: , Rfl:   •  montelukast (SINGULAIR) 10 mg tablet, Take 1 tablet (10 mg total) by mouth daily at bedtime, Disp: 30 tablet, Rfl: 5  •  Multiple Vitamin (MULTIVITAMIN) tablet, Take 1 tablet by mouth daily. , Disp: , Rfl:   •  Symbicort 160-4.5 MCG/ACT inhaler, INHALE TWO PUFFS BY MOUTH TWICE DAILY. RINSE MOUTH AFTER USE., Disp: 10.2 g, Rfl: 11  •  tiotropium (Spiriva Respimat) 1.25 MCG/ACT AERS inhaler, Inhale 2 puffs daily for 28 days, Disp: 4 g, Rfl: 5  •  EPINEPHrine (EPIPEN) 0.3 mg/0.3 mL SOAJ, Inject 0.3 mL (0.3 mg total) into a muscle once for 1 dose, Disp: 0.6 mL, Rfl: 0  Allergies   Allergen Reactions   • Gluten Meal - Food Allergy       7/11/23 6/13/23 6/6/23   Blood Pressure 110/72 -- 100/64   Pulse 92 -- 72   Respirations -- -- 16   Temperature 98.6 °F (37 °C) -- 99 °F (37.2 °C)   Temp Source Tympanic -- Temporal   SpO2 98 % -- 99 %   Weight - Scale 68.5 kg (151 lb) 68.5 kg (151 lb) 68.5 kg (151 lb)   Height 5' 5" (1.651 m) 5' 5" (1.651 m) 5' 5" (1.651 m)   Pain Score -- -- Four   Pain Loc --         Physical Exam  Vitals and nursing note reviewed. Constitutional:       General: She is not in acute distress. Appearance: She is well-developed. HENT:      Head: Normocephalic and atraumatic. Eyes:      Conjunctiva/sclera: Conjunctivae normal.   Cardiovascular:      Rate and Rhythm: Normal rate and regular rhythm. Heart sounds: No murmur heard. Pulmonary:      Effort: Pulmonary effort is normal. No respiratory distress. Breath sounds: Normal breath sounds. Abdominal:      Palpations: Abdomen is soft. Tenderness: There is no abdominal tenderness. Musculoskeletal:         General: No swelling. Cervical back: Neck supple. Right lower leg: No edema. Left lower leg: No edema.    Skin:     General: Skin is warm and dry. Capillary Refill: Capillary refill takes less than 2 seconds. Neurological:      Mental Status: She is alert. Psychiatric:         Mood and Affect: Mood normal.         Labs: I have personally reviewed pertinent lab results. ABG:   Lab Results   Component Value Date    SOURCE None given 08/17/2022   ,   BNP: No results found for: "BNP",   CBC:  Lab Results   Component Value Date    WBC 7.61 02/28/2023    HGB 14.9 02/28/2023    HCT 45.1 02/28/2023    MCV 92 02/28/2023     02/28/2023    EOSPCT 4 02/28/2023    EOSABS 0.28 02/28/2023    NEUTOPHILPCT 60 02/28/2023    LYMPHOPCT 26 02/28/2023   ,   CMP:   Lab Results   Component Value Date    SODIUM 140 12/03/2022    K 3.7 12/03/2022     12/03/2022    CO2 27 12/03/2022    BUN 9 12/03/2022    CREATININE 0.81 12/03/2022    CALCIUM 8.7 12/03/2022    AST 17 08/23/2022    ALT 21 08/23/2022    ALKPHOS 61 08/23/2022    PROT 7.4 01/20/2018    BILITOT 0.7 01/20/2018    EGFR 96 12/03/2022   ,   PT/INR: No results found for: "PT", "INR",   Troponin: No results found for: "TROPONINI"    Component      Latest Ref Rng & Units 2/28/2023   ALTERNARIA ALTERNATA      0.00 - 0.09 kUA/I <0.10   ASPERGILLUS FUMIGATUS      0.00 - 0.09 kUA/I <0.10   BERMUDA GRASS      0.00 - 0.09 kUA/I <0.10   ALLERGEN MAPLE/BOX ELDER      0.00 - 0.09 kUA/I <0.10   ALLERGEN CAT EPITHELIUM-DANDER      0.00 - 0.09 kUA/I <0.10   CLADOSPORIUM HERBARUM      0.00 - 0.09 kUA/I <0.10   COCKROACH      0.00 - 0.09 kUA/I <0.10   ALLERGEN, COMMON SILVER BIRCH      0.00 - 0.09 kUA/I <0.10   ALLERGEN, COTTONWOOD      0.00 - 0.09 kUA/I <0.10   D. FARINAE      0.00 - 0.09 kUA/I <0.10   D.  PTERONYSSINUS      0.00 - 0.09 kUA/I <0.10   DOG DANDER      0.00 - 0.09 kUA/I <0.10   ALLERGEN ELM      0.00 - 0.09 kUA/I <0.10   MOUNTAIN CEDAR TREE      0.00 - 0.09 kUA/I <0.10   ALLERGEN MUGWORT IGE      0.00 - 0.09 kUA/I <0.10   MULBERRY TREE      0.00 - 0.09 kUA/I <0.10   ALLERGEN, OAK      0.00 - 0.09 kUA/I <0.10   PENICILLIUM CHRYSOGENUM      0.00 - 0.09 kUA/I <0.10   ALLERGEN ROUGH PIGWEED (W14) IGE      0.00 - 0.09 kUA/I <0.10   COMMON RAGWEED      0.00 - 0.09 kUA/I <0.10   ALLERGEN SHEEP SORREL (W18) IGE      0.00 - 0.09 kUA/I <0.10   [place] TREE      0.00 - 0.09 kUA/I <0.10   DECLAN GRASS      0.00 - 0.09 kUA/I <0.10   WAL[place]UT TREE      0.00 - 0.09 kUA/I <0.10   WHITE AUREA TREE      0.00 - 0.09 kUA/I <0.10   TOTAL IGE      0 - 113 kU/l 4.14   MOUSE URINE      0.00 - 0.09 kUA/I <0.10       Imaging and other studies: I have personally reviewed pertinent reports. and I have personally reviewed pertinent films in PACS    CXR 6/6/23  Lungs clear    CXR 12/3/22  Lungs clear    CXR 1/25/23 at [facility] report only  Lungs clear      Pulmonary function testing:   Spirometry:  FEV1/FVC Ratio is 34 %. FEV1 is 1.15 L/32 % of predicted. FVC is 3.35 L/79 % of predicted. There is significant bronchodilator response     Lung volumes:  RV 1.18 L/74 % of predicted, TLC 4.70 L/84 % of predicted, RV/TLC ratio 25 %     Diffusing capacity:  93 % of predicted     IMPRESSION:  1. Moderate obstructive air flow limitation with normal vital capacity based on post bronchodilator spirometry  2. Significant bronchodilator response  3. Normal lung volumes  4. Normal diffusion capacity  5. Abnormal flow volume lobe consistent with obstruction     EKG, Pathology, and Other Studies: I have personally reviewed pertinent reports. Kaela Dover M.D.   [facility]

## 2023-07-17 ENCOUNTER — OFFICE VISIT (OUTPATIENT)
Dept: FAMILY MEDICINE CLINIC | Facility: CLINIC | Age: 33
End: 2023-07-17
Payer: COMMERCIAL

## 2023-07-17 VITALS
SYSTOLIC BLOOD PRESSURE: 111 MMHG | WEIGHT: 149 LBS | HEART RATE: 94 BPM | DIASTOLIC BLOOD PRESSURE: 70 MMHG | BODY MASS INDEX: 24.79 KG/M2 | TEMPERATURE: 98.7 F | OXYGEN SATURATION: 96 %

## 2023-07-17 DIAGNOSIS — R53.83 FATIGUE, UNSPECIFIED TYPE: ICD-10-CM

## 2023-07-17 DIAGNOSIS — L65.9 HAIR LOSS: Primary | ICD-10-CM

## 2023-07-17 PROCEDURE — 99213 OFFICE O/P EST LOW 20 MIN: CPT | Performed by: FAMILY MEDICINE

## 2023-07-17 RX ORDER — CITALOPRAM 40 MG/1
TABLET ORAL
COMMUNITY
Start: 2023-06-13

## 2023-07-17 NOTE — PROGRESS NOTES
Name: Baron Jj      : 1990      MRN: [id number]  Encounter Provider: Kathie Rogel DO  Encounter Date: 2023   Encounter department: [address] [place] [address]     1. Hair loss  -     CHAD Screen w/ Reflex to Titer/Pattern; Future; Expected date: 2023    2. Fatigue, unspecified type  -     Comprehensive metabolic panel; Future; Expected date: 2023  -     TSH, 3rd generation with Free T4 reflex; Future; Expected date: 2023           Subjective      Patient presents with a chief complaint of hair loss. She notices that over approximately 3 weeks she is seen an unusual amount of hair loss on her Christain Moles and when she washes her hair. She also notes some fatigue which is unusual for her. Review of Systems   Constitutional: Negative. Respiratory: Negative. Cardiovascular: Negative. Gastrointestinal: Negative. Genitourinary: Negative. Musculoskeletal: Negative. Skin:        Hair loss   Psychiatric/Behavioral: Negative. Current Outpatient Medications on File Prior to Visit   Medication Sig   • albuterol (PROVENTIL HFA,VENTOLIN HFA) 90 mcg/act inhaler Inhale 2 puffs every 4 (four) hours as needed for wheezing   • ARIPiprazole (ABILIFY) 5 mg tablet Take 1 tablet (5 mg total) by mouth daily at bedtime   • citalopram (CeleXA) 40 mg tablet    • montelukast (SINGULAIR) 10 mg tablet Take 1 tablet (10 mg total) by mouth daily at bedtime   • Multiple Vitamin (MULTIVITAMIN) tablet Take 1 tablet by mouth daily. • Symbicort 160-4.5 MCG/ACT inhaler INHALE TWO PUFFS BY MOUTH TWICE DAILY. RINSE MOUTH AFTER USE.    • tiotropium (Spiriva Respimat) 1.25 MCG/ACT AERS inhaler Inhale 2 puffs daily for 28 days   • EPINEPHrine (EPIPEN) 0.3 mg/0.3 mL SOAJ Inject 0.3 mL (0.3 mg total) into a muscle once for 1 dose   • hydrOXYzine HCL (ATARAX) 25 mg tablet  (Patient not taking: Reported on 2023)   • [DISCONTINUED] citalopram (CeleXA) 20 mg tablet take 1 and 1/2 tablets by mouth daily       Objective     /70 (BP Location: Left arm, Patient Position: Sitting, Cuff Size: Adult)   Pulse 94   Temp 98.7 °F (37.1 °C)   Wt 67.6 kg (149 lb)   LMP 07/13/2023 (Exact Date)   SpO2 96%   BMI 24.79 kg/m²     Physical Exam  Vitals and nursing note reviewed. Constitutional:       General: She is not in acute distress. Appearance: She is well-developed. She is not diaphoretic. HENT:      Head: Normocephalic and atraumatic. Eyes:      General:         Right eye: No discharge. Conjunctiva/sclera: Conjunctivae normal.      Pupils: Pupils are equal, round, and reactive to light. Neck:      Thyroid: No thyromegaly. Cardiovascular:      Rate and Rhythm: Normal rate and regular rhythm. Pulmonary:      Effort: Pulmonary effort is normal. No respiratory distress. Breath sounds: Normal breath sounds. Musculoskeletal:      Cervical back: Normal range of motion. Lymphadenopathy:      Cervical: No cervical adenopathy. Skin:     General: Skin is warm and dry. Neurological:      Mental Status: She is alert and oriented to person, place, and time. Psychiatric:         Behavior: Behavior normal.         Thought Content:  Thought content normal.         Judgment: Judgment normal.       Toney Spencer DO

## 2023-07-18 ENCOUNTER — APPOINTMENT (OUTPATIENT)
Dept: LAB | Facility: MEDICAL CENTER | Age: 33
End: 2023-07-18
Payer: COMMERCIAL

## 2023-07-18 DIAGNOSIS — R53.83 FATIGUE, UNSPECIFIED TYPE: ICD-10-CM

## 2023-07-18 DIAGNOSIS — L65.9 HAIR LOSS: ICD-10-CM

## 2023-07-18 LAB
ALBUMIN SERPL BCP-MCNC: 3.8 G/DL (ref 3.5–5)
ALP SERPL-CCNC: 57 U/L (ref 46–116)
ALT SERPL W P-5'-P-CCNC: 25 U/L (ref 12–78)
ANA SER QL IA: POSITIVE
ANION GAP SERPL CALCULATED.3IONS-SCNC: 3 MMOL/L
AST SERPL W P-5'-P-CCNC: 16 U/L (ref 5–45)
BILIRUB SERPL-MCNC: 0.45 MG/DL (ref 0.2–1)
BUN SERPL-MCNC: 14 MG/DL (ref 5–25)
CALCIUM SERPL-MCNC: 9.3 MG/DL (ref 8.3–10.1)
CHLORIDE SERPL-SCNC: 109 MMOL/L (ref 96–108)
CO2 SERPL-SCNC: 25 MMOL/L (ref 21–32)
CREAT SERPL-MCNC: 0.9 MG/DL (ref 0.6–1.3)
GFR SERPL CREATININE-BSD FRML MDRD: 84 ML/MIN/1.73SQ M
GLUCOSE P FAST SERPL-MCNC: 89 MG/DL (ref 65–99)
POTASSIUM SERPL-SCNC: 4.6 MMOL/L (ref 3.5–5.3)
PROT SERPL-MCNC: 7.5 G/DL (ref 6.4–8.4)
SODIUM SERPL-SCNC: 137 MMOL/L (ref 135–147)
TSH SERPL DL<=0.05 MIU/L-ACNC: 2.46 UIU/ML (ref 0.45–4.5)

## 2023-07-18 PROCEDURE — 86225 DNA ANTIBODY NATIVE: CPT

## 2023-07-18 PROCEDURE — 86235 NUCLEAR ANTIGEN ANTIBODY: CPT

## 2023-07-18 PROCEDURE — 84443 ASSAY THYROID STIM HORMONE: CPT

## 2023-07-18 PROCEDURE — 86038 ANTINUCLEAR ANTIBODIES: CPT

## 2023-07-18 PROCEDURE — 80053 COMPREHEN METABOLIC PANEL: CPT

## 2023-07-18 PROCEDURE — 36415 COLL VENOUS BLD VENIPUNCTURE: CPT

## 2023-07-18 PROCEDURE — 86039 ANTINUCLEAR ANTIBODIES (ANA): CPT

## 2023-07-19 LAB
ANA HOMOGEN SER QL IF: NORMAL
ANA HOMOGEN TITR SER: NORMAL {TITER}
CHROMATIN AB SERPL-ACNC: NEGATIVE
DSDNA AB SER-ACNC: 10 IU/ML
ENA SS-A AB SER IA-ACNC: NEGATIVE
ENA SS-B AB SER IA-ACNC: NEGATIVE

## 2023-07-24 ENCOUNTER — TELEPHONE (OUTPATIENT)
Dept: FAMILY MEDICINE CLINIC | Facility: CLINIC | Age: 33
End: 2023-07-24

## 2023-07-24 DIAGNOSIS — R76.8 POSITIVE ANA (ANTINUCLEAR ANTIBODY): ICD-10-CM

## 2023-07-24 DIAGNOSIS — R51.9 NONINTRACTABLE EPISODIC HEADACHE, UNSPECIFIED HEADACHE TYPE: Primary | ICD-10-CM

## 2023-07-24 DIAGNOSIS — L65.9 HAIR LOSS: ICD-10-CM

## 2023-07-24 DIAGNOSIS — R53.83 FATIGUE, UNSPECIFIED TYPE: ICD-10-CM

## 2023-07-24 RX ORDER — BUTALBITAL, ACETAMINOPHEN AND CAFFEINE 300; 40; 50 MG/1; MG/1; MG/1
1 CAPSULE ORAL 4 TIMES DAILY PRN
Qty: 20 CAPSULE | Refills: 0 | Status: SHIPPED | OUTPATIENT
Start: 2023-07-24

## 2023-07-24 NOTE — TELEPHONE ENCOUNTER
INSERTED ANGOCATHETER G 18 ON RT AC BLOOD DROW AND SENT TO LAB Pt left voicemail stating:    I was calling to get advice on what to do for a headache that has lasted for five days. I've been taking ibuprofen and Tylenol and it doesn't touch the headache.  The second reason I was calling is because I had some abnormal blood lab work and had not heard yet from your office as to like what it means

## 2023-07-24 NOTE — TELEPHONE ENCOUNTER
Prior Auth for Butalbital APAP Caffeine started thru covermymeds faxed to [facility] waiting determination

## 2023-07-25 DIAGNOSIS — R51.9 NONINTRACTABLE EPISODIC HEADACHE, UNSPECIFIED HEADACHE TYPE: Primary | ICD-10-CM

## 2023-07-25 RX ORDER — KETOROLAC TROMETHAMINE 10 MG/1
10 TABLET, FILM COATED ORAL EVERY 6 HOURS PRN
Qty: 20 TABLET | Refills: 0 | Status: SHIPPED | OUTPATIENT
Start: 2023-07-25

## 2023-07-26 ENCOUNTER — TELEPHONE (OUTPATIENT)
Dept: FAMILY MEDICINE CLINIC | Facility: CLINIC | Age: 33
End: 2023-07-26

## 2023-07-26 DIAGNOSIS — R53.83 FATIGUE, UNSPECIFIED TYPE: ICD-10-CM

## 2023-07-26 DIAGNOSIS — L65.9 HAIR LOSS: ICD-10-CM

## 2023-07-26 DIAGNOSIS — R76.8 POSITIVE ANA (ANTINUCLEAR ANTIBODY): Primary | ICD-10-CM

## 2023-07-26 NOTE — TELEPHONE ENCOUNTER
New referral placed for different rheumatologist, Maxime Nguyen at [facility]

## 2023-07-26 NOTE — TELEPHONE ENCOUNTER
Pt left voicemail stating:    I was calling on trying to get Doctor Janae's advice. I saw that he gave me a referral to rheumatology and I had called the rheumatologist office Doctor Gerard's office and they had told me that they are not seeing new patients until next year and advised me to call Doctor Nai Mullins back and find out if he can send me somewhere else or what Doctor Nai Mullins wants me to do. As my hair is still falling out in clumps. I'm still fatigued no matter how much sleep I get and I have no appetite.  I'm struggling to make myself eat again

## 2023-07-27 ENCOUNTER — PATIENT MESSAGE (OUTPATIENT)
Dept: FAMILY MEDICINE CLINIC | Facility: CLINIC | Age: 33
End: 2023-07-27

## 2023-07-27 NOTE — TELEPHONE ENCOUNTER
Pt informed.  [facility] please [facility] referral to them at [phone]

## 2023-07-27 NOTE — TELEPHONE ENCOUNTER
Catching up on messages, this was left yesterday. [facility] doesn't accept her insurance. "Hi, good afternoon. This message is for Doctor OLuisa's office. My name is Maria R Crockett. I am giving a call with [facility]. I am calling in regards to a patient. Laura's last name is Zev Munguia. MADIHA's YOB: 1990. Unfortunately, they do not participate with the [employer]. So if you can please find another provider for rheumatology for this patient.  Thank you and you have a good."

## 2023-07-28 DIAGNOSIS — R53.83 FATIGUE, UNSPECIFIED TYPE: ICD-10-CM

## 2023-07-28 DIAGNOSIS — L65.9 HAIR LOSS: ICD-10-CM

## 2023-07-28 DIAGNOSIS — R76.8 POSITIVE ANA (ANTINUCLEAR ANTIBODY): Primary | ICD-10-CM

## 2023-07-28 NOTE — TELEPHONE ENCOUNTER
Please notify patient that alternative rheumatology physician that I referred her to does not accept her insurance. I have now requested what is called an ambulatory E consult which means that the rheumatologist from [facility] will review her chart and lab results and make additional recommendations to me.

## 2023-07-31 ENCOUNTER — E-CONSULT (OUTPATIENT)
Dept: RHEUMATOLOGY | Facility: CLINIC | Age: 33
End: 2023-07-31
Payer: COMMERCIAL

## 2023-07-31 DIAGNOSIS — R76.8 POSITIVE ANA (ANTINUCLEAR ANTIBODY): Primary | ICD-10-CM

## 2023-07-31 DIAGNOSIS — R53.83 FATIGUE, UNSPECIFIED TYPE: ICD-10-CM

## 2023-07-31 DIAGNOSIS — L65.9 HAIR LOSS: ICD-10-CM

## 2023-07-31 PROCEDURE — 99447 NTRPROF PH1/NTRNET/EHR 11-20: CPT | Performed by: INTERNAL MEDICINE

## 2023-07-31 NOTE — PROGRESS NOTES
Please notify patient that we did an E consult with rheumatology regarding her testing so far and her symptoms. They reviewed her chart and gave me some suggestions for additional testing and things that we can do. I placed orders in her chart for both blood and urine testing that she can get done at her convenience in the near future. We may then be able to provide some treatment afterwards depending on results. They also recommended she keep checking with rheumatology as there may be openings in the schedule if there are cancellations. She is scheduled for February but they may be able to move her up but she needs to keep calling to double check.

## 2023-07-31 NOTE — PROGRESS NOTES
E-Consult  Noemi Mcnulty 35 y.o. female MRN: [id number]  Encounter Date: 07/31/23      Reason for Consult / Principal Problem: +CHAD    Consulting Provider: Amelia Sullivan MD    Requesting Provider: Janae Mc DO       ASSESSMENT:  +CHAD, +DS DNA in the setting of alopecia    RECOMMENDATIONS:  Check U/a with micro, urine protein/creat ratio, esr/crp, C3, C4, anti Smith, Anti RNP, anti cariolipin antibodies, anti-Beta2 GP 1 antibodies, lupus anticoagulant. If any joint pain, swelling or stiffness you can give her a prednisone taper to use PRN or Medrol dosepak. Recc.sun avoidance and SPF 50 or greater. She has an appt. In Feb.  Please have her continue to call to see if she can be seen sooner or if there is a cancellation then we can see her sooner. Total time spent 11-20 minutes, >50% of the total time devoted to medical consultative verbal/EMR discussion between providers. Written report will be generated in the EMR. Jim Duarte

## 2023-08-01 ENCOUNTER — APPOINTMENT (OUTPATIENT)
Dept: LAB | Facility: MEDICAL CENTER | Age: 33
End: 2023-08-01
Payer: COMMERCIAL

## 2023-08-01 DIAGNOSIS — R76.8 POSITIVE ANA (ANTINUCLEAR ANTIBODY): ICD-10-CM

## 2023-08-01 DIAGNOSIS — R53.83 FATIGUE, UNSPECIFIED TYPE: ICD-10-CM

## 2023-08-01 DIAGNOSIS — L65.9 HAIR LOSS: ICD-10-CM

## 2023-08-01 LAB
BACTERIA UR QL AUTO: ABNORMAL /HPF
BILIRUB UR QL STRIP: NEGATIVE
C4 SERPL-MCNC: 29 MG/DL (ref 10–40)
CLARITY UR: ABNORMAL
COLOR UR: YELLOW
CREAT UR-MCNC: 168 MG/DL
CRP SERPL QL: <3 MG/L
ERYTHROCYTE [SEDIMENTATION RATE] IN BLOOD: 1 MM/HOUR (ref 0–19)
GLUCOSE UR STRIP-MCNC: NEGATIVE MG/DL
HGB UR QL STRIP.AUTO: NEGATIVE
KETONES UR STRIP-MCNC: NEGATIVE MG/DL
LEUKOCYTE ESTERASE UR QL STRIP: ABNORMAL
MUCOUS THREADS UR QL AUTO: ABNORMAL
NITRITE UR QL STRIP: NEGATIVE
NON-SQ EPI CELLS URNS QL MICRO: ABNORMAL /HPF
PH UR STRIP.AUTO: 7 [PH]
PROT UR STRIP-MCNC: ABNORMAL MG/DL
PROT UR-MCNC: 17 MG/DL
PROT/CREAT UR: 0.1 MG/G{CREAT} (ref 0–0.1)
RBC #/AREA URNS AUTO: ABNORMAL /HPF
SP GR UR STRIP.AUTO: 1.02 (ref 1–1.03)
UROBILINOGEN UR STRIP-ACNC: <2 MG/DL
WBC #/AREA URNS AUTO: ABNORMAL /HPF

## 2023-08-01 PROCEDURE — 85732 THROMBOPLASTIN TIME PARTIAL: CPT

## 2023-08-01 PROCEDURE — 36415 COLL VENOUS BLD VENIPUNCTURE: CPT

## 2023-08-01 PROCEDURE — 85652 RBC SED RATE AUTOMATED: CPT | Performed by: FAMILY MEDICINE

## 2023-08-01 PROCEDURE — 86147 CARDIOLIPIN ANTIBODY EA IG: CPT

## 2023-08-01 PROCEDURE — 86140 C-REACTIVE PROTEIN: CPT | Performed by: FAMILY MEDICINE

## 2023-08-01 PROCEDURE — 86160 COMPLEMENT ANTIGEN: CPT

## 2023-08-01 PROCEDURE — 81001 URINALYSIS AUTO W/SCOPE: CPT | Performed by: FAMILY MEDICINE

## 2023-08-01 PROCEDURE — 82570 ASSAY OF URINE CREATININE: CPT | Performed by: FAMILY MEDICINE

## 2023-08-01 PROCEDURE — 86235 NUCLEAR ANTIGEN ANTIBODY: CPT

## 2023-08-01 PROCEDURE — 84156 ASSAY OF PROTEIN URINE: CPT | Performed by: FAMILY MEDICINE

## 2023-08-01 PROCEDURE — 85705 THROMBOPLASTIN INHIBITION: CPT

## 2023-08-01 PROCEDURE — 85670 THROMBIN TIME PLASMA: CPT

## 2023-08-01 PROCEDURE — 85613 RUSSELL VIPER VENOM DILUTED: CPT

## 2023-08-02 LAB
ENA RNP AB SER-ACNC: 0.2 AI (ref 0–0.9)
ENA SM AB SER-ACNC: <0.2 AI (ref 0–0.9)

## 2023-08-03 LAB
APTT SCREEN TO CONFIRM RATIO: 1.08 RATIO (ref 0–1.34)
APTT-LA 1H NP PPP: 39.1 SEC (ref 0–40.5)
APTT-LA IMM 4:1 NP PPP: 40.3 SEC (ref 0–40.5)
CARDIOLIPIN IGA SER IA-ACNC: 0.9
CARDIOLIPIN IGG SER IA-ACNC: 1
CARDIOLIPIN IGM SER IA-ACNC: 2.4
CONFIRM APTT/NORMAL: 40.2 SEC (ref 0–47.6)
LA PPP-IMP: ABNORMAL
SCREEN APTT: 44.5 SEC (ref 0–43.5)
SCREEN DRVVT: 39.2 SEC (ref 0–47)
THROMBIN TIME: 18.1 SEC (ref 0–23)

## 2023-08-07 DIAGNOSIS — R31.9 HEMATURIA, UNSPECIFIED TYPE: Primary | ICD-10-CM

## 2023-08-08 ENCOUNTER — APPOINTMENT (OUTPATIENT)
Dept: LAB | Facility: MEDICAL CENTER | Age: 33
End: 2023-08-08
Payer: COMMERCIAL

## 2023-08-08 DIAGNOSIS — R31.9 HEMATURIA, UNSPECIFIED TYPE: ICD-10-CM

## 2023-08-08 LAB
BACTERIA UR QL AUTO: ABNORMAL /HPF
BILIRUB UR QL STRIP: NEGATIVE
CLARITY UR: CLEAR
COLOR UR: YELLOW
GLUCOSE UR STRIP-MCNC: NEGATIVE MG/DL
HGB UR QL STRIP.AUTO: NEGATIVE
KETONES UR STRIP-MCNC: NEGATIVE MG/DL
LEUKOCYTE ESTERASE UR QL STRIP: ABNORMAL
MUCOUS THREADS UR QL AUTO: ABNORMAL
NITRITE UR QL STRIP: NEGATIVE
NON-SQ EPI CELLS URNS QL MICRO: ABNORMAL /HPF
PH UR STRIP.AUTO: 6.5 [PH]
PROT UR STRIP-MCNC: NEGATIVE MG/DL
RBC #/AREA URNS AUTO: ABNORMAL /HPF
SP GR UR STRIP.AUTO: 1.01 (ref 1–1.03)
UROBILINOGEN UR STRIP-ACNC: <2 MG/DL
WBC #/AREA URNS AUTO: ABNORMAL /HPF

## 2023-08-08 PROCEDURE — 88112 CYTOPATH CELL ENHANCE TECH: CPT | Performed by: PATHOLOGY

## 2023-08-08 PROCEDURE — 87086 URINE CULTURE/COLONY COUNT: CPT

## 2023-08-08 PROCEDURE — 81001 URINALYSIS AUTO W/SCOPE: CPT

## 2023-08-09 LAB — BACTERIA UR CULT: NORMAL

## 2023-08-10 PROCEDURE — 88112 CYTOPATH CELL ENHANCE TECH: CPT | Performed by: PATHOLOGY

## 2023-08-14 ENCOUNTER — OFFICE VISIT (OUTPATIENT)
Dept: FAMILY MEDICINE CLINIC | Facility: CLINIC | Age: 33
End: 2023-08-14
Payer: COMMERCIAL

## 2023-08-14 VITALS
OXYGEN SATURATION: 97 % | HEIGHT: 65 IN | SYSTOLIC BLOOD PRESSURE: 110 MMHG | HEART RATE: 99 BPM | DIASTOLIC BLOOD PRESSURE: 70 MMHG | RESPIRATION RATE: 16 BRPM | TEMPERATURE: 98.2 F | BODY MASS INDEX: 24.83 KG/M2 | WEIGHT: 149 LBS

## 2023-08-14 DIAGNOSIS — B35.9 TINEA: ICD-10-CM

## 2023-08-14 DIAGNOSIS — M25.50 ARTHRALGIA, UNSPECIFIED JOINT: Primary | ICD-10-CM

## 2023-08-14 DIAGNOSIS — L65.9 HAIR LOSS: ICD-10-CM

## 2023-08-14 PROCEDURE — 99213 OFFICE O/P EST LOW 20 MIN: CPT | Performed by: FAMILY MEDICINE

## 2023-08-14 RX ORDER — KETOCONAZOLE 20 MG/G
CREAM TOPICAL DAILY
Qty: 30 G | Refills: 0 | Status: SHIPPED | OUTPATIENT
Start: 2023-08-14

## 2023-08-14 RX ORDER — PREDNISONE 10 MG/1
TABLET ORAL
Qty: 30 TABLET | Refills: 0 | Status: SHIPPED | OUTPATIENT
Start: 2023-08-14

## 2023-08-14 NOTE — PROGRESS NOTES
Name: Ela Alcantar      : 1990      MRN: [id number]  Encounter Provider: Syed Sosa DO  Encounter Date: 2023   Encounter department: [address] [place] [address]   Patient is a 35year old female with arthralgias, hair loss. Reviewed Dr. Mariluz Mazariegos note and consult from rheumatology. Will try Prednisone taper. 1. Arthralgia, unspecified joint  -     predniSONE 10 mg tablet; Take five tabs x2days with food, four tabs x 2d, three tabs x 2d, two tabs x 2d, one tab x 2 days with food. 2. Hair loss    3. Tinea  -     ketoconazole (NIZORAL) 2 % cream; Apply topically daily To neck         Subjective      Chief Complaint   Patient presents with   • left knee pain       Started with fatigue and hair loss. Now with Left knee pain for a couple months and then swelling of feet since the beginning of this month. Review of Systems   Constitutional: Positive for fatigue. Negative for chills and fever. HENT: Negative for congestion and sore throat. Respiratory: Negative for chest tightness. Cardiovascular: Negative for chest pain and palpitations. Gastrointestinal: Negative for abdominal pain, constipation, diarrhea and nausea. Genitourinary: Negative for difficulty urinating. Musculoskeletal: Positive for arthralgias. Skin: Negative. Neurological: Negative for dizziness and headaches. Psychiatric/Behavioral: Negative. Current Outpatient Medications on File Prior to Visit   Medication Sig   • albuterol (PROVENTIL HFA,VENTOLIN HFA) 90 mcg/act inhaler Inhale 2 puffs every 4 (four) hours as needed for wheezing   • ARIPiprazole (ABILIFY) 5 mg tablet Take 1 tablet (5 mg total) by mouth daily at bedtime   • Butalbital-APAP-Caffeine (Fioricet) -40 MG CAPS Take 1 capsule by mouth 4 (four) times a day as needed (headache)   • citalopram (CeleXA) 40 mg tablet    • Multiple Vitamin (MULTIVITAMIN) tablet Take 1 tablet by mouth daily.    • Symbicort 160-4.5 MCG/ACT inhaler INHALE TWO PUFFS BY MOUTH TWICE DAILY. RINSE MOUTH AFTER USE. • EPINEPHrine (EPIPEN) 0.3 mg/0.3 mL SOAJ Inject 0.3 mL (0.3 mg total) into a muscle once for 1 dose   • hydrOXYzine HCL (ATARAX) 25 mg tablet  (Patient not taking: Reported on 7/17/2023)   • ketorolac (TORADOL) 10 mg tablet Take 1 tablet (10 mg total) by mouth every 6 (six) hours as needed for moderate pain (Patient not taking: Reported on 8/14/2023)   • tiotropium (Spiriva Respimat) 1.25 MCG/ACT AERS inhaler Inhale 2 puffs daily for 28 days       Objective     /70 (BP Location: Left arm, Patient Position: Sitting, Cuff Size: Adult)   Pulse 99   Temp 98.2 °F (36.8 °C) (Temporal)   Resp 16   Ht 5' 4.96" (1.65 m)   Wt 67.6 kg (149 lb)   LMP 07/13/2023 (Exact Date)   SpO2 97%   BMI 24.82 kg/m²     Physical Exam  Vitals and nursing note reviewed. Constitutional:       General: She is not in acute distress. HENT:      Head: Normocephalic. Neck:      Thyroid: No thyromegaly. Cardiovascular:      Rate and Rhythm: Normal rate and regular rhythm. Heart sounds: Normal heart sounds. Pulmonary:      Effort: Pulmonary effort is normal.      Breath sounds: Normal breath sounds. Musculoskeletal:      Right lower leg: No edema. Left lower leg: No edema. Lymphadenopathy:      Cervical: No cervical adenopathy. Skin:     General: Skin is warm and dry. Findings: No erythema. Neurological:      Mental Status: She is alert and oriented to person, place, and time.        Sergio Fitzpatrick DO

## 2023-08-27 DIAGNOSIS — J30.9 ALLERGIC RHINITIS, UNSPECIFIED SEASONALITY, UNSPECIFIED TRIGGER: ICD-10-CM

## 2023-08-27 DIAGNOSIS — J45.909 ASTHMA: ICD-10-CM

## 2023-08-27 RX ORDER — MONTELUKAST SODIUM 10 MG/1
10 TABLET ORAL
Qty: 30 TABLET | Refills: 0 | Status: SHIPPED | OUTPATIENT
Start: 2023-08-27

## 2023-08-31 ENCOUNTER — TELEPHONE (OUTPATIENT)
Dept: FAMILY MEDICINE CLINIC | Facility: CLINIC | Age: 33
End: 2023-08-31

## 2023-08-31 NOTE — TELEPHONE ENCOUNTER
Pt seen on 8/14 by Dr. Sonia Mckay and prescribed Ketoconazole cream for a rash. Pt stated the rash is not getting any better and is spreading over her torso, chest and armpit area. Due to no appt availability, pt advised to f/u with UC.

## 2023-09-08 ENCOUNTER — TELEPHONE (OUTPATIENT)
Age: 33
End: 2023-09-08

## 2023-09-08 NOTE — TELEPHONE ENCOUNTER
Caller: Self    Doctor: Sukhdeep Coronado    Reason for call: Patient returning Nimo's call to switch appt time from 845 to 145 for the same day?     Call back#: [phone]

## 2023-09-15 ENCOUNTER — APPOINTMENT (OUTPATIENT)
Dept: LAB | Facility: CLINIC | Age: 33
End: 2023-09-15
Payer: COMMERCIAL

## 2023-09-15 ENCOUNTER — OFFICE VISIT (OUTPATIENT)
Dept: RHEUMATOLOGY | Facility: CLINIC | Age: 33
End: 2023-09-15
Payer: COMMERCIAL

## 2023-09-15 VITALS
DIASTOLIC BLOOD PRESSURE: 98 MMHG | BODY MASS INDEX: 25.19 KG/M2 | WEIGHT: 151.2 LBS | HEART RATE: 96 BPM | OXYGEN SATURATION: 98 % | SYSTOLIC BLOOD PRESSURE: 116 MMHG | HEIGHT: 65 IN

## 2023-09-15 DIAGNOSIS — R76.8 POSITIVE ANA (ANTINUCLEAR ANTIBODY): Primary | ICD-10-CM

## 2023-09-15 DIAGNOSIS — R53.83 OTHER FATIGUE: ICD-10-CM

## 2023-09-15 DIAGNOSIS — M19.90 ARTHRITIS: ICD-10-CM

## 2023-09-15 LAB
25(OH)D3 SERPL-MCNC: 28.8 NG/ML (ref 30–100)
C3 SERPL-MCNC: 128 MG/DL (ref 87–200)

## 2023-09-15 PROCEDURE — 99244 OFF/OP CNSLTJ NEW/EST MOD 40: CPT | Performed by: INTERNAL MEDICINE

## 2023-09-15 PROCEDURE — 86225 DNA ANTIBODY NATIVE: CPT | Performed by: INTERNAL MEDICINE

## 2023-09-15 PROCEDURE — 86430 RHEUMATOID FACTOR TEST QUAL: CPT | Performed by: INTERNAL MEDICINE

## 2023-09-15 PROCEDURE — 82306 VITAMIN D 25 HYDROXY: CPT | Performed by: INTERNAL MEDICINE

## 2023-09-15 PROCEDURE — 36415 COLL VENOUS BLD VENIPUNCTURE: CPT | Performed by: INTERNAL MEDICINE

## 2023-09-15 PROCEDURE — 86200 CCP ANTIBODY: CPT | Performed by: INTERNAL MEDICINE

## 2023-09-15 PROCEDURE — 86160 COMPLEMENT ANTIGEN: CPT | Performed by: INTERNAL MEDICINE

## 2023-09-15 RX ORDER — IBUPROFEN 800 MG/1
800 TABLET ORAL EVERY 8 HOURS PRN
Qty: 90 TABLET | Refills: 6 | Status: SHIPPED | OUTPATIENT
Start: 2023-09-15
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
## 2023-09-15 NOTE — PATIENT INSTRUCTIONS
Do labs  Do knee x-rays  Take ibuprofen up to 3 times a day as needed for joint pain, take with food    Return to clinic in 6 months

## 2023-09-16 ENCOUNTER — APPOINTMENT (OUTPATIENT)
Dept: RADIOLOGY | Facility: MEDICAL CENTER | Age: 33
End: 2023-09-16
Payer: COMMERCIAL

## 2023-09-16 DIAGNOSIS — M19.90 ARTHRITIS: ICD-10-CM

## 2023-09-16 LAB — DSDNA AB SER-ACNC: 12 IU/ML (ref 0–9)

## 2023-09-16 PROCEDURE — 73562 X-RAY EXAM OF KNEE 3: CPT

## 2023-09-18 ENCOUNTER — PATIENT MESSAGE (OUTPATIENT)
Dept: RHEUMATOLOGY | Facility: CLINIC | Age: 33
End: 2023-09-18

## 2023-09-18 LAB — RHEUMATOID FACT SER QL LA: NEGATIVE

## 2023-09-19 LAB — CCP AB SER IA-ACNC: 1

## 2023-09-22 DIAGNOSIS — M19.90 ARTHRITIS: Primary | ICD-10-CM

## 2023-09-22 DIAGNOSIS — M32.9 LUPUS (HCC): ICD-10-CM

## 2023-09-22 RX ORDER — HYDROXYCHLOROQUINE SULFATE 200 MG/1
300 TABLET, FILM COATED ORAL DAILY
Qty: 45 TABLET | Refills: 6 | Status: SHIPPED | OUTPATIENT
Start: 2023-09-22 | End: 2024-04-19

## 2023-09-25 DIAGNOSIS — M32.9 LUPUS (HCC): Primary | ICD-10-CM

## 2023-09-25 RX ORDER — PREDNISONE 5 MG/1
TABLET ORAL
Qty: 70 TABLET | Refills: 0 | Status: SHIPPED | OUTPATIENT
Start: 2023-09-25

## 2023-11-13 ENCOUNTER — OFFICE VISIT (OUTPATIENT)
Dept: FAMILY MEDICINE CLINIC | Facility: CLINIC | Age: 33
End: 2023-11-13
Payer: COMMERCIAL

## 2023-11-13 VITALS
RESPIRATION RATE: 18 BRPM | HEART RATE: 102 BPM | WEIGHT: 154 LBS | SYSTOLIC BLOOD PRESSURE: 106 MMHG | OXYGEN SATURATION: 95 % | TEMPERATURE: 98.2 F | BODY MASS INDEX: 25.63 KG/M2 | DIASTOLIC BLOOD PRESSURE: 72 MMHG

## 2023-11-13 DIAGNOSIS — J40 BRONCHITIS: Primary | ICD-10-CM

## 2023-11-13 PROCEDURE — 99213 OFFICE O/P EST LOW 20 MIN: CPT | Performed by: FAMILY MEDICINE

## 2023-11-13 RX ORDER — PREDNISONE 10 MG/1
TABLET ORAL
Qty: 30 TABLET | Refills: 1 | Status: SHIPPED | OUTPATIENT
Start: 2023-11-13

## 2023-11-13 RX ORDER — AZITHROMYCIN 250 MG/1
TABLET, FILM COATED ORAL
Qty: 6 TABLET | Refills: 0 | Status: SHIPPED | OUTPATIENT
Start: 2023-11-13 | End: 2023-11-18

## 2023-11-13 NOTE — PROGRESS NOTES
Name: Neptali Butterfield      : 1990      MRN: [id number]  Encounter Provider: Hemalatha Haddad DO  Encounter Date: 2023   Encounter department: [address] [place] [address]   Patient is 29-year-old female seen for bronchitis. 1. Bronchitis  -     azithromycin (Zithromax) 250 mg tablet; Take 2 tablets (500 mg total) by mouth daily for 1 day, THEN 1 tablet (250 mg total) daily for 4 days. -     predniSONE 10 mg tablet; Take 5 tabs daily with food x 2days, 4 tabs x 2d, 3 tabs x 2d, 2 tabs x 2d, 1 tab x 2d         Subjective      Chief Complaint   Patient presents with   • Cough     Started Saturday, home COVID test negative, sick contact at home (awaiting son's pertussis test)       Patient with worsening cough - oxygen sat down to 92 % and is using nebulizer. Fels like someone is sitting on her chest.  Peak flow is usually 450 and now can't get up to 350. Cough  Associated symptoms include shortness of breath. Pertinent negatives include no sore throat. Review of Systems   HENT:  Negative for congestion and sore throat. Respiratory:  Positive for cough, chest tightness and shortness of breath. Current Outpatient Medications on File Prior to Visit   Medication Sig   • Symbicort 160-4.5 MCG/ACT inhaler INHALE TWO PUFFS BY MOUTH TWICE DAILY. RINSE MOUTH AFTER USE.    • albuterol (PROVENTIL HFA,VENTOLIN HFA) 90 mcg/act inhaler Inhale 2 puffs every 4 (four) hours as needed for wheezing   • ARIPiprazole (ABILIFY) 5 mg tablet Take 1 tablet (5 mg total) by mouth daily at bedtime   • Butalbital-APAP-Caffeine (Fioricet) -40 MG CAPS Take 1 capsule by mouth 4 (four) times a day as needed (headache) (Patient not taking: Reported on 9/15/2023)   • citalopram (CeleXA) 40 mg tablet    • EPINEPHrine (EPIPEN) 0.3 mg/0.3 mL SOAJ Inject 0.3 mL (0.3 mg total) into a muscle once for 1 dose   • hydroxychloroquine (PLAQUENIL) 200 mg tablet Take 1.5 tablets (300 mg total) by mouth in the morning   • hydrOXYzine HCL (ATARAX) 25 mg tablet 1-3 a day as needed   • ibuprofen (MOTRIN) 800 mg tablet Take 1 tablet (800 mg total) by mouth every 8 (eight) hours as needed for moderate pain   • ketoconazole (NIZORAL) 2 % cream Apply topically daily To neck (Patient not taking: Reported on 9/15/2023)   • ketorolac (TORADOL) 10 mg tablet Take 1 tablet (10 mg total) by mouth every 6 (six) hours as needed for moderate pain (Patient not taking: Reported on 8/14/2023)   • montelukast (SINGULAIR) 10 mg tablet TAKE 1 TABLET BY MOUTH DAILY AT BEDTIME (Patient not taking: Reported on 9/15/2023)   • Multiple Vitamin (MULTIVITAMIN) tablet Take 1 tablet by mouth daily. Objective     /72 (BP Location: Left arm, Patient Position: Sitting, Cuff Size: Standard)   Pulse 102   Temp 98.2 °F (36.8 °C) (Temporal)   Resp 18   Wt 69.9 kg (154 lb)   LMP 10/23/2023 (Within Days)   SpO2 95%   BMI 25.63 kg/m²     Physical Exam  Vitals and nursing note reviewed. Constitutional:       General: She is not in acute distress. HENT:      Head: Normocephalic. Right Ear: Tympanic membrane normal.      Left Ear: Tympanic membrane normal.      Nose: Congestion present. Mouth/Throat:      Pharynx: Posterior oropharyngeal erythema present. Neck:      Thyroid: No thyromegaly. Cardiovascular:      Rate and Rhythm: Normal rate and regular rhythm. Heart sounds: Normal heart sounds. Pulmonary:      Effort: Pulmonary effort is normal.      Comments: Coarse upper airway sounds  Lymphadenopathy:      Cervical: No cervical adenopathy. Skin:     General: Skin is warm and dry. Neurological:      Mental Status: She is alert and oriented to person, place, and time.    Psychiatric:         Mood and Affect: Mood normal.       Alexandrea Alford DO

## 2023-11-19 DIAGNOSIS — J45.50 SEVERE PERSISTENT ASTHMA WITHOUT COMPLICATION: ICD-10-CM

## 2023-11-20 RX ORDER — TIOTROPIUM BROMIDE INHALATION SPRAY 1.56 UG/1
SPRAY, METERED RESPIRATORY (INHALATION)
Qty: 4 G | Refills: 0 | Status: SHIPPED | OUTPATIENT
Start: 2023-11-20

## 2023-11-28 ENCOUNTER — TELEPHONE (OUTPATIENT)
Dept: PULMONOLOGY | Facility: CLINIC | Age: 33
End: 2023-11-28

## 2023-11-28 DIAGNOSIS — M32.9 LUPUS (HCC): Primary | ICD-10-CM

## 2023-11-28 RX ORDER — PREDNISONE 5 MG/1
TABLET ORAL
Qty: 70 TABLET | Refills: 0 | Status: SHIPPED | OUTPATIENT
Start: 2023-11-28

## 2023-11-28 NOTE — TELEPHONE ENCOUNTER
CHAD for pt to schedule follow up appointment with Dr. Kristel Sanchez in our [facility] office.

## 2024-01-05 ENCOUNTER — APPOINTMENT (OUTPATIENT)
Dept: LAB | Facility: MEDICAL CENTER | Age: 34
End: 2024-01-05
Payer: COMMERCIAL

## 2024-01-05 DIAGNOSIS — F33.9 RECURRENT MAJOR DEPRESSIVE DISORDER, REMISSION STATUS UNSPECIFIED (HCC): ICD-10-CM

## 2024-01-05 DIAGNOSIS — F41.1 GENERALIZED ANXIETY DISORDER: ICD-10-CM

## 2024-01-05 LAB
25(OH)D3 SERPL-MCNC: 47.4 NG/ML (ref 30–100)
ALBUMIN SERPL BCP-MCNC: 4.3 G/DL (ref 3.5–5)
ALP SERPL-CCNC: 38 U/L (ref 34–104)
ALT SERPL W P-5'-P-CCNC: 12 U/L (ref 7–52)
ANION GAP SERPL CALCULATED.3IONS-SCNC: 6 MMOL/L
AST SERPL W P-5'-P-CCNC: 20 U/L (ref 13–39)
BASOPHILS # BLD AUTO: 0.03 THOUSANDS/ÂΜL (ref 0–0.1)
BASOPHILS NFR BLD AUTO: 1 % (ref 0–1)
BILIRUB SERPL-MCNC: 0.35 MG/DL (ref 0.2–1)
BILIRUB UR QL STRIP: NEGATIVE
BUN SERPL-MCNC: 13 MG/DL (ref 5–25)
CALCIUM SERPL-MCNC: 9.1 MG/DL (ref 8.4–10.2)
CHLORIDE SERPL-SCNC: 104 MMOL/L (ref 96–108)
CHOLEST SERPL-MCNC: 170 MG/DL
CLARITY UR: CLEAR
CO2 SERPL-SCNC: 30 MMOL/L (ref 21–32)
COLOR UR: NORMAL
CREAT SERPL-MCNC: 0.72 MG/DL (ref 0.6–1.3)
EOSINOPHIL # BLD AUTO: 0.18 THOUSAND/ÂΜL (ref 0–0.61)
EOSINOPHIL NFR BLD AUTO: 5 % (ref 0–6)
ERYTHROCYTE [DISTWIDTH] IN BLOOD BY AUTOMATED COUNT: 12.6 % (ref 11.6–15.1)
GFR SERPL CREATININE-BSD FRML MDRD: 110 ML/MIN/1.73SQ M
GLUCOSE P FAST SERPL-MCNC: 90 MG/DL (ref 65–99)
GLUCOSE UR STRIP-MCNC: NEGATIVE MG/DL
HCT VFR BLD AUTO: 45.4 % (ref 34.8–46.1)
HDLC SERPL-MCNC: 51 MG/DL
HGB BLD-MCNC: 15 G/DL (ref 11.5–15.4)
HGB UR QL STRIP.AUTO: NEGATIVE
IMM GRANULOCYTES # BLD AUTO: 0.01 THOUSAND/UL (ref 0–0.2)
IMM GRANULOCYTES NFR BLD AUTO: 0 % (ref 0–2)
KETONES UR STRIP-MCNC: NEGATIVE MG/DL
LDLC SERPL CALC-MCNC: 106 MG/DL (ref 0–100)
LEUKOCYTE ESTERASE UR QL STRIP: NEGATIVE
LYMPHOCYTES # BLD AUTO: 1.59 THOUSANDS/ÂΜL (ref 0.6–4.47)
LYMPHOCYTES NFR BLD AUTO: 42 % (ref 14–44)
MCH RBC QN AUTO: 30.5 PG (ref 26.8–34.3)
MCHC RBC AUTO-ENTMCNC: 33 G/DL (ref 31.4–37.4)
MCV RBC AUTO: 92 FL (ref 82–98)
MONOCYTES # BLD AUTO: 0.43 THOUSAND/ÂΜL (ref 0.17–1.22)
MONOCYTES NFR BLD AUTO: 11 % (ref 4–12)
NEUTROPHILS # BLD AUTO: 1.56 THOUSANDS/ÂΜL (ref 1.85–7.62)
NEUTS SEG NFR BLD AUTO: 41 % (ref 43–75)
NITRITE UR QL STRIP: NEGATIVE
NONHDLC SERPL-MCNC: 119 MG/DL
NRBC BLD AUTO-RTO: 0 /100 WBCS
PH UR STRIP.AUTO: 7 [PH]
PLATELET # BLD AUTO: 269 THOUSANDS/UL (ref 149–390)
PMV BLD AUTO: 9.2 FL (ref 8.9–12.7)
POTASSIUM SERPL-SCNC: 4.4 MMOL/L (ref 3.5–5.3)
PROT SERPL-MCNC: 6.8 G/DL (ref 6.4–8.4)
PROT UR STRIP-MCNC: NEGATIVE MG/DL
RBC # BLD AUTO: 4.92 MILLION/UL (ref 3.81–5.12)
SODIUM SERPL-SCNC: 140 MMOL/L (ref 135–147)
SP GR UR STRIP.AUTO: 1.01 (ref 1–1.03)
TRIGL SERPL-MCNC: 67 MG/DL
TSH SERPL DL<=0.05 MIU/L-ACNC: 2.38 UIU/ML (ref 0.45–4.5)
UROBILINOGEN UR STRIP-ACNC: <2 MG/DL
WBC # BLD AUTO: 3.8 THOUSAND/UL (ref 4.31–10.16)

## 2024-01-05 PROCEDURE — 80061 LIPID PANEL: CPT

## 2024-01-05 PROCEDURE — 82306 VITAMIN D 25 HYDROXY: CPT

## 2024-01-05 PROCEDURE — 84443 ASSAY THYROID STIM HORMONE: CPT

## 2024-01-05 PROCEDURE — 81003 URINALYSIS AUTO W/O SCOPE: CPT

## 2024-01-05 PROCEDURE — 85025 COMPLETE CBC W/AUTO DIFF WBC: CPT

## 2024-01-05 PROCEDURE — 80053 COMPREHEN METABOLIC PANEL: CPT

## 2024-01-05 PROCEDURE — 36415 COLL VENOUS BLD VENIPUNCTURE: CPT

## 2024-01-08 ENCOUNTER — TELEPHONE (OUTPATIENT)
Dept: FAMILY MEDICINE CLINIC | Facility: CLINIC | Age: 34
End: 2024-01-08

## 2024-01-08 NOTE — TELEPHONE ENCOUNTER
Message from patient:    Carlos, this is Laura Christiansen's birthday to 1990. Phone number is [phone]. I was calling because I had a question about recent test results that my psychiatrist had ordered on January the 5th. They came back as low for white blood cell count and low for the neutrophils. So the psychiatrist office advised me to call you all and family doctor office to get advice. I've also been dealing with off and on dizziness since January 4th, the afternoon that would be Thursday, as well as a constant headache since January 5th. Ibuprofen helps the headache come down a little bit. Nothing really helps the dizziness when it comes on. It's like a dizzy, lightheaded feeling. So calling for Kallie Christiansen, birth date 1990, phone number [phone]. Calling about advice about recent low blood work, what I should do, or if I need to be seen in the office. Thank you and have a nice day.

## 2024-01-11 ENCOUNTER — TRANSITIONAL CARE MANAGEMENT (OUTPATIENT)
Dept: FAMILY MEDICINE CLINIC | Facility: CLINIC | Age: 34
End: 2024-01-11

## 2024-01-23 DIAGNOSIS — M32.9 LUPUS (HCC): Primary | ICD-10-CM

## 2024-01-23 RX ORDER — METHYLPREDNISOLONE 4 MG/1
TABLET ORAL
Qty: 50 TABLET | Refills: 0 | Status: SHIPPED | OUTPATIENT
Start: 2024-01-23 | End: 2024-01-29 | Stop reason: ALTCHOICE

## 2024-01-29 ENCOUNTER — OFFICE VISIT (OUTPATIENT)
Dept: RHEUMATOLOGY | Facility: CLINIC | Age: 34
End: 2024-01-29
Payer: COMMERCIAL

## 2024-01-29 ENCOUNTER — APPOINTMENT (OUTPATIENT)
Dept: LAB | Facility: CLINIC | Age: 34
End: 2024-01-29
Payer: COMMERCIAL

## 2024-01-29 VITALS
DIASTOLIC BLOOD PRESSURE: 72 MMHG | OXYGEN SATURATION: 98 % | BODY MASS INDEX: 25.66 KG/M2 | HEIGHT: 65 IN | HEART RATE: 78 BPM | SYSTOLIC BLOOD PRESSURE: 122 MMHG | WEIGHT: 154 LBS

## 2024-01-29 DIAGNOSIS — M32.9 LUPUS (HCC): Primary | ICD-10-CM

## 2024-01-29 DIAGNOSIS — M19.90 ARTHRITIS: ICD-10-CM

## 2024-01-29 DIAGNOSIS — Z79.899 HIGH RISK MEDICATION USE: ICD-10-CM

## 2024-01-29 DIAGNOSIS — M32.9 LUPUS (HCC): ICD-10-CM

## 2024-01-29 LAB
ALBUMIN SERPL BCP-MCNC: 4.5 G/DL (ref 3.5–5)
ALP SERPL-CCNC: 37 U/L (ref 34–104)
ALT SERPL W P-5'-P-CCNC: 10 U/L (ref 7–52)
ANION GAP SERPL CALCULATED.3IONS-SCNC: 7 MMOL/L
AST SERPL W P-5'-P-CCNC: 13 U/L (ref 13–39)
BACTERIA UR QL AUTO: ABNORMAL /HPF
BASOPHILS # BLD AUTO: 0.05 THOUSANDS/ÂΜL (ref 0–0.1)
BASOPHILS NFR BLD AUTO: 1 % (ref 0–1)
BILIRUB SERPL-MCNC: 0.4 MG/DL (ref 0.2–1)
BILIRUB UR QL STRIP: NEGATIVE
BUN SERPL-MCNC: 22 MG/DL (ref 5–25)
C3 SERPL-MCNC: 106 MG/DL (ref 87–200)
C4 SERPL-MCNC: 24 MG/DL (ref 19–52)
CALCIUM SERPL-MCNC: 9.2 MG/DL (ref 8.4–10.2)
CAOX CRY URNS QL MICRO: ABNORMAL /HPF
CHLORIDE SERPL-SCNC: 104 MMOL/L (ref 96–108)
CLARITY UR: ABNORMAL
CO2 SERPL-SCNC: 27 MMOL/L (ref 21–32)
COLOR UR: ABNORMAL
CREAT SERPL-MCNC: 0.73 MG/DL (ref 0.6–1.3)
CREAT UR-MCNC: 213.4 MG/DL
CRP SERPL QL: <1 MG/L
EOSINOPHIL # BLD AUTO: 0.09 THOUSAND/ÂΜL (ref 0–0.61)
EOSINOPHIL NFR BLD AUTO: 2 % (ref 0–6)
ERYTHROCYTE [DISTWIDTH] IN BLOOD BY AUTOMATED COUNT: 12.7 % (ref 11.6–15.1)
ERYTHROCYTE [SEDIMENTATION RATE] IN BLOOD: <1 MM/HOUR (ref 0–19)
GFR SERPL CREATININE-BSD FRML MDRD: 108 ML/MIN/1.73SQ M
GLUCOSE SERPL-MCNC: 95 MG/DL (ref 65–140)
GLUCOSE UR STRIP-MCNC: NEGATIVE MG/DL
HCT VFR BLD AUTO: 46 % (ref 34.8–46.1)
HGB BLD-MCNC: 15.4 G/DL (ref 11.5–15.4)
HGB UR QL STRIP.AUTO: ABNORMAL
IMM GRANULOCYTES # BLD AUTO: 0.01 THOUSAND/UL (ref 0–0.2)
IMM GRANULOCYTES NFR BLD AUTO: 0 % (ref 0–2)
KETONES UR STRIP-MCNC: NEGATIVE MG/DL
LEUKOCYTE ESTERASE UR QL STRIP: NEGATIVE
LYMPHOCYTES # BLD AUTO: 0.9 THOUSANDS/ÂΜL (ref 0.6–4.47)
LYMPHOCYTES NFR BLD AUTO: 18 % (ref 14–44)
MCH RBC QN AUTO: 31.2 PG (ref 26.8–34.3)
MCHC RBC AUTO-ENTMCNC: 33.5 G/DL (ref 31.4–37.4)
MCV RBC AUTO: 93 FL (ref 82–98)
MONOCYTES # BLD AUTO: 0.37 THOUSAND/ÂΜL (ref 0.17–1.22)
MONOCYTES NFR BLD AUTO: 8 % (ref 4–12)
MUCOUS THREADS UR QL AUTO: ABNORMAL
NEUTROPHILS # BLD AUTO: 3.48 THOUSANDS/ÂΜL (ref 1.85–7.62)
NEUTS SEG NFR BLD AUTO: 71 % (ref 43–75)
NITRITE UR QL STRIP: NEGATIVE
NON-SQ EPI CELLS URNS QL MICRO: ABNORMAL /HPF
NRBC BLD AUTO-RTO: 0 /100 WBCS
PH UR STRIP.AUTO: 6 [PH]
PLATELET # BLD AUTO: 239 THOUSANDS/UL (ref 149–390)
PMV BLD AUTO: 8.9 FL (ref 8.9–12.7)
POTASSIUM SERPL-SCNC: 4.4 MMOL/L (ref 3.5–5.3)
PROT SERPL-MCNC: 6.6 G/DL (ref 6.4–8.4)
PROT UR STRIP-MCNC: ABNORMAL MG/DL
PROT UR-MCNC: 26 MG/DL
PROT/CREAT UR: 0.12 MG/G{CREAT} (ref 0–0.1)
RBC # BLD AUTO: 4.93 MILLION/UL (ref 3.81–5.12)
RBC #/AREA URNS AUTO: ABNORMAL /HPF
SODIUM SERPL-SCNC: 138 MMOL/L (ref 135–147)
SP GR UR STRIP.AUTO: 1.03 (ref 1–1.03)
UROBILINOGEN UR STRIP-ACNC: 2 MG/DL
WBC # BLD AUTO: 4.9 THOUSAND/UL (ref 4.31–10.16)
WBC #/AREA URNS AUTO: ABNORMAL /HPF

## 2024-01-29 PROCEDURE — 86225 DNA ANTIBODY NATIVE: CPT | Performed by: INTERNAL MEDICINE

## 2024-01-29 PROCEDURE — 36415 COLL VENOUS BLD VENIPUNCTURE: CPT | Performed by: INTERNAL MEDICINE

## 2024-01-29 PROCEDURE — 84156 ASSAY OF PROTEIN URINE: CPT | Performed by: INTERNAL MEDICINE

## 2024-01-29 PROCEDURE — 85652 RBC SED RATE AUTOMATED: CPT | Performed by: INTERNAL MEDICINE

## 2024-01-29 PROCEDURE — 86140 C-REACTIVE PROTEIN: CPT | Performed by: INTERNAL MEDICINE

## 2024-01-29 PROCEDURE — 82570 ASSAY OF URINE CREATININE: CPT | Performed by: INTERNAL MEDICINE

## 2024-01-29 PROCEDURE — 86160 COMPLEMENT ANTIGEN: CPT | Performed by: INTERNAL MEDICINE

## 2024-01-29 PROCEDURE — 20610 DRAIN/INJ JOINT/BURSA W/O US: CPT

## 2024-01-29 PROCEDURE — 81001 URINALYSIS AUTO W/SCOPE: CPT | Performed by: INTERNAL MEDICINE

## 2024-01-29 PROCEDURE — 80053 COMPREHEN METABOLIC PANEL: CPT | Performed by: INTERNAL MEDICINE

## 2024-01-29 PROCEDURE — 99215 OFFICE O/P EST HI 40 MIN: CPT

## 2024-01-29 PROCEDURE — 85025 COMPLETE CBC W/AUTO DIFF WBC: CPT | Performed by: INTERNAL MEDICINE

## 2024-01-29 RX ORDER — PREDNISONE 5 MG/1
TABLET ORAL
Qty: 70 TABLET | Refills: 0 | Status: SHIPPED | OUTPATIENT
Start: 2024-01-29

## 2024-01-29 RX ORDER — METHOTREXATE 2.5 MG/1
TABLET ORAL
Qty: 20 TABLET | Refills: 3 | Status: SHIPPED | OUTPATIENT
Start: 2024-01-29

## 2024-01-29 RX ORDER — FOLIC ACID 1 MG/1
1 TABLET ORAL DAILY
Qty: 30 TABLET | Refills: 5 | Status: SHIPPED | OUTPATIENT
Start: 2024-01-29

## 2024-01-29 RX ORDER — NAPROXEN 500 MG/1
500 TABLET ORAL 2 TIMES DAILY WITH MEALS
Qty: 60 TABLET | Refills: 6 | Status: SHIPPED | OUTPATIENT
Start: 2024-01-29

## 2024-01-29 RX ORDER — TRIAMCINOLONE ACETONIDE 40 MG/ML
40 INJECTION, SUSPENSION INTRA-ARTICULAR; INTRAMUSCULAR ONCE
Status: COMPLETED | OUTPATIENT
Start: 2024-01-29 | End: 2024-01-29

## 2024-01-29 RX ADMIN — TRIAMCINOLONE ACETONIDE 40 MG: 40 INJECTION, SUSPENSION INTRA-ARTICULAR; INTRAMUSCULAR at 09:46

## 2024-01-29 NOTE — PATIENT INSTRUCTIONS
Continue hydroxychloroquine one and a half tabs daily; get regular eye exams  Steroid muscle injection given in clinic today  Try naproxen twice a day as needed for joint pain instead of ibuprofen, take with food  Stop Medrol  Start prednisone taper  Start methotrexate 4 tabs once a week  Start folic acid daily  Do labs now, then again before next visit     Return to clinic on 3/19th

## 2024-01-29 NOTE — PROGRESS NOTES
Assessment and Plan:   Laura Christiansen is  33 y.o. female  that arrives to clinic for F/u on connective disease likely Lupus, which is progressing, presents acutely with flare. Patient reports, since being on methylprednisolone, has ongoing pain at MCP, PIP, DIP joints, elbows and toes, scored 6/10 even since starting steroid taper. Continues to experience morning stiffness which improves with activity and fatigue. Hair loss has decreased, not losing clumps of anymore. Had painless oral ulcers which resolved 2-3 months go. Patient's exercise/physical activity includes 30 min of treadmill which helps her morning stiffness. Labs were positive for dsDNA-ab at 12 and low Vit D at 28.8. Had normal Anti-CCP, RF and C3 complement. X-rays showed no acute findings at left and right knee.     Continue with Hydroxychloroquine 300 mg daily. Discussed recommendation for eye exam   40mg IM Kenalog steroid injection provided in clinic today   Stop Methylprednisolone taper. Start Prednisone taper.   Start Methotrexate 4 tabs once a week and start folic acid supplement daily. Side effects involving liver and cell counts discussed.   Stop Ibuprofen and start Naproxen twice daily with food   Check labs now. Repeat labs prior to next follow-up in March 2023       Return to clinic on 3/19th    Plan:  Diagnoses and all orders for this visit:    Lupus (HCC)  -     triamcinolone acetonide (KENALOG-40) 40 mg/mL injection 40 mg  -     methotrexate 2.5 MG tablet; 4 tablet po weekly (take all 4 tablets on the same day every week)  -     folic acid (FOLVITE) 1 mg tablet; Take 1 tablet (1 mg total) by mouth daily  -     predniSONE 5 mg tablet; 4 tabs x7 days, then 3 tabs x7 days, then 2 tabs x7 days, then 1 tab x7 days, then stop.  -     CBC and differential  -     C4 complement  -     C3 complement  -     Anti-DNA antibody, double-stranded  -     Comprehensive metabolic panel  -     C-reactive protein  -     Urinalysis with microscopic  -      Sedimentation rate, automated  -     Protein / creatinine ratio, urine  -     CBC and differential; Future  -     Comprehensive metabolic panel; Future  -     C-reactive protein; Future  -     Sedimentation rate, automated; Future    Arthritis  -     naproxen (Naprosyn) 500 mg tablet; Take 1 tablet (500 mg total) by mouth 2 (two) times a day with meals As needed for joint pain    High risk medication use    Other orders  -     Cholecalciferol (Vitamin D-3) 125 MCG (5000 UT) TABS; Take by mouth    High risk medication use - Benefits and risks of hydroxychloroquine, including but not limited to retinal toxicity, corneal deposits, gastrointestinal side effects, and headaches were discussed with the patient. The need for a regular eye exam to monitor for ocular toxicity while on this medication was also explained to the patient.     Benefits and risks of methotrexate use, including but not limited to gastrointestinal disturbances such as diarrhea, hair loss, fatigue, stomatitis, leukopenia, lung hypersensitivity reaction, hepatotoxicity, and lymphoma were discussed with the patient.  CBC,CMP will be regularly monitored.  It was also explained to the patient that methotrexate is teratogenic, which is not a concern for her since she has had bilateral oophorectomy.  Patient was prescribed Folic Acid 1 mg po daily to take while on methotrexate to help prevent side effects. Patient counseled on abstinence from alcohol while using methotrexate.     Follow-up plan: RTC on 3/19th        Rheumatic Disease Summary  Laura was seen 9/2023 as a new consult for evaluation of possible lupus. Patient had positive CHAD of 1:160 titer of homogenous pattern and positive anti-dsDNA.  Laura reported joint pain in her knees, hips, and toes.  Ibuprofen helped her joint pain a little. Patient admits to fatigue, hair loss, joint pain, and oral ulcer.  Another differential was possible undifferentiated connective tissue disease.  May start  hydroxychloroquine in the future. Was Rx'd Ibuprofen 800 mg three times a day wit food, additional labs and X-rays were ordered. Labs were positive for dsDNA-ab at 12 and low Vit D at 28.8. Had normal Anti-CCP, RF and C3 complement. X-rays showed no acute findings at left and right knee.     Patient reached out about labs. Was recommended to double her Vit D supplementation dose to 2000 IU and was started on hydroxychlorquine 300 mg daily.     Patient reached out about worsening joint pains at knees, hips and toes. Was started on prednisone course of starting dose 20 mg daily tapered by 5 mg every 7 days.      In Jan/2024, patient reach out due to severe joint pain while on hydroxychloroquine.  Was started on a methylprednisolone taper and recommended to come to clinic for urgent follow-up.       Chief Complaint  Follow-up of lupus    HPI  Laura Christiansen is a 33 y.o.  female who presents for follow-up on connective tissue disease likely Lupus. Since starting methylprednisolone taper reports her severe joint pains have not responded. Ongoing pain at MCP, PIP, DIP joints, elbows and toes, scored 6/10 even since starting steroid taper.       Reports her prior fatigue is ongoing and her hair loss has decreased, and did have oral ulcers but resolved 2-3 months ago. Also, morning stiffness is ongoing and improves with physical activity and rash has completely resolved.    Patient does not have much communication with her extended family and still unsure if any family members have autoimmune disease(s).      The following portions of the patient's history were reviewed and updated as appropriate: allergies, current medications, past family history, past medical history, past social history, past surgical history and problem list.    Review of Systems:   Review of Systems   Constitutional:  Positive for fatigue. Negative for chills and fever.        Tired/sluggish   HENT:  Negative for ear pain and sore throat.         Hair  loss   Eyes:  Negative for pain and visual disturbance.   Respiratory:  Negative for cough and shortness of breath.    Cardiovascular:  Negative for chest pain and palpitations.   Gastrointestinal:  Negative for abdominal pain and vomiting.   Genitourinary:  Negative for dysuria and hematuria.   Musculoskeletal:  Positive for arthralgias. Negative for back pain, gait problem, joint swelling, myalgias and neck pain.        Morning stiffness   Skin:  Negative for color change and rash.   Neurological:  Negative for seizures and syncope.   All other systems reviewed and are negative.    Reviewed and agree.    Home Medications:    Current Outpatient Medications:     albuterol (PROVENTIL HFA,VENTOLIN HFA) 90 mcg/act inhaler, Inhale 2 puffs every 4 (four) hours as needed for wheezing, Disp: 18 g, Rfl: 4    ARIPiprazole (ABILIFY) 5 mg tablet, Take 1 tablet (5 mg total) by mouth daily at bedtime, Disp: 30 tablet, Rfl: 1    Cholecalciferol (Vitamin D-3) 125 MCG (5000 UT) TABS, Take by mouth, Disp: , Rfl:     citalopram (CeleXA) 40 mg tablet, , Disp: , Rfl:     folic acid (FOLVITE) 1 mg tablet, Take 1 tablet (1 mg total) by mouth daily, Disp: 30 tablet, Rfl: 5    hydroxychloroquine (PLAQUENIL) 200 mg tablet, Take 1.5 tablets (300 mg total) by mouth in the morning, Disp: 45 tablet, Rfl: 6    methotrexate 2.5 MG tablet, 4 tablet po weekly (take all 4 tablets on the same day every week), Disp: 20 tablet, Rfl: 3    Multiple Vitamin (MULTIVITAMIN) tablet, Take 1 tablet by mouth daily., Disp: , Rfl:     naproxen (Naprosyn) 500 mg tablet, Take 1 tablet (500 mg total) by mouth 2 (two) times a day with meals As needed for joint pain, Disp: 60 tablet, Rfl: 6    predniSONE 5 mg tablet, 4 tabs x7 days, then 3 tabs x7 days, then 2 tabs x7 days, then 1 tab x7 days, then stop., Disp: 70 tablet, Rfl: 0    Spiriva Respimat 1.25 MCG/ACT AERS inhaler, INHALE TWO PUFFS BY MOUTH DAILY, Disp: 4 g, Rfl: 0    Symbicort 160-4.5 MCG/ACT inhaler,  "INHALE TWO PUFFS BY MOUTH TWICE DAILY. RINSE MOUTH AFTER USE., Disp: 10.2 g, Rfl: 11    Butalbital-APAP-Caffeine (Fioricet) -40 MG CAPS, Take 1 capsule by mouth 4 (four) times a day as needed (headache) (Patient not taking: Reported on 9/15/2023), Disp: 20 capsule, Rfl: 0    EPINEPHrine (EPIPEN) 0.3 mg/0.3 mL SOAJ, Inject 0.3 mL (0.3 mg total) into a muscle once for 1 dose, Disp: 0.6 mL, Rfl: 0    hydrOXYzine HCL (ATARAX) 25 mg tablet, 1-3 a day as needed (Patient not taking: Reported on 1/29/2024), Disp: , Rfl:     ketoconazole (NIZORAL) 2 % cream, Apply topically daily To neck (Patient not taking: Reported on 9/15/2023), Disp: 30 g, Rfl: 0    ketorolac (TORADOL) 10 mg tablet, Take 1 tablet (10 mg total) by mouth every 6 (six) hours as needed for moderate pain (Patient not taking: Reported on 8/14/2023), Disp: 20 tablet, Rfl: 0    montelukast (SINGULAIR) 10 mg tablet, TAKE 1 TABLET BY MOUTH DAILY AT BEDTIME (Patient not taking: Reported on 9/15/2023), Disp: 30 tablet, Rfl: 0    Objective:    Vitals:    01/29/24 0852   BP: 122/72   Pulse: 78   SpO2: 98%   Weight: 69.9 kg (154 lb)   Height: 5' 5\" (1.651 m)       Physical Exam  Vitals and nursing note reviewed.   Constitutional:       General: She is not in acute distress.     Appearance: Normal appearance. She is overweight. She is not ill-appearing or diaphoretic.   HENT:      Head: Normocephalic and atraumatic.      Mouth/Throat:      Mouth: Mucous membranes are moist.      Pharynx: Oropharynx is clear.      Comments: Without ulcers  Eyes:      General: No scleral icterus.     Conjunctiva/sclera: Conjunctivae normal.   Cardiovascular:      Rate and Rhythm: Normal rate and regular rhythm.      Pulses: Normal pulses.      Heart sounds: Normal heart sounds. No murmur heard.     No gallop.   Pulmonary:      Effort: Pulmonary effort is normal. No respiratory distress.      Breath sounds: Normal breath sounds. No wheezing or rales.   Abdominal:      General: Bowel " sounds are normal. There is no distension.      Palpations: Abdomen is soft. There is no mass.      Tenderness: There is no abdominal tenderness.   Musculoskeletal:         General: Tenderness (At L 4th digit MCP) present. Normal range of motion.      Cervical back: Normal range of motion and neck supple.      Right lower leg: No edema.      Left lower leg: No edema.   Skin:     General: Skin is warm and dry.      Capillary Refill: Capillary refill takes less than 2 seconds.      Coloration: Skin is not jaundiced or pale.   Neurological:      Mental Status: She is alert and oriented to person, place, and time. Mental status is at baseline.      Sensory: No sensory deficit.   Psychiatric:         Mood and Affect: Mood normal.         Behavior: Behavior normal.       Reviewed labs and imaging.    Imaging:   ECHO 2D COMPLETE    Result Date: 1/10/2024  Narrative: This result has an attachment that is not available.   Left Ventricle: Left ventricle is normal in size. Wall thickness is normal. Systolic function is normal with an ejection fraction of 60-65%. Wall motion is within normal limits. There is normal diastolic function.   Right Ventricle: Right ventricle cavity is normal. Systolic function is normal.   There is no hemodynamically significant valvular disease Left Ventricle Left ventricle is normal in size. Wall thickness is normal. Systolic function is normal with an ejection fraction of 60-65%. Wall motion is within normal limits. There is normal diastolic function. Right Ventricle Right ventricle cavity is normal. Systolic function is normal. Left Atrium Left atrium cavity size is normal. Right Atrium Right atrium cavity is normal. IVC/SVC The inferior vena cava demonstrates a diameter of <=21 mm and collapses <50%; therefore, the right atrial pressure is estimated at 5-10 mmHg. Mitral Valve Mitral valve structure is normal. There is no regurgitation or stenosis. Tricuspid Valve Tricuspid valve structure is  normal. There is no regurgitation or stenosis. Aortic Valve The aortic valve leaflets not well visualized, likely tricuspid. There is no regurgitation or stenosis. Pulmonic Valve Pulmonic valve structure is normal. There is no regurgitation or stenosis. Ascending Aorta The aorta appears normal in size. Pericardium There is no pericardial effusion. Study Details A complete 2D echocardiogram was performed. Color flow, Pulse Wave and Continuous Wave Doppler was performed and analyzed.    MRI brain w wo contrast    Result Date: 1/9/2024  Narrative: History : Headache, history of SLE   Procedure: MRI of the Brain with and without IV contrast   Contrast: 12 mL of Dotarem   Technique: Unenhanced Sagittal T1 FLAIR, axial diffusion, susceptibility weighted images, T1/ T2 FLAIR. Gadolinium enhanced axial T2, T1 and 3D BRAVO images.   Comparison: CT head dated 1/8/2044, MRI brain dated 9/24/2017 Findings: Brain parenchyma: There is no restricted diffusion to suggest acute infarct. There is no susceptibility within the brain parenchyma on gradient echo imaging to suggest blood product deposition. No focal parenchymal signal abnormality is identified. Postcontrast images demonstrate no definite abnormal parenchymal or meningeal enhancement. Ventricles: Unremarkable for the patient's age. Extra-axial spaces: Unremarkable for the patient's age. No cerebellar tonsillar ectopia. Intracranial Hemorrhage: None visualized by MRI. Midline shift: None.   Calvarium and Skull base: Marrow signal appears unremarkable. Orbits: Imaged intraorbital structures appear intact. Sella: Grossly unremarkable. Paranasal Sinuses: Tiny fluid level in the left maxillary sinus. Mild mucosal thickening in the ethmoid air cells.. Mastoids: Trace inferior right mastoid effusion.       Impression: Impression: No acute intracranial abnormality or abnormal enhancement. Workstation:[id number]    XR chest portable    Result Date: 1/9/2024  Narrative: Clinical  indication-dyspnea. Procedure-portable AP view of the chest: Comparison none study is dated January 2023 Findings- Several EKG leads are present. No lung consolidation, mass, pneumothorax or a pleural effusion. No evidence of pulmonary edema or cardiomegaly.    Impression: IMPRESSION: No acute abnormality detected. Workstation:[id number]    CT head wo contrast    Result Date: 1/8/2024  Narrative: Clinical information: Dizziness Technique: Unenhanced CT scan of the brain was performed by department technique. Images were reviewed in soft tissue and bone algorithms with 2.5 mm axial sections, sagittal and coronal reformations. Comparison: CT head and MRI the brain and IACs with and without contrast 9/24/2017. Findings Brain Parenchyma: Normal. Ventricular system, basal cisterns and extra-axial spaces: Normal. Intracranial hemorrhage: None. Midline shift: None. Skull base & Calvarium: No acute osseous abnormality. Paranasal sinuses and mastoid air cells: Scattered mild mucosal thickening in the paranasal sinuses. Mastoid air cells are well aerated. Visualized orbits: Normal. Sella: Normal.    Impression: Impression: No acute intracranial hemorrhage, mass effect, or acute large territorial infarction. Workstation:[id number]      Labs:   Office Visit on 01/29/2024   Component Date Value Ref Range Status    WBC 01/29/2024 4.90  4.31 - 10.16 Thousand/uL Final    RBC 01/29/2024 4.93  3.81 - 5.12 Million/uL Final    Hemoglobin 01/29/2024 15.4  11.5 - 15.4 g/dL Final    Hematocrit 01/29/2024 46.0  34.8 - 46.1 % Final    MCV 01/29/2024 93  82 - 98 fL Final    MCH 01/29/2024 31.2  26.8 - 34.3 pg Final    MCHC 01/29/2024 33.5  31.4 - 37.4 g/dL Final    RDW 01/29/2024 12.7  11.6 - 15.1 % Final    MPV 01/29/2024 8.9  8.9 - 12.7 fL Final    Platelets 01/29/2024 239  149 - 390 Thousands/uL Final    nRBC 01/29/2024 0  /100 WBCs Final    Neutrophils Relative 01/29/2024 71  43 - 75 % Final    Immat GRANS % 01/29/2024 0  0 - 2 % Final     Lymphocytes Relative 01/29/2024 18  14 - 44 % Final    Monocytes Relative 01/29/2024 8  4 - 12 % Final    Eosinophils Relative 01/29/2024 2  0 - 6 % Final    Basophils Relative 01/29/2024 1  0 - 1 % Final    Neutrophils Absolute 01/29/2024 3.48  1.85 - 7.62 Thousands/µL Final    Immature Grans Absolute 01/29/2024 0.01  0.00 - 0.20 Thousand/uL Final    Lymphocytes Absolute 01/29/2024 0.90  0.60 - 4.47 Thousands/µL Final    Monocytes Absolute 01/29/2024 0.37  0.17 - 1.22 Thousand/µL Final    Eosinophils Absolute 01/29/2024 0.09  0.00 - 0.61 Thousand/µL Final    Basophils Absolute 01/29/2024 0.05  0.00 - 0.10 Thousands/µL Final    C4, COMPLEMENT 01/29/2024 24  19 - 52 mg/dL Final    C3 Complement 01/29/2024 106  87 - 200 mg/dL Final    ds DNA Ab 01/29/2024 9  0 - 9 IU/mL Final                                       Negative      <5                                     Equivocal  5 - 9                                     Positive      >9    Sodium 01/29/2024 138  135 - 147 mmol/L Final    Potassium 01/29/2024 4.4  3.5 - 5.3 mmol/L Final    Chloride 01/29/2024 104  96 - 108 mmol/L Final    CO2 01/29/2024 27  21 - 32 mmol/L Final    ANION GAP 01/29/2024 7  mmol/L Final    BUN 01/29/2024 22  5 - 25 mg/dL Final    Creatinine 01/29/2024 0.73  0.60 - 1.30 mg/dL Final    Standardized to IDMS reference method    Glucose 01/29/2024 95  65 - 140 mg/dL Final    If the patient is fasting, the ADA then defines impaired fasting glucose as > 100 mg/dL and diabetes as > or equal to 123 mg/dL.    Calcium 01/29/2024 9.2  8.4 - 10.2 mg/dL Final    AST 01/29/2024 13  13 - 39 U/L Final    ALT 01/29/2024 10  7 - 52 U/L Final    Specimen collection should occur prior to Sulfasalazine administration due to the potential for falsely depressed results.     Alkaline Phosphatase 01/29/2024 37  34 - 104 U/L Final    Total Protein 01/29/2024 6.6  6.4 - 8.4 g/dL Final    Albumin 01/29/2024 4.5  3.5 - 5.0 g/dL Final    Total Bilirubin 01/29/2024 0.40   0.20 - 1.00 mg/dL Final    Use of this assay is not recommended for patients undergoing treatment with eltrombopag due to the potential for falsely elevated results.  N-acetyl-p-benzoquinone imine (metabolite of Acetaminophen) will generate erroneously low results in samples for patients that have taken an overdose of Acetaminophen.    eGFR 01/29/2024 108  ml/min/1.73sq m Final    CRP 01/29/2024 <1.0  <3.0 mg/L Final    Color, UA 01/29/2024 Orange   Final    Clarity, UA 01/29/2024 Extra Turbid   Final    Specific Gravity, UA 01/29/2024 1.033 (H)  1.003 - 1.030 Final    pH, UA 01/29/2024 6.0  4.5, 5.0, 5.5, 6.0, 6.5, 7.0, 7.5, 8.0 Final    Leukocytes, UA 01/29/2024 Negative  Negative Final    Nitrite, UA 01/29/2024 Negative  Negative Final    Protein, UA 01/29/2024 100 (2+) (A)  Negative mg/dl Final    Glucose, UA 01/29/2024 Negative  Negative mg/dl Final    Ketones, UA 01/29/2024 Negative  Negative mg/dl Final    Urobilinogen, UA 01/29/2024 2.0 (A)  <2.0 mg/dl mg/dl Final    Bilirubin, UA 01/29/2024 Negative  Negative Final    Occult Blood, UA 01/29/2024 Trace (A)  Negative Final    RBC, UA 01/29/2024 None Seen  None Seen, 1-2 /hpf Final    WBC, UA 01/29/2024 None Seen  None Seen, 1-2 /hpf Final    Epithelial Cells 01/29/2024 Occasional  None Seen, Occasional /hpf Final    Bacteria, UA 01/29/2024 Occasional  None Seen, Occasional /hpf Final    MUCUS THREADS 01/29/2024 Innumerable (A)  None Seen Final    Ca Oxalate Ines, UA 01/29/2024 Occasional (A)  None Seen /hpf Final    Sed Rate 01/29/2024 <1  0 - 19 mm/hour Final    Creatinine, Ur 01/29/2024 213.4  Reference range not established. mg/dL Final    Protein Urine Random 01/29/2024 26  Reference range not established. mg/dL Final    Prot/Creat Ratio, Ur 01/29/2024 0.12 (H)  0.00 - 0.10 Final   Appointment on 01/05/2024   Component Date Value Ref Range Status    WBC 01/05/2024 3.80 (L)  4.31 - 10.16 Thousand/uL Final    RBC 01/05/2024 4.92  3.81 - 5.12 Million/uL  Final    Hemoglobin 01/05/2024 15.0  11.5 - 15.4 g/dL Final    Hematocrit 01/05/2024 45.4  34.8 - 46.1 % Final    MCV 01/05/2024 92  82 - 98 fL Final    MCH 01/05/2024 30.5  26.8 - 34.3 pg Final    MCHC 01/05/2024 33.0  31.4 - 37.4 g/dL Final    RDW 01/05/2024 12.6  11.6 - 15.1 % Final    MPV 01/05/2024 9.2  8.9 - 12.7 fL Final    Platelets 01/05/2024 269  149 - 390 Thousands/uL Final    nRBC 01/05/2024 0  /100 WBCs Final    Neutrophils Relative 01/05/2024 41 (L)  43 - 75 % Final    Immat GRANS % 01/05/2024 0  0 - 2 % Final    Lymphocytes Relative 01/05/2024 42  14 - 44 % Final    Monocytes Relative 01/05/2024 11  4 - 12 % Final    Eosinophils Relative 01/05/2024 5  0 - 6 % Final    Basophils Relative 01/05/2024 1  0 - 1 % Final    Neutrophils Absolute 01/05/2024 1.56 (L)  1.85 - 7.62 Thousands/µL Final    Immature Grans Absolute 01/05/2024 0.01  0.00 - 0.20 Thousand/uL Final    Lymphocytes Absolute 01/05/2024 1.59  0.60 - 4.47 Thousands/µL Final    Monocytes Absolute 01/05/2024 0.43  0.17 - 1.22 Thousand/µL Final    Eosinophils Absolute 01/05/2024 0.18  0.00 - 0.61 Thousand/µL Final    Basophils Absolute 01/05/2024 0.03  0.00 - 0.10 Thousands/µL Final    Sodium 01/05/2024 140  135 - 147 mmol/L Final    Potassium 01/05/2024 4.4  3.5 - 5.3 mmol/L Final    Chloride 01/05/2024 104  96 - 108 mmol/L Final    CO2 01/05/2024 30  21 - 32 mmol/L Final    ANION GAP 01/05/2024 6  mmol/L Final    BUN 01/05/2024 13  5 - 25 mg/dL Final    Creatinine 01/05/2024 0.72  0.60 - 1.30 mg/dL Final    Standardized to IDMS reference method    Glucose, Fasting 01/05/2024 90  65 - 99 mg/dL Final    Calcium 01/05/2024 9.1  8.4 - 10.2 mg/dL Final    AST 01/05/2024 20  13 - 39 U/L Final    ALT 01/05/2024 12  7 - 52 U/L Final    Specimen collection should occur prior to Sulfasalazine administration due to the potential for falsely depressed results.     Alkaline Phosphatase 01/05/2024 38  34 - 104 U/L Final    Total Protein 01/05/2024 6.8  6.4 -  8.4 g/dL Final    Albumin 01/05/2024 4.3  3.5 - 5.0 g/dL Final    Total Bilirubin 01/05/2024 0.35  0.20 - 1.00 mg/dL Final    Use of this assay is not recommended for patients undergoing treatment with eltrombopag due to the potential for falsely elevated results.  N-acetyl-p-benzoquinone imine (metabolite of Acetaminophen) will generate erroneously low results in samples for patients that have taken an overdose of Acetaminophen.    eGFR 01/05/2024 110  ml/min/1.73sq m Final    TSH 3RD GENERATON 01/05/2024 2.379  0.450 - 4.500 uIU/mL Final    The recommended reference ranges for TSH during pregnancy are as follows:   First trimester 0.100 to 2.500 uIU/mL   Second trimester  0.200 to 3.000 uIU/mL   Third trimester 0.300 to 3.000 uIU/m    Note: Normal ranges may not apply to patients who are transgender, non-binary, or whose legal sex, sex at birth, and gender identity differ.  Adult TSH (3rd generation) reference range follows the recommended guidelines of the [employer], January, 2020.    Cholesterol 01/05/2024 170  See Comment mg/dL Final    Cholesterol:         Pediatric <18 Years        Desirable          <170 mg/dL      Borderline High    170-199 mg/dL      High               >=200 mg/dL        Adult >=18 Years            Desirable         <200 mg/dL      Borderline High   200-239 mg/dL      High              >239 mg/dL      Triglycerides 01/05/2024 67  See Comment mg/dL Final    Triglyceride:     0-9Y            <75mg/dL     10Y-17Y         <90 mg/dL       >=18Y     Normal          <150 mg/dL     Borderline High 150-199 mg/dL     High            200-499 mg/dL        Very High       >499 mg/dL    Specimen collection should occur prior to Metamizole administration due to the potential for falsely depressed results.    HDL, Direct 01/05/2024 51  >=50 mg/dL Final    LDL Calculated 01/05/2024 106 (H)  0 - 100 mg/dL Final    LDL Cholesterol:     Optimal           <100 mg/dl     Near Optimal       100-129 mg/dl     Above Optimal       Borderline High 130-159 mg/dl       High            160-189 mg/dl       Very High       >189 mg/dl         This screening LDL is a calculated result.   It does not have the accuracy of the Direct Measured LDL in the monitoring of patients with hyperlipidemia and/or statin therapy.   Direct Measure LDL (NVB490) must be ordered separately in these patients.    Non-HDL-Chol (CHOL-HDL) 01/05/2024 119  mg/dl Final    Vit D, 25-Hydroxy 01/05/2024 47.4  30.0 - 100.0 ng/mL Final    Vitamin D guidelines established by Clinical Guidelines Subcommittee  of the [employer] Task Force, 2011    Deficiency <20ng/ml   Insufficiency 20-30ng/ml   Sufficient  ng/ml     Color, UA 01/05/2024 Light Yellow   Final    Clarity, UA 01/05/2024 Clear   Final    Specific Gravity, UA 01/05/2024 1.010  1.003 - 1.030 Final    pH, UA 01/05/2024 7.0  4.5, 5.0, 5.5, 6.0, 6.5, 7.0, 7.5, 8.0 Final    Leukocytes, UA 01/05/2024 Negative  Negative Final    Nitrite, UA 01/05/2024 Negative  Negative Final    Protein, UA 01/05/2024 Negative  Negative mg/dl Final    Glucose, UA 01/05/2024 Negative  Negative mg/dl Final    Ketones, UA 01/05/2024 Negative  Negative mg/dl Final    Urobilinogen, UA 01/05/2024 <2.0  <2.0 mg/dl mg/dl Final    Bilirubin, UA 01/05/2024 Negative  Negative Final    Occult Blood, UA 01/05/2024 Negative  Negative Final   Office Visit on 09/15/2023   Component Date Value Ref Range Status    ds DNA Ab 09/15/2023 12 (H)  0 - 9 IU/mL Final                                       Negative      <5                                     Equivocal  5 - 9                                     Positive      >9    C3 Complement 09/15/2023 128  87 - 200 mg/dL Final    Vit D, 25-Hydroxy 09/15/2023 28.8 (L)  30.0 - 100.0 ng/mL Final    Vitamin D guidelines established by Clinical Guidelines Subcommittee  of the [employer] Task Force, 2011    Deficiency <20ng/ml   Insufficiency 20-30ng/ml   Sufficient   ng/ml     Rheumatoid Factor 09/15/2023 Negative  Negative Final    Cyclic Citrullinated Peptide Ab  09/15/2023 1.0  See comment Final   Appointment on 08/08/2023   Component Date Value Ref Range Status    Case Report 08/08/2023    Final                    Value:Non-gynecologic Cytology                          Case: [id number]                                  Authorizing Provider:  Jefry Cardoso DO       Collected:           08/08/2023 0824              Ordering Location:     [facility]  Received:            08/08/2023 0824                                     Group                                                                        Pathologist:           Natalie Johnson MD                                                                 Specimen:    Urine, Other                                                                               Final Diagnosis 08/08/2023    Final                    Value:This result contains rich text formatting which cannot be displayed here.    Note 08/08/2023    Final                    Value:This result contains rich text formatting which cannot be displayed here.    Gross Description 08/08/2023    Final                    Value:This result contains rich text formatting which cannot be displayed here.    Additional Information 08/08/2023    Final                    Value:This result contains rich text formatting which cannot be displayed here.    Color, UA 08/08/2023 Yellow   Final    Clarity, UA 08/08/2023 Clear   Final    Specific Gravity, UA 08/08/2023 1.015  1.005 - 1.030 Final    pH, UA 08/08/2023 6.5  4.5, 5.0, 5.5, 6.0, 6.5, 7.0, 7.5, 8.0 Final    Leukocytes, UA 08/08/2023 Small (A)  Negative Final    Nitrite, UA 08/08/2023 Negative  Negative Final    Protein, UA 08/08/2023 Negative  Negative mg/dl Final    Glucose, UA 08/08/2023 Negative  Negative mg/dl Final    Ketones, UA 08/08/2023 Negative  Negative mg/dl Final    Urobilinogen, UA 08/08/2023 <2.0   <2.0 mg/dl mg/dl Final    Bilirubin, UA 08/08/2023 Negative  Negative Final    Occult Blood, UA 08/08/2023 Negative  Negative Final    Urine Culture 08/08/2023 No Growth <1000 cfu/mL   Final    RBC, UA 08/08/2023 1-2  None Seen, 1-2 /hpf Final    WBC, UA 08/08/2023 1-2  None Seen, 1-2 /hpf Final    Epithelial Cells 08/08/2023 Occasional  None Seen, Occasional /hpf Final    Bacteria, UA 08/08/2023 None Seen  None Seen, Occasional /hpf Final    MUCUS THREADS 08/08/2023 Occasional (A)  None Seen Final   Appointment on 08/01/2023   Component Date Value Ref Range Status    C4, COMPLEMENT 08/01/2023 29.0  10.0 - 40.0 mg/dL Final    ANTICARDIOLIPIN IGG ANTIBODY 08/01/2023 1.0  See comment Final    ANTICARDIOLIPIN IGA ANTIBODY 08/01/2023 0.9  See comment Final    ANTICARDIOLIPIN IGM ANTIBODY 08/01/2023 2.4  See comment Final    PTT Lupus Anticoagulant 08/01/2023 44.5 (H)  0.0 - 43.5 sec Final    Dilute Viper Venom Time 08/01/2023 39.2  0.0 - 47.0 sec Final    DILUTE PROTHROMBIN TIME(DPT) 08/01/2023 40.2  0.0 - 47.6 sec Final    THROMBIN TIME (DRVW) 08/01/2023 18.1  0.0 - 23.0 sec Final    DPT CONFIRM RATIO 08/01/2023 1.08  0.00 - 1.34 Ratio Final    LUPUS REFLEX INTERPRETATION 08/01/2023 Comment:   Final    No lupus anticoagulant was detected. These results are consistent with  a deficiency of one or more intrinsic pathway factors.  Since the  dRVVT was within normal limits, the factors in question are VIII, IX,  XI, XII and the contact factors.  It should be noted that mixing  studies performed on samples with minimally extended aPTT results can  be equivocal.  Normal plasma can overcome weak inhibitors, also  resulting in a correction of the mixing study.    TYLER Ortiz (SM) Ab 08/01/2023 <0.2  0.0 - 0.9 AI Final    TYLER RNP Ab 08/01/2023 0.2  0.0 - 0.9 AI Final    PTT-LA MIX 08/01/2023 40.3  0.0 - 40.5 sec Final    PTT-LA INCUB MIX 08/01/2023 39.1  0.0 - 40.5 sec Final   Orders Only on 07/31/2023   Component Date Value Ref  Range Status    Color, UA 08/01/2023 Yellow   Final    Clarity, UA 08/01/2023 Turbid   Final    Specific Gravity, UA 08/01/2023 1.020  1.003 - 1.030 Final    pH, UA 08/01/2023 7.0  4.5, 5.0, 5.5, 6.0, 6.5, 7.0, 7.5, 8.0 Final    Leukocytes, UA 08/01/2023 Large (A)  Negative Final    Nitrite, UA 08/01/2023 Negative  Negative Final    Protein, UA 08/01/2023 30 (1+) (A)  Negative mg/dl Final    Glucose, UA 08/01/2023 Negative  Negative mg/dl Final    Ketones, UA 08/01/2023 Negative  Negative mg/dl Final    Urobilinogen, UA 08/01/2023 <2.0  <2.0 mg/dl mg/dl Final    Bilirubin, UA 08/01/2023 Negative  Negative Final    Occult Blood, UA 08/01/2023 Negative  Negative Final    Creatinine, Ur 08/01/2023 168.0  mg/dL Final    Protein Urine Random 08/01/2023 17  mg/dL Final    Prot/Creat Ratio, Ur 08/01/2023 0.10  0.00 - 0.10 Final    Sed Rate 08/01/2023 1  0 - 19 mm/hour Final    CRP 08/01/2023 <3.0  <3.0 mg/L Final    RBC, UA 08/01/2023 4-10 (A)  None Seen, 1-2 /hpf Final    WBC, UA 08/01/2023 1-2  None Seen, 1-2 /hpf Final    Epithelial Cells 08/01/2023 Occasional  None Seen, Occasional /hpf Final    Bacteria, UA 08/01/2023 Occasional  None Seen, Occasional /hpf Final    MUCUS THREADS 08/01/2023 Occasional (A)  None Seen Final

## 2024-01-31 LAB — DSDNA AB SER-ACNC: 9 IU/ML (ref 0–9)

## 2024-03-15 ENCOUNTER — TELEPHONE (OUTPATIENT)
Age: 34
End: 2024-03-15

## 2024-03-15 NOTE — TELEPHONE ENCOUNTER
Laura is calling regarding her lab work , Patient stated on her last visit in Jan ,  provide her with extra lab to get done in jan and for march . Patient did complete her lab work for jan but the labs for march she was told has been completed . Patient stated  requested for her to get a redo on the following lab work - CBC and differential . C reactive protein , Comprehensive metabolic and the Sedimentation rate .    Please advise [phone]

## 2024-03-16 DIAGNOSIS — Z79.899 HIGH RISK MEDICATION USE: ICD-10-CM

## 2024-03-16 DIAGNOSIS — M32.9 LUPUS (HCC): Primary | ICD-10-CM

## 2024-03-18 ENCOUNTER — APPOINTMENT (OUTPATIENT)
Dept: LAB | Facility: MEDICAL CENTER | Age: 34
End: 2024-03-18
Payer: COMMERCIAL

## 2024-03-18 PROCEDURE — 86140 C-REACTIVE PROTEIN: CPT | Performed by: INTERNAL MEDICINE

## 2024-03-18 PROCEDURE — 85652 RBC SED RATE AUTOMATED: CPT | Performed by: INTERNAL MEDICINE

## 2024-03-18 PROCEDURE — 85025 COMPLETE CBC W/AUTO DIFF WBC: CPT | Performed by: INTERNAL MEDICINE

## 2024-03-18 PROCEDURE — 80053 COMPREHEN METABOLIC PANEL: CPT | Performed by: INTERNAL MEDICINE

## 2024-03-18 PROCEDURE — 36415 COLL VENOUS BLD VENIPUNCTURE: CPT | Performed by: INTERNAL MEDICINE

## 2024-03-19 ENCOUNTER — OFFICE VISIT (OUTPATIENT)
Dept: RHEUMATOLOGY | Facility: CLINIC | Age: 34
End: 2024-03-19
Payer: COMMERCIAL

## 2024-03-19 VITALS
BODY MASS INDEX: 25.66 KG/M2 | OXYGEN SATURATION: 98 % | WEIGHT: 154 LBS | HEART RATE: 84 BPM | DIASTOLIC BLOOD PRESSURE: 68 MMHG | HEIGHT: 65 IN | SYSTOLIC BLOOD PRESSURE: 118 MMHG

## 2024-03-19 DIAGNOSIS — M19.90 ARTHRITIS: ICD-10-CM

## 2024-03-19 DIAGNOSIS — M32.9 LUPUS (HCC): Primary | ICD-10-CM

## 2024-03-19 DIAGNOSIS — Z79.899 HIGH RISK MEDICATION USE: ICD-10-CM

## 2024-03-19 LAB
ALBUMIN SERPL BCP-MCNC: 4.4 G/DL (ref 3.5–5)
ALP SERPL-CCNC: 50 U/L (ref 34–104)
ALT SERPL W P-5'-P-CCNC: 15 U/L (ref 7–52)
ANION GAP SERPL CALCULATED.3IONS-SCNC: 8 MMOL/L (ref 4–13)
AST SERPL W P-5'-P-CCNC: 17 U/L (ref 13–39)
BASOPHILS # BLD AUTO: 0.05 THOUSANDS/ÂΜL (ref 0–0.1)
BASOPHILS NFR BLD AUTO: 1 % (ref 0–1)
BILIRUB SERPL-MCNC: 0.27 MG/DL (ref 0.2–1)
BUN SERPL-MCNC: 18 MG/DL (ref 5–25)
CALCIUM SERPL-MCNC: 9.4 MG/DL (ref 8.4–10.2)
CHLORIDE SERPL-SCNC: 103 MMOL/L (ref 96–108)
CO2 SERPL-SCNC: 28 MMOL/L (ref 21–32)
CREAT SERPL-MCNC: 0.62 MG/DL (ref 0.6–1.3)
CRP SERPL QL: <1 MG/L
EOSINOPHIL # BLD AUTO: 0.1 THOUSAND/ÂΜL (ref 0–0.61)
EOSINOPHIL NFR BLD AUTO: 1 % (ref 0–6)
ERYTHROCYTE [DISTWIDTH] IN BLOOD BY AUTOMATED COUNT: 13.2 % (ref 11.6–15.1)
ERYTHROCYTE [SEDIMENTATION RATE] IN BLOOD: <1 MM/HOUR (ref 0–19)
GFR SERPL CREATININE-BSD FRML MDRD: 118 ML/MIN/1.73SQ M
GLUCOSE P FAST SERPL-MCNC: 97 MG/DL (ref 65–99)
HCT VFR BLD AUTO: 44.7 % (ref 34.8–46.1)
HGB BLD-MCNC: 14.8 G/DL (ref 11.5–15.4)
IMM GRANULOCYTES # BLD AUTO: 0.01 THOUSAND/UL (ref 0–0.2)
IMM GRANULOCYTES NFR BLD AUTO: 0 % (ref 0–2)
LYMPHOCYTES # BLD AUTO: 1.85 THOUSANDS/ÂΜL (ref 0.6–4.47)
LYMPHOCYTES NFR BLD AUTO: 25 % (ref 14–44)
MCH RBC QN AUTO: 31 PG (ref 26.8–34.3)
MCHC RBC AUTO-ENTMCNC: 33.1 G/DL (ref 31.4–37.4)
MCV RBC AUTO: 94 FL (ref 82–98)
MONOCYTES # BLD AUTO: 0.62 THOUSAND/ÂΜL (ref 0.17–1.22)
MONOCYTES NFR BLD AUTO: 9 % (ref 4–12)
NEUTROPHILS # BLD AUTO: 4.68 THOUSANDS/ÂΜL (ref 1.85–7.62)
NEUTS SEG NFR BLD AUTO: 64 % (ref 43–75)
NRBC BLD AUTO-RTO: 0 /100 WBCS
PLATELET # BLD AUTO: 277 THOUSANDS/UL (ref 149–390)
PMV BLD AUTO: 9.2 FL (ref 8.9–12.7)
POTASSIUM SERPL-SCNC: 4.4 MMOL/L (ref 3.5–5.3)
PROT SERPL-MCNC: 6.7 G/DL (ref 6.4–8.4)
RBC # BLD AUTO: 4.77 MILLION/UL (ref 3.81–5.12)
SODIUM SERPL-SCNC: 139 MMOL/L (ref 135–147)
WBC # BLD AUTO: 7.31 THOUSAND/UL (ref 4.31–10.16)

## 2024-03-19 PROCEDURE — 99215 OFFICE O/P EST HI 40 MIN: CPT | Performed by: INTERNAL MEDICINE

## 2024-03-19 RX ORDER — METHOTREXATE 2.5 MG/1
TABLET ORAL
Qty: 75 TABLET | Refills: 1 | Status: SHIPPED | OUTPATIENT
Start: 2024-03-19

## 2024-03-19 RX ORDER — NAPROXEN 500 MG/1
500 TABLET ORAL 2 TIMES DAILY WITH MEALS
Qty: 180 TABLET | Refills: 1 | Status: SHIPPED | OUTPATIENT
Start: 2024-03-19

## 2024-03-19 RX ORDER — FOLIC ACID 1 MG/1
1 TABLET ORAL DAILY
Qty: 90 TABLET | Refills: 1 | Status: SHIPPED | OUTPATIENT
Start: 2024-03-19

## 2024-03-19 RX ORDER — HYDROXYCHLOROQUINE SULFATE 200 MG/1
300 TABLET, FILM COATED ORAL DAILY
Qty: 135 TABLET | Refills: 1 | Status: SHIPPED | OUTPATIENT
Start: 2024-03-19 | End: 2024-09-15

## 2024-03-19 NOTE — PATIENT INSTRUCTIONS
Continue with Hydroxychloroquine 300 mg daily; get regular eye exams  Increase methotrexate to 5 tabs once a week  Continue folic acid daily  Continue naproxen twice a day as needed for joint pain, take with food  Do labs every 3 months  Consider seeing gynecology regarding increased joint pain around periods    Return to clinic in 6 months

## 2024-03-19 NOTE — PROGRESS NOTES
Assessment and Plan:  Laura Christiansen is a 33 y.o.  female who presents for follow-up of lupus, which may be progressing, based on inflammatory arthritis symptoms being uncontrolled.  Will increase her methotrexate to 12.5 mg p.o. weekly. Patient's rheumatologic disease(s) threaten long-term function if not appropriately treated.    Joint pain.  Refilled her folic acid and medications.  Repeat CBC and CMP in 06/2024 prior her next visit.    Left hand discoloration.  Not actually Raynaud's.  Advised to monitor for Raynaud's syndrome.    Proteinuria.  We will continue to monitor.    Continue with Hydroxychloroquine 300 mg daily; get regular eye exams  Increase methotrexate to 5 tabs once a week  Continue folic acid daily  Continue naproxen twice a day as needed for joint pain, take with food  Do labs every 3 months  Consider seeing gynecology regarding increased joint pain around periods    Return to clinic in 6 months    Plan:  1. Lupus (HCC)  -     hydroxychloroquine (PLAQUENIL) 200 mg tablet; Take 1.5 tablets (300 mg total) by mouth in the morning  -     folic acid (FOLVITE) 1 mg tablet; Take 1 tablet (1 mg total) by mouth daily  -     methotrexate 2.5 MG tablet; 5 tablet po weekly (take all 5 tablets on the same day every week)    2. Arthritis  -     naproxen (Naprosyn) 500 mg tablet; Take 1 tablet (500 mg total) by mouth 2 (two) times a day with meals As needed for joint pain    3. High risk medication use  -     CBC and differential; Standing  -     Comprehensive metabolic panel; Standing    High risk medication use - Benefits and risks of hydroxychloroquine, including but not limited to retinal toxicity, corneal deposits, gastrointestinal side effects, and headaches were discussed with the patient. The need for a regular eye exam to monitor for ocular toxicity while on this medication was also explained to the patient.     Benefits and risks of methotrexate use, including but not limited to gastrointestinal  disturbances such as diarrhea, hair loss, fatigue, stomatitis, leukopenia, lung hypersensitivity reaction, hepatotoxicity, and lymphoma were discussed with the patient.  CBC,CMP will be regularly monitored.  It was also explained to the patient that methotrexate is teratogenic, which is not a concern for her since she has had bilateral oophorectomy.  Patient was prescribed Folic Acid 1 mg po daily to take while on methotrexate to help prevent side effects. Patient counseled on abstinence from alcohol while using methotrexate.      RTC in 6 months      Chief Complaint  Follow-up of lupus    Rheumatic Disease Summary    HPI  Laura Christiansen is a 33-year-old female who presents today for follow-up. The patient consents to the use of PAT.    The following portions of the patient's history were reviewed and updated as appropriate: allergies, current medications, past family history, past medical history, past social history, past surgical history, and problem list.    Interval History  In the middle of 02/2024, the patient began to experience an improvement in her condition. However, her period arrived, leading to a temporary worsening of her symptoms for about a week. Following this, her condition improved again at the beginning of 03/2024, with her period expected in the next 2 to 3 days, indicating a hormonal connection. Over the past two months, she has seen a decrease in her pain levels and has reduced her use of naproxen. She has been able to resume many of her activities. Prior to her period, she experienced a flare in joint pain, which lasted until about a week after her period, during which she felt tired and unwell. Her symptoms flared up again 3 days ago, leading to a return to pain levels similar to those she experienced earlier. She currently rates her pain level as 3, having previously experienced pain levels of 5 on 03/18/2024 and 7 or 8 on 03/17/2024. She believes that her periods are the only factor that  could exacerbate her symptoms. She finds that methotrexate has been beneficial, similar to how steroids helped manage her symptoms. She also believes that the injection she received 1.5 months ago contributed to her symptom improvement. It's unclear whether the injection, prednisone, or a combination of both has been effective. She has been on methotrexate for 6 weeks, taking 4 tablets once a week. She prefers naproxen over ibuprofen and has been taking it twice daily for the past 2 to 3 days, with it only providing temporary relief. Her inflammatory markers have remained normal since she started this treatment last year, and she has not experienced any negative side effects from the vaccine or GI side effects. She takes the medication with food and has had no issues with it. She continues to take hydroxychloroquine 1.5 tablets daily and tolerates it well. Prednisone has been more effective than Medrol taper. She experiences pain in her toes, fingers, and knees around her periods, which do not swell or feel hot but are stiff in the morning. The stiffness subsides once she moves, but the pain persists. Her left knee is more painful than her right during joint flares.    In the middle of 02/2024, her left hand turned bluish-purple from the wrist to the middle of the hand. There was no tingling, and it felt normal. It was warm, and she did not feel cold or stressed. The color change lasted for about an hour before returning to normal. There was no pain, tingling, or any other abnormal sensation.    She sees her primary care provider, Dr. Carter, for annual Pap smears, which have been normal. She is not on birth control and had her tubes removed several years ago due to complications with each pregnancy, including a miscarriage after her first pregnancy, a high-risk pregnancy with a probable miscarriage, and pre-eclampsia during her third pregnancy, which led to frequent ER visits. She has not had an eye exam.    She has  2 children.    Review of Systems:   Pertinent ROS positive for fatigue and joint pain. Rest of 14 point ROS reviewed and were negative.    Home Medications:    Current Outpatient Medications:     albuterol (PROVENTIL HFA,VENTOLIN HFA) 90 mcg/act inhaler, Inhale 2 puffs every 4 (four) hours as needed for wheezing, Disp: 18 g, Rfl: 4    ARIPiprazole (ABILIFY) 5 mg tablet, Take 1 tablet (5 mg total) by mouth daily at bedtime, Disp: 30 tablet, Rfl: 1    Cholecalciferol (Vitamin D-3) 125 MCG (5000 UT) TABS, Take by mouth, Disp: , Rfl:     citalopram (CeleXA) 40 mg tablet, , Disp: , Rfl:     folic acid (FOLVITE) 1 mg tablet, Take 1 tablet (1 mg total) by mouth daily, Disp: 90 tablet, Rfl: 1    hydroxychloroquine (PLAQUENIL) 200 mg tablet, Take 1.5 tablets (300 mg total) by mouth in the morning, Disp: 135 tablet, Rfl: 1    methotrexate 2.5 MG tablet, 5 tablet po weekly (take all 5 tablets on the same day every week), Disp: 75 tablet, Rfl: 1    Multiple Vitamin (MULTIVITAMIN) tablet, Take 1 tablet by mouth daily., Disp: , Rfl:     naproxen (Naprosyn) 500 mg tablet, Take 1 tablet (500 mg total) by mouth 2 (two) times a day with meals As needed for joint pain, Disp: 180 tablet, Rfl: 1    Spiriva Respimat 1.25 MCG/ACT AERS inhaler, INHALE TWO PUFFS BY MOUTH DAILY, Disp: 4 g, Rfl: 0    Symbicort 160-4.5 MCG/ACT inhaler, INHALE TWO PUFFS BY MOUTH TWICE DAILY. RINSE MOUTH AFTER USE., Disp: 10.2 g, Rfl: 11    Butalbital-APAP-Caffeine (Fioricet) -40 MG CAPS, Take 1 capsule by mouth 4 (four) times a day as needed (headache) (Patient not taking: Reported on 9/15/2023), Disp: 20 capsule, Rfl: 0    EPINEPHrine (EPIPEN) 0.3 mg/0.3 mL SOAJ, Inject 0.3 mL (0.3 mg total) into a muscle once for 1 dose, Disp: 0.6 mL, Rfl: 0    hydrOXYzine HCL (ATARAX) 25 mg tablet, 1-3 a day as needed (Patient not taking: Reported on 1/29/2024), Disp: , Rfl:     ketoconazole (NIZORAL) 2 % cream, Apply topically daily To neck (Patient not taking:  "Reported on 9/15/2023), Disp: 30 g, Rfl: 0    ketorolac (TORADOL) 10 mg tablet, Take 1 tablet (10 mg total) by mouth every 6 (six) hours as needed for moderate pain (Patient not taking: Reported on 8/14/2023), Disp: 20 tablet, Rfl: 0    montelukast (SINGULAIR) 10 mg tablet, TAKE 1 TABLET BY MOUTH DAILY AT BEDTIME (Patient not taking: Reported on 9/15/2023), Disp: 30 tablet, Rfl: 0    predniSONE 5 mg tablet, 4 tabs x7 days, then 3 tabs x7 days, then 2 tabs x7 days, then 1 tab x7 days, then stop. (Patient not taking: Reported on 3/19/2024), Disp: 70 tablet, Rfl: 0    Objective:    Vitals:    03/19/24 0958   BP: 118/68   Pulse: 84   SpO2: 98%   Weight: 69.9 kg (154 lb)   Height: 5' 5\" (1.651 m)       Physical Exam:  Constitutional:    General: She is not in acute distress.  HENT:   Head: Normocephalic and atraumatic.   Eyes:   Conjunctiva/sclera: Conjunctivae normal.   Cardiovascular:   Rate and Rhythm: Normal rate and regular rhythm.   Heart sounds: S1 normal and S2 normal.   No friction rubs.   Pulmonary:   Effort: Pulmonary effort is normal. No respiratory distress.   Breath sounds: Normal breath sounds. No wheezing, rhonchi, or rales.   Musculoskeletal:   Cervical back: Neck supple.   Upper extremities: Second through fifth bilateral MCP, PIP, DIP tenderness.   Lower extremities: Left knee tenderness.  Skin:  Coloration: Skin is not pale.   Neurological:   Mental Status: She is alert. Mental status is at baseline.   Psychiatric:      Mood and Affect: Mood normal.      Behavior: Behavior normal.      I have personally reviewed results with the patient.    Her double-stranded DNA in 01/29/2024 has normalized.  Her urinalysis yielded some slight protein in her urine.    Imaging:   No results found.    Labs:   Orders Only on 03/16/2024   Component Date Value Ref Range Status    WBC 03/18/2024 7.31  4.31 - 10.16 Thousand/uL Final    RBC 03/18/2024 4.77  3.81 - 5.12 Million/uL Final    Hemoglobin 03/18/2024 14.8  11.5 - " 15.4 g/dL Final    Hematocrit 03/18/2024 44.7  34.8 - 46.1 % Final    MCV 03/18/2024 94  82 - 98 fL Final    MCH 03/18/2024 31.0  26.8 - 34.3 pg Final    MCHC 03/18/2024 33.1  31.4 - 37.4 g/dL Final    RDW 03/18/2024 13.2  11.6 - 15.1 % Final    MPV 03/18/2024 9.2  8.9 - 12.7 fL Final    Platelets 03/18/2024 277  149 - 390 Thousands/uL Final    nRBC 03/18/2024 0  /100 WBCs Final    Neutrophils Relative 03/18/2024 64  43 - 75 % Final    Immature Grans % 03/18/2024 0  0 - 2 % Final    Lymphocytes Relative 03/18/2024 25  14 - 44 % Final    Monocytes Relative 03/18/2024 9  4 - 12 % Final    Eosinophils Relative 03/18/2024 1  0 - 6 % Final    Basophils Relative 03/18/2024 1  0 - 1 % Final    Neutrophils Absolute 03/18/2024 4.68  1.85 - 7.62 Thousands/µL Final    Absolute Immature Grans 03/18/2024 0.01  0.00 - 0.20 Thousand/uL Final    Absolute Lymphocytes 03/18/2024 1.85  0.60 - 4.47 Thousands/µL Final    Absolute Monocytes 03/18/2024 0.62  0.17 - 1.22 Thousand/µL Final    Eosinophils Absolute 03/18/2024 0.10  0.00 - 0.61 Thousand/µL Final    Basophils Absolute 03/18/2024 0.05  0.00 - 0.10 Thousands/µL Final    Sodium 03/18/2024 139  135 - 147 mmol/L Final    Potassium 03/18/2024 4.4  3.5 - 5.3 mmol/L Final    Chloride 03/18/2024 103  96 - 108 mmol/L Final    CO2 03/18/2024 28  21 - 32 mmol/L Final    ANION GAP 03/18/2024 8  4 - 13 mmol/L Final    BUN 03/18/2024 18  5 - 25 mg/dL Final    Creatinine 03/18/2024 0.62  0.60 - 1.30 mg/dL Final    Standardized to IDMS reference method    Glucose, Fasting 03/18/2024 97  65 - 99 mg/dL Final    Calcium 03/18/2024 9.4  8.4 - 10.2 mg/dL Final    AST 03/18/2024 17  13 - 39 U/L Final    ALT 03/18/2024 15  7 - 52 U/L Final    Specimen collection should occur prior to Sulfasalazine administration due to the potential for falsely depressed results.     Alkaline Phosphatase 03/18/2024 50  34 - 104 U/L Final    Total Protein 03/18/2024 6.7  6.4 - 8.4 g/dL Final    Albumin 03/18/2024 4.4   3.5 - 5.0 g/dL Final    Total Bilirubin 03/18/2024 0.27  0.20 - 1.00 mg/dL Final    Use of this assay is not recommended for patients undergoing treatment with eltrombopag due to the potential for falsely elevated results.  N-acetyl-p-benzoquinone imine (metabolite of Acetaminophen) will generate erroneously low results in samples for patients that have taken an overdose of Acetaminophen.    eGFR 03/18/2024 118  ml/min/1.73sq m Final    CRP 03/18/2024 <1.0  <3.0 mg/L Final    Sed Rate 03/18/2024 <1  0 - 19 mm/hour Final   Office Visit on 01/29/2024   Component Date Value Ref Range Status    WBC 01/29/2024 4.90  4.31 - 10.16 Thousand/uL Final    RBC 01/29/2024 4.93  3.81 - 5.12 Million/uL Final    Hemoglobin 01/29/2024 15.4  11.5 - 15.4 g/dL Final    Hematocrit 01/29/2024 46.0  34.8 - 46.1 % Final    MCV 01/29/2024 93  82 - 98 fL Final    MCH 01/29/2024 31.2  26.8 - 34.3 pg Final    MCHC 01/29/2024 33.5  31.4 - 37.4 g/dL Final    RDW 01/29/2024 12.7  11.6 - 15.1 % Final    MPV 01/29/2024 8.9  8.9 - 12.7 fL Final    Platelets 01/29/2024 239  149 - 390 Thousands/uL Final    nRBC 01/29/2024 0  /100 WBCs Final    Neutrophils Relative 01/29/2024 71  43 - 75 % Final    Immature Grans % 01/29/2024 0  0 - 2 % Final    Lymphocytes Relative 01/29/2024 18  14 - 44 % Final    Monocytes Relative 01/29/2024 8  4 - 12 % Final    Eosinophils Relative 01/29/2024 2  0 - 6 % Final    Basophils Relative 01/29/2024 1  0 - 1 % Final    Neutrophils Absolute 01/29/2024 3.48  1.85 - 7.62 Thousands/µL Final    Absolute Immature Grans 01/29/2024 0.01  0.00 - 0.20 Thousand/uL Final    Absolute Lymphocytes 01/29/2024 0.90  0.60 - 4.47 Thousands/µL Final    Absolute Monocytes 01/29/2024 0.37  0.17 - 1.22 Thousand/µL Final    Eosinophils Absolute 01/29/2024 0.09  0.00 - 0.61 Thousand/µL Final    Basophils Absolute 01/29/2024 0.05  0.00 - 0.10 Thousands/µL Final    C4, COMPLEMENT 01/29/2024 24  19 - 52 mg/dL Final    C3 Complement 01/29/2024 106   87 - 200 mg/dL Final    ds DNA Ab 01/29/2024 9  0 - 9 IU/mL Final                                       Negative      <5                                     Equivocal  5 - 9                                     Positive      >9    Sodium 01/29/2024 138  135 - 147 mmol/L Final    Potassium 01/29/2024 4.4  3.5 - 5.3 mmol/L Final    Chloride 01/29/2024 104  96 - 108 mmol/L Final    CO2 01/29/2024 27  21 - 32 mmol/L Final    ANION GAP 01/29/2024 7  mmol/L Final    BUN 01/29/2024 22  5 - 25 mg/dL Final    Creatinine 01/29/2024 0.73  0.60 - 1.30 mg/dL Final    Standardized to IDMS reference method    Glucose 01/29/2024 95  65 - 140 mg/dL Final    If the patient is fasting, the ADA then defines impaired fasting glucose as > 100 mg/dL and diabetes as > or equal to 123 mg/dL.    Calcium 01/29/2024 9.2  8.4 - 10.2 mg/dL Final    AST 01/29/2024 13  13 - 39 U/L Final    ALT 01/29/2024 10  7 - 52 U/L Final    Specimen collection should occur prior to Sulfasalazine administration due to the potential for falsely depressed results.     Alkaline Phosphatase 01/29/2024 37  34 - 104 U/L Final    Total Protein 01/29/2024 6.6  6.4 - 8.4 g/dL Final    Albumin 01/29/2024 4.5  3.5 - 5.0 g/dL Final    Total Bilirubin 01/29/2024 0.40  0.20 - 1.00 mg/dL Final    Use of this assay is not recommended for patients undergoing treatment with eltrombopag due to the potential for falsely elevated results.  N-acetyl-p-benzoquinone imine (metabolite of Acetaminophen) will generate erroneously low results in samples for patients that have taken an overdose of Acetaminophen.    eGFR 01/29/2024 108  ml/min/1.73sq m Final    CRP 01/29/2024 <1.0  <3.0 mg/L Final    Color, UA 01/29/2024 Orange   Final    Clarity, UA 01/29/2024 Extra Turbid   Final    Specific Gravity, UA 01/29/2024 1.033 (H)  1.003 - 1.030 Final    pH, UA 01/29/2024 6.0  4.5, 5.0, 5.5, 6.0, 6.5, 7.0, 7.5, 8.0 Final    Leukocytes, UA 01/29/2024 Negative  Negative Final    Nitrite, UA  01/29/2024 Negative  Negative Final    Protein, UA 01/29/2024 100 (2+) (A)  Negative mg/dl Final    Glucose, UA 01/29/2024 Negative  Negative mg/dl Final    Ketones, UA 01/29/2024 Negative  Negative mg/dl Final    Urobilinogen, UA 01/29/2024 2.0 (A)  <2.0 mg/dl mg/dl Final    Bilirubin, UA 01/29/2024 Negative  Negative Final    Occult Blood, UA 01/29/2024 Trace (A)  Negative Final    RBC, UA 01/29/2024 None Seen  None Seen, 1-2 /hpf Final    WBC, UA 01/29/2024 None Seen  None Seen, 1-2 /hpf Final    Epithelial Cells 01/29/2024 Occasional  None Seen, Occasional /hpf Final    Bacteria, UA 01/29/2024 Occasional  None Seen, Occasional /hpf Final    MUCUS THREADS 01/29/2024 Innumerable (A)  None Seen Final    Ca Oxalate Ines, UA 01/29/2024 Occasional (A)  None Seen /hpf Final    Sed Rate 01/29/2024 <1  0 - 19 mm/hour Final    Creatinine, Ur 01/29/2024 213.4  Reference range not established. mg/dL Final    Protein Urine Random 01/29/2024 26  Reference range not established. mg/dL Final    Prot/Creat Ratio, Ur 01/29/2024 0.12 (H)  0.00 - 0.10 Final   Appointment on 01/05/2024   Component Date Value Ref Range Status    WBC 01/05/2024 3.80 (L)  4.31 - 10.16 Thousand/uL Final    RBC 01/05/2024 4.92  3.81 - 5.12 Million/uL Final    Hemoglobin 01/05/2024 15.0  11.5 - 15.4 g/dL Final    Hematocrit 01/05/2024 45.4  34.8 - 46.1 % Final    MCV 01/05/2024 92  82 - 98 fL Final    MCH 01/05/2024 30.5  26.8 - 34.3 pg Final    MCHC 01/05/2024 33.0  31.4 - 37.4 g/dL Final    RDW 01/05/2024 12.6  11.6 - 15.1 % Final    MPV 01/05/2024 9.2  8.9 - 12.7 fL Final    Platelets 01/05/2024 269  149 - 390 Thousands/uL Final    nRBC 01/05/2024 0  /100 WBCs Final    Neutrophils Relative 01/05/2024 41 (L)  43 - 75 % Final    Immature Grans % 01/05/2024 0  0 - 2 % Final    Lymphocytes Relative 01/05/2024 42  14 - 44 % Final    Monocytes Relative 01/05/2024 11  4 - 12 % Final    Eosinophils Relative 01/05/2024 5  0 - 6 % Final    Basophils Relative  01/05/2024 1  0 - 1 % Final    Neutrophils Absolute 01/05/2024 1.56 (L)  1.85 - 7.62 Thousands/µL Final    Absolute Immature Grans 01/05/2024 0.01  0.00 - 0.20 Thousand/uL Final    Absolute Lymphocytes 01/05/2024 1.59  0.60 - 4.47 Thousands/µL Final    Absolute Monocytes 01/05/2024 0.43  0.17 - 1.22 Thousand/µL Final    Eosinophils Absolute 01/05/2024 0.18  0.00 - 0.61 Thousand/µL Final    Basophils Absolute 01/05/2024 0.03  0.00 - 0.10 Thousands/µL Final    Sodium 01/05/2024 140  135 - 147 mmol/L Final    Potassium 01/05/2024 4.4  3.5 - 5.3 mmol/L Final    Chloride 01/05/2024 104  96 - 108 mmol/L Final    CO2 01/05/2024 30  21 - 32 mmol/L Final    ANION GAP 01/05/2024 6  mmol/L Final    BUN 01/05/2024 13  5 - 25 mg/dL Final    Creatinine 01/05/2024 0.72  0.60 - 1.30 mg/dL Final    Standardized to IDMS reference method    Glucose, Fasting 01/05/2024 90  65 - 99 mg/dL Final    Calcium 01/05/2024 9.1  8.4 - 10.2 mg/dL Final    AST 01/05/2024 20  13 - 39 U/L Final    ALT 01/05/2024 12  7 - 52 U/L Final    Specimen collection should occur prior to Sulfasalazine administration due to the potential for falsely depressed results.     Alkaline Phosphatase 01/05/2024 38  34 - 104 U/L Final    Total Protein 01/05/2024 6.8  6.4 - 8.4 g/dL Final    Albumin 01/05/2024 4.3  3.5 - 5.0 g/dL Final    Total Bilirubin 01/05/2024 0.35  0.20 - 1.00 mg/dL Final    Use of this assay is not recommended for patients undergoing treatment with eltrombopag due to the potential for falsely elevated results.  N-acetyl-p-benzoquinone imine (metabolite of Acetaminophen) will generate erroneously low results in samples for patients that have taken an overdose of Acetaminophen.    eGFR 01/05/2024 110  ml/min/1.73sq m Final    TSH 3RD GENERATON 01/05/2024 2.379  0.450 - 4.500 uIU/mL Final    The recommended reference ranges for TSH during pregnancy are as follows:   First trimester 0.100 to 2.500 uIU/mL   Second trimester  0.200 to 3.000 uIU/mL    Third trimester 0.300 to 3.000 uIU/m    Note: Normal ranges may not apply to patients who are transgender, non-binary, or whose legal sex, sex at birth, and gender identity differ.  Adult TSH (3rd generation) reference range follows the recommended guidelines of the American Thyroid Association, January, 2020.    Cholesterol 01/05/2024 170  See Comment mg/dL Final    Cholesterol:         Pediatric <18 Years        Desirable          <170 mg/dL      Borderline High    170-199 mg/dL      High               >=200 mg/dL        Adult >=18 Years            Desirable         <200 mg/dL      Borderline High   200-239 mg/dL      High              >239 mg/dL      Triglycerides 01/05/2024 67  See Comment mg/dL Final    Triglyceride:     0-9Y            <75mg/dL     10Y-17Y         <90 mg/dL       >=18Y     Normal          <150 mg/dL     Borderline High 150-199 mg/dL     High            200-499 mg/dL        Very High       >499 mg/dL    Specimen collection should occur prior to Metamizole administration due to the potential for falsely depressed results.    HDL, Direct 01/05/2024 51  >=50 mg/dL Final    LDL Calculated 01/05/2024 106 (H)  0 - 100 mg/dL Final    LDL Cholesterol:     Optimal           <100 mg/dl     Near Optimal      100-129 mg/dl     Above Optimal       Borderline High 130-159 mg/dl       High            160-189 mg/dl       Very High       >189 mg/dl         This screening LDL is a calculated result.   It does not have the accuracy of the Direct Measured LDL in the monitoring of patients with hyperlipidemia and/or statin therapy.   Direct Measure LDL (RNO763) must be ordered separately in these patients.    Non-HDL-Chol (CHOL-HDL) 01/05/2024 119  mg/dl Final    Vit D, 25-Hydroxy 01/05/2024 47.4  30.0 - 100.0 ng/mL Final    Vitamin D guidelines established by Clinical Guidelines Subcommittee  of the Endocrine Society Task Force, 2011    Deficiency <20ng/ml   Insufficiency 20-30ng/ml   Sufficient  ng/ml      Color, UA 01/05/2024 Light Yellow   Final    Clarity, UA 01/05/2024 Clear   Final    Specific Gravity, UA 01/05/2024 1.010  1.003 - 1.030 Final    pH, UA 01/05/2024 7.0  4.5, 5.0, 5.5, 6.0, 6.5, 7.0, 7.5, 8.0 Final    Leukocytes, UA 01/05/2024 Negative  Negative Final    Nitrite, UA 01/05/2024 Negative  Negative Final    Protein, UA 01/05/2024 Negative  Negative mg/dl Final    Glucose, UA 01/05/2024 Negative  Negative mg/dl Final    Ketones, UA 01/05/2024 Negative  Negative mg/dl Final    Urobilinogen, UA 01/05/2024 <2.0  <2.0 mg/dl mg/dl Final    Bilirubin, UA 01/05/2024 Negative  Negative Final    Occult Blood, UA 01/05/2024 Negative  Negative Final       Transcribed for Marilyn Gee MD, by  Geraldson Reyes Bihag on 03/29/24 at 1:32 PM. Powered by [employer].

## 2024-03-22 ENCOUNTER — APPOINTMENT (OUTPATIENT)
Dept: LAB | Facility: CLINIC | Age: 34
End: 2024-03-22
Payer: COMMERCIAL

## 2024-03-22 ENCOUNTER — OFFICE VISIT (OUTPATIENT)
Dept: OBGYN CLINIC | Facility: CLINIC | Age: 34
End: 2024-03-22
Payer: COMMERCIAL

## 2024-03-22 VITALS
BODY MASS INDEX: 25.76 KG/M2 | SYSTOLIC BLOOD PRESSURE: 112 MMHG | DIASTOLIC BLOOD PRESSURE: 60 MMHG | HEIGHT: 65 IN | WEIGHT: 154.6 LBS

## 2024-03-22 DIAGNOSIS — N93.9 ABNORMAL UTERINE BLEEDING (AUB): ICD-10-CM

## 2024-03-22 DIAGNOSIS — N94.6 DYSMENORRHEA: Primary | ICD-10-CM

## 2024-03-22 LAB
ESTRADIOL SERPL-MCNC: 75.1 PG/ML
FSH SERPL-ACNC: 8.7 MIU/ML
LH SERPL-ACNC: 4.4 MIU/ML
PROLACTIN SERPL-MCNC: 26.9 NG/ML (ref 3.34–26.72)
TESTOST SERPL-MSCNC: 13 NG/DL
TSH SERPL DL<=0.05 MIU/L-ACNC: 2.04 UIU/ML (ref 0.45–4.5)

## 2024-03-22 PROCEDURE — 82670 ASSAY OF TOTAL ESTRADIOL: CPT

## 2024-03-22 PROCEDURE — 36415 COLL VENOUS BLD VENIPUNCTURE: CPT

## 2024-03-22 PROCEDURE — 84443 ASSAY THYROID STIM HORMONE: CPT

## 2024-03-22 PROCEDURE — 83001 ASSAY OF GONADOTROPIN (FSH): CPT

## 2024-03-22 PROCEDURE — 83002 ASSAY OF GONADOTROPIN (LH): CPT

## 2024-03-22 PROCEDURE — 84146 ASSAY OF PROLACTIN: CPT

## 2024-03-22 PROCEDURE — 83498 ASY HYDROXYPROGESTERONE 17-D: CPT

## 2024-03-22 PROCEDURE — 99203 OFFICE O/P NEW LOW 30 MIN: CPT | Performed by: OBSTETRICS & GYNECOLOGY

## 2024-03-22 PROCEDURE — 84403 ASSAY OF TOTAL TESTOSTERONE: CPT

## 2024-03-22 NOTE — PROGRESS NOTES
"Subjective:     Laura Christiansen is a 33 y.o.  female who presents for menorrhagia and dysmenorrhea. She states this change in her menstrual cycle occurred 3 months ago. Her bleeding is now 7 days from 3-4 days.  She did notice clots during her menstrual cycle last month. She uses tampons and changes them every 1-1.5 hours. She started her periods at age 11 and has a tuba ligation 6 years ago. She also reports significant dysmenorrhea that is not responsive to naproxen and can keep her stuck in bed. She was recently diagnosed with lupus in 2023 and take methotrexate and plaquenil. Pelvic ultrasound in  was normal. Her pap smear in  was normal.    Objective:    Vitals: Blood pressure 112/60, height 5' 5\" (1.651 m), weight 70.1 kg (154 lb 9.6 oz), last menstrual period 2024.Body mass index is 25.73 kg/m².    Physical Exam  Vitals reviewed.   Constitutional:       Appearance: Normal appearance.   Cardiovascular:      Rate and Rhythm: Normal rate.   Pulmonary:      Effort: Pulmonary effort is normal.   Abdominal:      Palpations: Abdomen is soft.   Musculoskeletal:         General: No swelling or tenderness.   Skin:     General: Skin is warm and dry.   Neurological:      Mental Status: She is alert and oriented to person, place, and time.   Psychiatric:         Mood and Affect: Mood normal.         Assessment/Plan:    Menorrhagia and Dysmenorrhea  -Lab work and pelvic ultrasound ordered. Briefly discussed that medical management is limited in the setting of her lupus but we could consider endometrial ablation if labs and ultrasound return normal.        Shaylee Hayden MD  3/22/2024  1:16 PM         "

## 2024-03-26 LAB — 17OHP SERPL-MCNC: 34 NG/DL

## 2024-04-02 DIAGNOSIS — J45.50 SEVERE PERSISTENT ASTHMA WITHOUT COMPLICATION: Primary | ICD-10-CM

## 2024-04-09 ENCOUNTER — APPOINTMENT (OUTPATIENT)
Dept: LAB | Facility: CLINIC | Age: 34
End: 2024-04-09
Payer: COMMERCIAL

## 2024-04-09 ENCOUNTER — OFFICE VISIT (OUTPATIENT)
Dept: FAMILY MEDICINE CLINIC | Facility: CLINIC | Age: 34
End: 2024-04-09
Payer: COMMERCIAL

## 2024-04-09 VITALS
OXYGEN SATURATION: 98 % | BODY MASS INDEX: 25.52 KG/M2 | SYSTOLIC BLOOD PRESSURE: 92 MMHG | HEIGHT: 65 IN | WEIGHT: 153.2 LBS | TEMPERATURE: 98.1 F | DIASTOLIC BLOOD PRESSURE: 70 MMHG | HEART RATE: 90 BPM

## 2024-04-09 DIAGNOSIS — J01.90 ACUTE SINUSITIS, RECURRENCE NOT SPECIFIED, UNSPECIFIED LOCATION: Primary | ICD-10-CM

## 2024-04-09 DIAGNOSIS — K92.1 BLOOD IN STOOL: ICD-10-CM

## 2024-04-09 LAB
BASOPHILS # BLD AUTO: 0.05 THOUSANDS/ÂΜL (ref 0–0.1)
BASOPHILS NFR BLD AUTO: 1 % (ref 0–1)
EOSINOPHIL # BLD AUTO: 0.33 THOUSAND/ÂΜL (ref 0–0.61)
EOSINOPHIL NFR BLD AUTO: 5 % (ref 0–6)
ERYTHROCYTE [DISTWIDTH] IN BLOOD BY AUTOMATED COUNT: 13 % (ref 11.6–15.1)
HCT VFR BLD AUTO: 45 % (ref 34.8–46.1)
HGB BLD-MCNC: 15 G/DL (ref 11.5–15.4)
IMM GRANULOCYTES # BLD AUTO: 0.02 THOUSAND/UL (ref 0–0.2)
IMM GRANULOCYTES NFR BLD AUTO: 0 % (ref 0–2)
LYMPHOCYTES # BLD AUTO: 1.69 THOUSANDS/ÂΜL (ref 0.6–4.47)
LYMPHOCYTES NFR BLD AUTO: 25 % (ref 14–44)
MCH RBC QN AUTO: 31.5 PG (ref 26.8–34.3)
MCHC RBC AUTO-ENTMCNC: 33.3 G/DL (ref 31.4–37.4)
MCV RBC AUTO: 95 FL (ref 82–98)
MONOCYTES # BLD AUTO: 0.68 THOUSAND/ÂΜL (ref 0.17–1.22)
MONOCYTES NFR BLD AUTO: 10 % (ref 4–12)
NEUTROPHILS # BLD AUTO: 4.1 THOUSANDS/ÂΜL (ref 1.85–7.62)
NEUTS SEG NFR BLD AUTO: 59 % (ref 43–75)
NRBC BLD AUTO-RTO: 0 /100 WBCS
PLATELET # BLD AUTO: 252 THOUSANDS/UL (ref 149–390)
PMV BLD AUTO: 9.1 FL (ref 8.9–12.7)
RBC # BLD AUTO: 4.76 MILLION/UL (ref 3.81–5.12)
WBC # BLD AUTO: 6.87 THOUSAND/UL (ref 4.31–10.16)

## 2024-04-09 PROCEDURE — 36415 COLL VENOUS BLD VENIPUNCTURE: CPT

## 2024-04-09 PROCEDURE — 85025 COMPLETE CBC W/AUTO DIFF WBC: CPT

## 2024-04-09 PROCEDURE — 99213 OFFICE O/P EST LOW 20 MIN: CPT | Performed by: NURSE PRACTITIONER

## 2024-04-09 RX ORDER — DOXYCYCLINE HYCLATE 100 MG/1
100 CAPSULE ORAL EVERY 12 HOURS SCHEDULED
Qty: 14 CAPSULE | Refills: 0 | Status: SHIPPED | OUTPATIENT
Start: 2024-04-09 | End: 2024-04-16

## 2024-04-09 NOTE — ASSESSMENT & PLAN NOTE
Will check CBC today  Referral to GI - comprehensive evaluation  Return to care guidance with further concerns prior to specialist eval

## 2024-04-09 NOTE — ASSESSMENT & PLAN NOTE
With persistent symptoms and noted PMH, will tx today; abx as ordered - doxycycline (dose/use/potential side effects reviewed); advised on supportive care - rest, hydrate, continue rescue inhaler as needed (note lungs clear today); Advise daily probiotic; discussed steroids - she will call in next day or two if symptoms do not improve with abx alone; f/u guidance given should sx not improve

## 2024-04-09 NOTE — PROGRESS NOTES
"[facility]    ASSESSMENT AND PLAN     1. Acute sinusitis, recurrence not specified, unspecified location  Assessment & Plan:  With persistent symptoms and noted PMH, will tx today; abx as ordered - doxycycline (dose/use/potential side effects reviewed); advised on supportive care - rest, hydrate, continue rescue inhaler as needed (note lungs clear today); Advise daily probiotic; discussed steroids - she will call in next day or two if symptoms do not improve with abx alone; f/u guidance given should sx not improve      Orders:  -     doxycycline hyclate (VIBRAMYCIN) 100 mg capsule; Take 1 capsule (100 mg total) by mouth every 12 (twelve) hours for 7 days    2. Blood in stool  Assessment & Plan:  Will check CBC today  Referral to GI - comprehensive evaluation  Return to care guidance with further concerns prior to specialist eval    Orders:  -     CBC and differential; Future  -     Ambulatory Referral to Gastroenterology; Future           SUBJECTIVE       Patient ID: Laura Christiansen is a 33 y.o. female.    Chief Complaint   Patient presents with    Cold Like Symptoms    Sinusitis    Cough    Rectal Bleeding    chest congestion    Fever       HISTORY OF PRESENT ILLNESS    Acute visit  Symptoms started Friday. Sinus pressure/congestion, cough, fever (Tmax 101.1 yesterday - improves with Tylenol). Reports some mild symptoms improvement Sunday but started again Monday and now worse today. Feels like it is in her chest. Has history asthma - requiring rescue inhaler more often. Notes changes in her peak flows. No Gi issues.  Family with same symptoms over last few weeks. They are improving  Second concern: Noted blood (bright red) in stool earlier this am. Reports it looked like a \"ribbon or blood\". Has blood on the stool in the past - history of prior fissure with constipation. No pain with the bowel movement today. Reports was in the stool. No blood with wiping. Had colonoscopy about 6 years ago for abdominal " "pain/bloating. Diagnosed with non celiac gluten sensitivity.           The following portions of the patient's history were reviewed and updated as appropriate: allergies, current medications, past family history, past medical history, past social history, past surgical history, and problem list.    REVIEW OF SYSTEMS  Review of Systems   Constitutional:  Positive for chills, fatigue and fever. Negative for activity change and appetite change.   HENT:  Positive for congestion, postnasal drip, sinus pressure, sinus pain and sore throat. Negative for ear pain.    Respiratory:  Positive for cough, chest tightness and wheezing.    Cardiovascular: Negative.    Gastrointestinal:  Positive for blood in stool. Negative for abdominal pain, constipation, diarrhea and nausea.   Genitourinary: Negative.    Neurological:  Positive for headaches (sinus). Negative for dizziness.   Psychiatric/Behavioral: Negative.         OBJECTIVE      VITAL SIGNS  BP 92/70 (BP Location: Left arm, Patient Position: Sitting, Cuff Size: Adult)   Pulse 90   Temp 98.1 °F (36.7 °C)   Ht 5' 5\" (1.651 m)   Wt 69.5 kg (153 lb 3.2 oz)   LMP 03/20/2024 (Exact Date)   SpO2 98%   BMI 25.49 kg/m²     CURRENT MEDICATIONS    Current Outpatient Medications:     albuterol (PROVENTIL HFA,VENTOLIN HFA) 90 mcg/act inhaler, Inhale 2 puffs every 4 (four) hours as needed for wheezing, Disp: 18 g, Rfl: 4    ARIPiprazole (ABILIFY) 5 mg tablet, Take 1 tablet (5 mg total) by mouth daily at bedtime, Disp: 30 tablet, Rfl: 1    Cholecalciferol (Vitamin D-3) 125 MCG (5000 UT) TABS, Take by mouth, Disp: , Rfl:     citalopram (CeleXA) 40 mg tablet, , Disp: , Rfl:     doxycycline hyclate (VIBRAMYCIN) 100 mg capsule, Take 1 capsule (100 mg total) by mouth every 12 (twelve) hours for 7 days, Disp: 14 capsule, Rfl: 0    folic acid (FOLVITE) 1 mg tablet, Take 1 tablet (1 mg total) by mouth daily, Disp: 90 tablet, Rfl: 1    hydroxychloroquine (PLAQUENIL) 200 mg tablet, Take 1.5 " tablets (300 mg total) by mouth in the morning, Disp: 135 tablet, Rfl: 1    methotrexate 2.5 MG tablet, 5 tablet po weekly (take all 5 tablets on the same day every week), Disp: 75 tablet, Rfl: 1    Multiple Vitamin (MULTIVITAMIN) tablet, Take 1 tablet by mouth daily., Disp: , Rfl:     naproxen (Naprosyn) 500 mg tablet, Take 1 tablet (500 mg total) by mouth 2 (two) times a day with meals As needed for joint pain, Disp: 180 tablet, Rfl: 1    Spiriva Respimat 1.25 MCG/ACT AERS inhaler, INHALE TWO PUFFS BY MOUTH DAILY, Disp: 4 g, Rfl: 0    Symbicort 160-4.5 MCG/ACT inhaler, INHALE TWO PUFFS BY MOUTH TWICE DAILY. RINSE MOUTH AFTER USE., Disp: 10.2 g, Rfl: 11    Butalbital-APAP-Caffeine (Fioricet) -40 MG CAPS, Take 1 capsule by mouth 4 (four) times a day as needed (headache) (Patient not taking: Reported on 9/15/2023), Disp: 20 capsule, Rfl: 0    EPINEPHrine (EPIPEN) 0.3 mg/0.3 mL SOAJ, Inject 0.3 mL (0.3 mg total) into a muscle once for 1 dose, Disp: 0.6 mL, Rfl: 0    hydrOXYzine HCL (ATARAX) 25 mg tablet, 1-3 a day as needed (Patient not taking: Reported on 1/29/2024), Disp: , Rfl:     ketoconazole (NIZORAL) 2 % cream, Apply topically daily To neck (Patient not taking: Reported on 9/15/2023), Disp: 30 g, Rfl: 0    ketorolac (TORADOL) 10 mg tablet, Take 1 tablet (10 mg total) by mouth every 6 (six) hours as needed for moderate pain (Patient not taking: Reported on 8/14/2023), Disp: 20 tablet, Rfl: 0    montelukast (SINGULAIR) 10 mg tablet, TAKE 1 TABLET BY MOUTH DAILY AT BEDTIME (Patient not taking: Reported on 9/15/2023), Disp: 30 tablet, Rfl: 0    predniSONE 5 mg tablet, 4 tabs x7 days, then 3 tabs x7 days, then 2 tabs x7 days, then 1 tab x7 days, then stop. (Patient not taking: Reported on 3/19/2024), Disp: 70 tablet, Rfl: 0      PHYSICAL EXAMINATION   Physical Exam  Vitals and nursing note reviewed.   Constitutional:       General: She is not in acute distress.     Appearance: Normal appearance. She is  well-developed and well-groomed. She is not ill-appearing.   HENT:      Head: Normocephalic.      Right Ear: Tympanic membrane and ear canal normal.      Left Ear: Tympanic membrane and ear canal normal.      Ears:      Comments: Canals mild red     Nose: Mucosal edema, congestion and rhinorrhea present. Rhinorrhea is purulent.      Right Turbinates: Swollen.      Right Sinus: Maxillary sinus tenderness and frontal sinus tenderness present.      Left Sinus: Maxillary sinus tenderness and frontal sinus tenderness present.      Mouth/Throat:      Mouth: Mucous membranes are moist.      Pharynx: Posterior oropharyngeal erythema present. No oropharyngeal exudate.   Cardiovascular:      Rate and Rhythm: Normal rate and regular rhythm.   Pulmonary:      Effort: Pulmonary effort is normal. No respiratory distress.      Breath sounds: Normal breath sounds and air entry. No wheezing or rhonchi.   Lymphadenopathy:      Cervical: No cervical adenopathy.   Skin:     General: Skin is warm and dry.   Neurological:      General: No focal deficit present.      Mental Status: She is alert and oriented to person, place, and time.   Psychiatric:         Attention and Perception: Attention normal.         Mood and Affect: Mood normal.         Behavior: Behavior normal.

## 2024-04-11 ENCOUNTER — TELEPHONE (OUTPATIENT)
Age: 34
End: 2024-04-11

## 2024-04-11 NOTE — TELEPHONE ENCOUNTER
Patient calling in with update. Saw aniya two days ago and was told to call back if no improvments. Patient states only thing that has slightly improved is sinus pressure. She is still sob when walking, cough, asthma attacks. States she was told she might need steroids. Please advise.

## 2024-04-11 NOTE — TELEPHONE ENCOUNTER
Pt called in again inquiring about previous message. States still no improvement. Has been checking pulse ox, staying at 95%, and peak flow meter staying out of red zone. States she has used rescue inhaler twice today. Please advise. Pt encouraged to go to urgent care or ED with any worsening symptoms overnight. Pt verbalized understanding.

## 2024-04-12 NOTE — TELEPHONE ENCOUNTER
Patient called back agreed to start steroid medication.    Pharmacy [facility].    Please review and advice.   Thank You.

## 2024-04-13 DIAGNOSIS — J45.41 MODERATE PERSISTENT ASTHMA WITH ACUTE EXACERBATION: ICD-10-CM

## 2024-04-13 DIAGNOSIS — J22 LOWER RESPIRATORY INFECTION: Primary | ICD-10-CM

## 2024-04-13 RX ORDER — METHYLPREDNISOLONE 4 MG/1
TABLET ORAL
Qty: 21 EACH | Refills: 0 | Status: SHIPPED | OUTPATIENT
Start: 2024-04-13

## 2024-04-13 NOTE — TELEPHONE ENCOUNTER
Spoke with pt. Still with cough, using Albuterol few times daily. Home O2 sat maintained at 95. Medrol sent. Strict follow up/ ER precautions

## 2024-04-26 ENCOUNTER — OFFICE VISIT (OUTPATIENT)
Dept: OBGYN CLINIC | Facility: CLINIC | Age: 34
End: 2024-04-26
Payer: COMMERCIAL

## 2024-04-26 VITALS
HEIGHT: 65 IN | DIASTOLIC BLOOD PRESSURE: 72 MMHG | WEIGHT: 153.8 LBS | SYSTOLIC BLOOD PRESSURE: 122 MMHG | BODY MASS INDEX: 25.62 KG/M2

## 2024-04-26 DIAGNOSIS — R10.2 ACUTE PELVIC PAIN, FEMALE: Primary | ICD-10-CM

## 2024-04-26 DIAGNOSIS — N94.6 DYSMENORRHEA: ICD-10-CM

## 2024-04-26 DIAGNOSIS — N92.0 MENORRHAGIA WITH REGULAR CYCLE: ICD-10-CM

## 2024-04-26 DIAGNOSIS — R82.90 ABNORMAL URINALYSIS: ICD-10-CM

## 2024-04-26 LAB
SL AMB  POCT GLUCOSE, UA: NORMAL
SL AMB LEUKOCYTE ESTERASE,UA: NORMAL
SL AMB POCT BILIRUBIN,UA: NORMAL
SL AMB POCT BLOOD,UA: NORMAL
SL AMB POCT CLARITY,UA: CLEAR
SL AMB POCT COLOR,UA: YELLOW
SL AMB POCT KETONES,UA: NORMAL
SL AMB POCT NITRITE,UA: NORMAL
SL AMB POCT PH,UA: 7.5
SL AMB POCT SPECIFIC GRAVITY,UA: 1.01
SL AMB POCT URINE PROTEIN: NORMAL
SL AMB POCT UROBILINOGEN: 0.2

## 2024-04-26 PROCEDURE — 81002 URINALYSIS NONAUTO W/O SCOPE: CPT | Performed by: NURSE PRACTITIONER

## 2024-04-26 PROCEDURE — 99214 OFFICE O/P EST MOD 30 MIN: CPT | Performed by: NURSE PRACTITIONER

## 2024-04-26 RX ORDER — ACETAMINOPHEN AND CODEINE PHOSPHATE 120; 12 MG/5ML; MG/5ML
1 SOLUTION ORAL DAILY
Qty: 84 TABLET | Refills: 1 | Status: SHIPPED | OUTPATIENT
Start: 2024-04-26

## 2024-04-26 NOTE — PROGRESS NOTES
Assessment/Plan:    1. Acute pelvic pain, female    - US pelvis complete w transvaginal; Future    2. Dysmenorrhea    - norethindrone (Ortho Micronor) 0.35 MG tablet; Take 1 tablet (0.35 mg total) by mouth daily  Dispense: 84 tablet; Refill: 1    3. Menorrhagia with regular cycle  Start  progesterone only pill, given lupus, until she can get in to see Dr. Hayden.    - norethindrone (Ortho Micronor) 0.35 MG tablet; Take 1 tablet (0.35 mg total) by mouth daily  Dispense: 84 tablet; Refill: 1    4. Abnormal urinalysis  Negative dip today.    - POCT urine dip    Reviewed ED labs and imaging findings with patient in detail.  + u/a in ED-- repeat dip today was negative.  STAT U/S today to evaluate her RLQ acute pain.  Willing to start  progesterone only pill for suppression of ovulation/ menstrual management.  Already taking high dose NSAIDs for her Lupus.  Will f/u at ED if her pain becomes unbearable, as appendicitis remains a possible diagnosis.  RV with Dr Hayden for further assessment/ management.    I have spent a total time of 40 minutes on 24 in caring for this patient including Diagnostic results, Risks and benefits of tx options, Instructions for management, Impressions, Counseling / Coordination of care, Documenting in the medical record, Reviewing / ordering tests, medicine, procedures  , and Obtaining or reviewing history  .      Subjective:      Patient ID: Laura Christiansen is a 33 y.o. female.    HPI  PROBLEM VISIT  CC: acute pelvic pain    32 yo     Presents today due to reoccurring of pelvic pain.  RLQ, constant period cramp throughout entire pelvis, then a sharp wave of pain which bolls her over.  At worst becomes a 7, at its least a 4.  Taking aleve which helps only marginally.  Today is cycle day 14 of her menstrual cycle.  She is concerned because she was seen in the ED on 24 at [facility] for acute severe RLQ pain for which presumed diagnosis was a ruptured ovarian cyst.  24 pelvic US: uterus  "9.1 x 4.5 x 5.1 cm, ovaries normal without masses/ small follicles; + physiologic free fluid.  4/2/24 CT scan: 2.1 x 1.5 cm left CL cyst;  appendicitis ruled out after a couple days of observation.  4/2/24 UA + leukos, protein, bacteria      Seen by Dr Hayden on 3/22/24 with c/o menorrhagia with dysmenorrhea.  Labs and pelvic US were ordered.  Patient was to RTO to discuss results and mgt.  She had this scheduled 4/11/24, however she was unable to make it due to illness.     Hx BTL  cholecystectomy  Lupus, dxd 9/2023, txed with MTX and plaquenil    The following portions of the patient's history were reviewed and updated as appropriate: allergies, current medications, past family history, past medical history, past social history, past surgical history, and problem list.    Review of Systems   Constitutional:  Negative for chills and fever.   Gastrointestinal:  Negative for constipation, diarrhea, nausea and vomiting.   Genitourinary:  Positive for frequency, menstrual problem, pelvic pain and vaginal discharge (clear, no odor). Negative for difficulty urinating, dysuria, hematuria and urgency.         Objective:      /72 (BP Location: Left arm, Patient Position: Sitting, Cuff Size: Standard)   Ht 5' 5\" (1.651 m)   Wt 69.8 kg (153 lb 12.8 oz)   LMP 04/13/2024 (Exact Date)   BMI 25.59 kg/m²     Urine dip today negative     Physical Exam  Constitutional:       General: She is not in acute distress.     Appearance: Normal appearance. She is well-developed and normal weight. She is not ill-appearing or diaphoretic.   HENT:      Head: Normocephalic and atraumatic.   Eyes:      Pupils: Pupils are equal, round, and reactive to light.   Pulmonary:      Effort: Pulmonary effort is normal.   Abdominal:      General: Abdomen is flat.      Palpations: Abdomen is soft.      Tenderness: There is no guarding or rebound.   Genitourinary:     General: Normal vulva.      Exam position: Lithotomy position.      Labia:         " Right: No rash, tenderness, lesion or injury.         Left: No rash, tenderness, lesion or injury.       Vagina: No signs of injury and foreign body. No vaginal discharge, erythema, tenderness or bleeding.      Cervix: No cervical motion tenderness, discharge or friability.      Uterus: Tender. Not enlarged.       Adnexa:         Right: Tenderness present. No mass.          Left: No mass or tenderness.     Skin:     General: Skin is warm and dry.   Neurological:      General: No focal deficit present.      Mental Status: She is alert and oriented to person, place, and time.   Psychiatric:         Mood and Affect: Mood normal.         Behavior: Behavior normal.         Thought Content: Thought content normal.         Judgment: Judgment normal.

## 2024-04-28 ENCOUNTER — HOSPITAL ENCOUNTER (OUTPATIENT)
Dept: RADIOLOGY | Facility: HOSPITAL | Age: 34
Discharge: HOME | End: 2024-04-28
Attending: NURSE PRACTITIONER
Payer: COMMERCIAL

## 2024-04-28 DIAGNOSIS — R10.2 ACUTE PELVIC PAIN, FEMALE: ICD-10-CM

## 2024-04-28 PROCEDURE — 76830 TRANSVAGINAL US NON-OB: CPT

## 2024-04-28 PROCEDURE — 76856 US EXAM PELVIC COMPLETE: CPT

## 2024-04-28 NOTE — LETTER
[facility]  [address] [place] [address]      May 1, 2024    MRN: [id number]     Phone: [phone]     Dear Ms. Christiansen,    You recently had a(n) Ultrasound performed on 4/28/2024 at  [facility] that was requested by CINDY Almanza. The study was reviewed by a radiologist, which is a physician who specializes in medical imaging. The radiologist issued a report describing his or her findings. In that report there was a finding that the radiologist felt warranted further discussion with your health care provider and that discussion would be beneficial to you.      The results were sent to CINDY Almanza on 04/28/2024  5:25 PM. We recommend that you contact CINDY Almanza at [phone] or set up an appointment to discuss the results of the imaging test. If you have already heard from CINDY Almanza regarding the results of your study, you can disregard this letter.     This letter is not meant to alarm you, but intended to encourage you to follow-up on your results with the provider that sent you for the imaging study. In addition, we have enclosed answers to frequently asked questions by other patients who have also received a letter to review results with their health care provider (see page two).      Thank you for choosing [facility] for your medical imaging needs.                                                                                                                                                        FREQUENTLY ASKED QUESTIONS    Why am I receiving this letter?  [place] State Law requires us to notify patients who have findings on imaging exams that may require more testing or follow-up with a health professional within the next 3 months.        How serious is the finding on the imaging test?  This letter is sent to all patients who may need follow-up or more testing within the  next 3 months.  Receiving this letter does not necessarily mean you have a life-threatening imaging finding or disease.  Recommendations in the radiologist’s imaging report are general in nature and it is up to your healthcare provider to say whether those recommendations make sense for your situation.  You are strongly encouraged to talk to your health care provider about the results and ask whether additional steps need to be taken.    Where can I get a copy of the final report for my recent radiology exam?  To get a full copy of the report you can access your records online at [email or web address] or please contact [facility]’s Medical Records Department at [phone] Monday through Friday between 8 am and 6 pm.         What do I need to do now?           Please contact your health care provider who requested the imaging study to discuss what further actions (if any) are needed.  You may have already heard from (your ordering provider) in regard to this test in which case you can disregard this letter.        NOTICE IN ACCORDANCE WITH THE [place] STATE “PATIENT TEST RESULT INFORMATION ACT OF 2018”    You are receiving this notice as a result of a determination by your diagnostic imaging service that further discussions of your test results are warranted and would be beneficial to you.    The complete results of your test or tests have been or will be sent to the health care practitioner that ordered the test or tests. It is recommended that you contact your health care practitioner to discuss your results as soon as possible.

## 2024-04-29 NOTE — RESULT ENCOUNTER NOTE
Pelvic US in 32 yo w/ reoccurring RLQ pain.  19 mm cyst, left.  Given she was cycle day 16 at the time of her ultrasound this is very likely a post-ovulation cyst.  She was started on a  progesterone only pill for cycle suppression with a follow up visit with Dr. Hayden.

## 2024-05-06 ENCOUNTER — OFFICE VISIT (OUTPATIENT)
Dept: FAMILY MEDICINE CLINIC | Facility: CLINIC | Age: 34
End: 2024-05-06
Payer: COMMERCIAL

## 2024-05-06 VITALS
HEIGHT: 65 IN | OXYGEN SATURATION: 99 % | WEIGHT: 154.6 LBS | SYSTOLIC BLOOD PRESSURE: 102 MMHG | DIASTOLIC BLOOD PRESSURE: 78 MMHG | BODY MASS INDEX: 25.76 KG/M2 | TEMPERATURE: 98 F | HEART RATE: 87 BPM

## 2024-05-06 DIAGNOSIS — R39.9 UTI SYMPTOMS: ICD-10-CM

## 2024-05-06 DIAGNOSIS — N39.0 URINARY TRACT INFECTION WITHOUT HEMATURIA, SITE UNSPECIFIED: Primary | ICD-10-CM

## 2024-05-06 LAB
SL AMB  POCT GLUCOSE, UA: NEGATIVE
SL AMB LEUKOCYTE ESTERASE,UA: NORMAL
SL AMB POCT BILIRUBIN,UA: NEGATIVE
SL AMB POCT BLOOD,UA: NEGATIVE
SL AMB POCT CLARITY,UA: CLEAR
SL AMB POCT COLOR,UA: YELLOW
SL AMB POCT KETONES,UA: NEGATIVE
SL AMB POCT NITRITE,UA: NEGATIVE
SL AMB POCT PH,UA: 6.5
SL AMB POCT SPECIFIC GRAVITY,UA: 1.02
SL AMB POCT URINE PROTEIN: NORMAL
SL AMB POCT UROBILINOGEN: NORMAL

## 2024-05-06 PROCEDURE — 99214 OFFICE O/P EST MOD 30 MIN: CPT

## 2024-05-06 PROCEDURE — 81003 URINALYSIS AUTO W/O SCOPE: CPT

## 2024-05-06 RX ORDER — NITROFURANTOIN 25; 75 MG/1; MG/1
100 CAPSULE ORAL 2 TIMES DAILY
Qty: 10 CAPSULE | Refills: 0 | Status: SHIPPED | OUTPATIENT
Start: 2024-05-06 | End: 2024-05-11

## 2024-05-06 NOTE — ASSESSMENT & PLAN NOTE
Patient presents with symptoms concerning for UTI today.  Urine dip today reveals positive leukocytes and positive protein in the urine.  Will treat with Macrobid for UTI, twice daily x 5 days.  Counseled on side effects.  Patient has no antibiotic allergies.  No CVA tenderness today to make me concern for other etiology of urine abnormalities.  Counseled on increasing hydration, recommended cranberry juice for dysuria.  Recommend follow-up with OB for pelvic pain as this is not likely related to the UTI completely.  If patient's symptoms persist past 5 days of antibiotics, would recommend repeat urine in office with culture.  Patient is agreeable with this plan and will follow-up if her symptoms continue.  ER precautions with severe flank pain, fevers or chills.

## 2024-05-06 NOTE — PROGRESS NOTES
Name: Laura Christiansen      : 1990      MRN: [id number]  Encounter Provider: Sharlene Stephens PA-C  Encounter Date: 2024   Encounter department: [facility]    Assessment & Plan     1. Urinary tract infection without hematuria, site unspecified  Assessment & Plan:  Patient presents with symptoms concerning for UTI today.  Urine dip today reveals positive leukocytes and positive protein in the urine.  Will treat with Macrobid for UTI, twice daily x 5 days.  Counseled on side effects.  Patient has no antibiotic allergies.  No CVA tenderness today to make me concern for other etiology of urine abnormalities.  Counseled on increasing hydration, recommended cranberry juice for dysuria.  Recommend follow-up with OB for pelvic pain as this is not likely related to the UTI completely.  If patient's symptoms persist past 5 days of antibiotics, would recommend repeat urine in office with culture.  Patient is agreeable with this plan and will follow-up if her symptoms continue.  ER precautions with severe flank pain, fevers or chills.    Orders:  -     nitrofurantoin (MACROBID) 100 mg capsule; Take 1 capsule (100 mg total) by mouth 2 (two) times a day for 5 days    2. UTI symptoms  -     POCT urine dip auto non-scope           Subjective      Patient presents today with concerns of increased urinary frequency for the past 10 days which worsened over the weekend.  She also experienced dysuria over the weekend, which is a new symptom.  She states she is producing little amounts of urine at a time and it is waking her up in the middle of the night.  She counted over the weekend and she urinated 20 times in 1 day.  No hematuria.  She does endorse pelvic pain however she is currently seeing OB for this pelvic pain as she does have an ovarian cyst.  No flank pain.  No fevers or chills.  No recent UTIs.    Urinary Tract Infection   This is a new problem. The current episode started 1 to 4 weeks ago.  The problem occurs every urination. The quality of the pain is described as burning. There has been no fever. There is No history of pyelonephritis. Associated symptoms include frequency. Pertinent negatives include no chills, flank pain, hematuria, hesitancy, nausea, urgency or vomiting. She has tried increased fluids for the symptoms. The treatment provided no relief. There is no history of kidney stones, recurrent UTIs or urinary stasis.     Review of Systems   Constitutional:  Negative for chills, fatigue and fever.   Gastrointestinal:  Positive for abdominal pain. Negative for constipation, diarrhea, nausea and vomiting.   Genitourinary:  Positive for dysuria, frequency and pelvic pain. Negative for decreased urine volume, flank pain, hematuria, hesitancy, urgency and vaginal bleeding.       Current Outpatient Medications on File Prior to Visit   Medication Sig    albuterol (PROVENTIL HFA,VENTOLIN HFA) 90 mcg/act inhaler Inhale 2 puffs every 4 (four) hours as needed for wheezing    Cholecalciferol (Vitamin D-3) 125 MCG (5000 UT) TABS Take by mouth    citalopram (CeleXA) 40 mg tablet     folic acid (FOLVITE) 1 mg tablet Take 1 tablet (1 mg total) by mouth daily    hydroxychloroquine (PLAQUENIL) 200 mg tablet Take 1.5 tablets (300 mg total) by mouth in the morning    methotrexate 2.5 MG tablet 5 tablet po weekly (take all 5 tablets on the same day every week)    naproxen (Naprosyn) 500 mg tablet Take 1 tablet (500 mg total) by mouth 2 (two) times a day with meals As needed for joint pain    norethindrone (Ortho Micronor) 0.35 MG tablet Take 1 tablet (0.35 mg total) by mouth daily    Spiriva Respimat 1.25 MCG/ACT AERS inhaler INHALE TWO PUFFS BY MOUTH DAILY    Symbicort 160-4.5 MCG/ACT inhaler INHALE TWO PUFFS BY MOUTH TWICE DAILY. RINSE MOUTH AFTER USE.    ARIPiprazole (ABILIFY) 5 mg tablet Take 1 tablet (5 mg total) by mouth daily at bedtime (Patient not taking: Reported on 5/6/2024)    Butalbital-APAP-Caffeine  "(Fioricet) -40 MG CAPS Take 1 capsule by mouth 4 (four) times a day as needed (headache) (Patient not taking: Reported on 9/15/2023)    EPINEPHrine (EPIPEN) 0.3 mg/0.3 mL SOAJ Inject 0.3 mL (0.3 mg total) into a muscle once for 1 dose    hydrOXYzine HCL (ATARAX) 25 mg tablet 1-3 a day as needed (Patient not taking: Reported on 1/29/2024)    ketoconazole (NIZORAL) 2 % cream Apply topically daily To neck (Patient not taking: Reported on 9/15/2023)    ketorolac (TORADOL) 10 mg tablet Take 1 tablet (10 mg total) by mouth every 6 (six) hours as needed for moderate pain (Patient not taking: Reported on 8/14/2023)    methylPREDNISolone 4 MG tablet therapy pack Use as directed on package (Patient not taking: Reported on 5/6/2024)    montelukast (SINGULAIR) 10 mg tablet TAKE 1 TABLET BY MOUTH DAILY AT BEDTIME (Patient not taking: Reported on 9/15/2023)    Multiple Vitamin (MULTIVITAMIN) tablet Take 1 tablet by mouth daily. (Patient not taking: Reported on 5/6/2024)    predniSONE 5 mg tablet 4 tabs x7 days, then 3 tabs x7 days, then 2 tabs x7 days, then 1 tab x7 days, then stop. (Patient not taking: Reported on 3/19/2024)       Objective     /78 (BP Location: Left arm, Patient Position: Sitting, Cuff Size: Adult)   Pulse 87   Temp 98 °F (36.7 °C)   Ht 5' 4.5\" (1.638 m)   Wt 70.1 kg (154 lb 9.6 oz)   LMP 04/13/2024 (Exact Date)   SpO2 99%   BMI 26.13 kg/m²     Physical Exam  Vitals and nursing note reviewed.   Constitutional:       General: She is not in acute distress.     Appearance: Normal appearance. She is not ill-appearing or diaphoretic.   HENT:      Head: Normocephalic and atraumatic.   Cardiovascular:      Rate and Rhythm: Normal rate and regular rhythm.      Heart sounds: No murmur heard.  Pulmonary:      Effort: Pulmonary effort is normal. No respiratory distress.      Breath sounds: Normal breath sounds.   Abdominal:      General: Bowel sounds are normal. There is no distension.      Palpations: " Abdomen is soft.      Tenderness: There is abdominal tenderness in the suprapubic area. There is no right CVA tenderness, left CVA tenderness, guarding or rebound.   Musculoskeletal:      Right lower leg: No edema.      Left lower leg: No edema.   Skin:     General: Skin is warm and dry.      Coloration: Skin is not cyanotic or pale.   Neurological:      General: No focal deficit present.      Mental Status: She is alert and oriented to person, place, and time. Mental status is at baseline.   Psychiatric:         Mood and Affect: Mood normal.         Behavior: Behavior normal. Behavior is cooperative.         Judgment: Judgment normal.       Sharlene Stephens PA-C

## 2024-05-09 ENCOUNTER — OFFICE VISIT (OUTPATIENT)
Dept: OBGYN CLINIC | Facility: CLINIC | Age: 34
End: 2024-05-09
Payer: COMMERCIAL

## 2024-05-09 VITALS
BODY MASS INDEX: 25.62 KG/M2 | HEIGHT: 65 IN | HEART RATE: 86 BPM | WEIGHT: 153.8 LBS | SYSTOLIC BLOOD PRESSURE: 108 MMHG | OXYGEN SATURATION: 99 % | DIASTOLIC BLOOD PRESSURE: 70 MMHG

## 2024-05-09 DIAGNOSIS — N94.6 DYSMENORRHEA: Primary | ICD-10-CM

## 2024-05-09 DIAGNOSIS — R79.89 ELEVATED PROLACTIN LEVEL: ICD-10-CM

## 2024-05-09 PROBLEM — J01.90 ACUTE SINUSITIS: Status: RESOLVED | Noted: 2024-04-09 | Resolved: 2024-05-09

## 2024-05-09 PROCEDURE — 99214 OFFICE O/P EST MOD 30 MIN: CPT | Performed by: OBSTETRICS & GYNECOLOGY

## 2024-05-09 RX ORDER — KETOROLAC TROMETHAMINE 10 MG/1
10 TABLET, FILM COATED ORAL EVERY 6 HOURS PRN
Qty: 20 TABLET | Refills: 0 | Status: SHIPPED | OUTPATIENT
Start: 2024-05-09 | End: 2024-05-14

## 2024-05-09 NOTE — PROGRESS NOTES
"Subjective:     Laura Christiansen is a 33 y.o.  female who presents for follow up regarding menorrhagia and dysmenorrhea. She was seen in the office approximately 2 weeks ago and started on progesterone only pills. She states her pain is still bad on the pills but her bleeding has improved. Her ultrasound only showed a physiologic cyst. She had a prior slightly elevated prolactin level.    Objective:    Vitals: Blood pressure 108/70, pulse 86, height 5' 5\" (1.651 m), weight 69.8 kg (153 lb 12.8 oz), last menstrual period 2024, SpO2 99%.Body mass index is 25.59 kg/m².    Physical Exam  Constitutional:       Appearance: She is well-developed.   Cardiovascular:      Rate and Rhythm: Normal rate and regular rhythm.      Heart sounds: Normal heart sounds. No murmur heard.     No friction rub. No gallop.   Pulmonary:      Effort: Pulmonary effort is normal. No respiratory distress.      Breath sounds: No wheezing.   Abdominal:      Palpations: Abdomen is soft.      Tenderness: There is no abdominal tenderness.   Musculoskeletal:         General: No tenderness.   Neurological:      Mental Status: She is alert and oriented to person, place, and time.   Vitals reviewed.         Assessment/Plan:    A/P:  Pt is a 33 y.o.  with menorrhagia and dysmenorrhea      Diagnoses and all orders for this visit:    Dysmenorrhea  -     ketorolac (TORADOL) 10 mg tablet; Take 1 tablet (10 mg total) by mouth every 6 (six) hours as needed for moderate pain for up to 5 days    Elevated prolactin level  -     Prolactin; Future              Shaylee Hayden MD  2024  12:32 PM        "

## 2024-05-21 DIAGNOSIS — N94.6 DYSMENORRHEA: ICD-10-CM

## 2024-05-21 RX ORDER — KETOROLAC TROMETHAMINE 10 MG/1
10 TABLET, FILM COATED ORAL EVERY 6 HOURS PRN
Qty: 20 TABLET | Refills: 2 | Status: SHIPPED | OUTPATIENT
Start: 2024-05-21 | End: 2024-05-29

## 2024-05-29 ENCOUNTER — OFFICE VISIT (OUTPATIENT)
Dept: GASTROENTEROLOGY | Facility: MEDICAL CENTER | Age: 34
End: 2024-05-29
Payer: COMMERCIAL

## 2024-05-29 ENCOUNTER — OFFICE VISIT (OUTPATIENT)
Dept: FAMILY MEDICINE CLINIC | Facility: CLINIC | Age: 34
End: 2024-05-29
Payer: COMMERCIAL

## 2024-05-29 ENCOUNTER — TELEPHONE (OUTPATIENT)
Dept: GASTROENTEROLOGY | Facility: MEDICAL CENTER | Age: 34
End: 2024-05-29

## 2024-05-29 ENCOUNTER — APPOINTMENT (OUTPATIENT)
Dept: LAB | Facility: MEDICAL CENTER | Age: 34
End: 2024-05-29
Payer: COMMERCIAL

## 2024-05-29 VITALS
BODY MASS INDEX: 25.13 KG/M2 | DIASTOLIC BLOOD PRESSURE: 73 MMHG | TEMPERATURE: 98.6 F | HEART RATE: 100 BPM | SYSTOLIC BLOOD PRESSURE: 107 MMHG | WEIGHT: 151 LBS

## 2024-05-29 VITALS
WEIGHT: 153.4 LBS | SYSTOLIC BLOOD PRESSURE: 92 MMHG | DIASTOLIC BLOOD PRESSURE: 70 MMHG | BODY MASS INDEX: 25.56 KG/M2 | HEART RATE: 106 BPM | OXYGEN SATURATION: 97 % | TEMPERATURE: 97.7 F | HEIGHT: 65 IN

## 2024-05-29 DIAGNOSIS — K92.1 BLOOD IN STOOL: ICD-10-CM

## 2024-05-29 DIAGNOSIS — E55.9 VITAMIN D DEFICIENCY: ICD-10-CM

## 2024-05-29 DIAGNOSIS — N92.1 MENORRHAGIA WITH IRREGULAR CYCLE: Primary | ICD-10-CM

## 2024-05-29 DIAGNOSIS — R63.0 LACK OF APPETITE: ICD-10-CM

## 2024-05-29 DIAGNOSIS — R31.9 URINARY TRACT INFECTION WITH HEMATURIA, SITE UNSPECIFIED: ICD-10-CM

## 2024-05-29 DIAGNOSIS — R53.83 FATIGUE, UNSPECIFIED TYPE: ICD-10-CM

## 2024-05-29 DIAGNOSIS — N39.0 URINARY TRACT INFECTION WITH HEMATURIA, SITE UNSPECIFIED: ICD-10-CM

## 2024-05-29 DIAGNOSIS — R19.7 DIARRHEA, UNSPECIFIED TYPE: ICD-10-CM

## 2024-05-29 DIAGNOSIS — R14.0 ABDOMINAL BLOATING: ICD-10-CM

## 2024-05-29 DIAGNOSIS — R19.7 DIARRHEA, UNSPECIFIED TYPE: Primary | ICD-10-CM

## 2024-05-29 LAB
ALBUMIN SERPL BCP-MCNC: 4.5 G/DL (ref 3.5–5)
ALP SERPL-CCNC: 34 U/L (ref 34–104)
ALT SERPL W P-5'-P-CCNC: 17 U/L (ref 7–52)
ANION GAP SERPL CALCULATED.3IONS-SCNC: 7 MMOL/L (ref 4–13)
AST SERPL W P-5'-P-CCNC: 17 U/L (ref 13–39)
BASOPHILS # BLD AUTO: 0.04 THOUSANDS/ÂΜL (ref 0–0.1)
BASOPHILS NFR BLD AUTO: 1 % (ref 0–1)
BILIRUB SERPL-MCNC: 0.42 MG/DL (ref 0.2–1)
BUN SERPL-MCNC: 18 MG/DL (ref 5–25)
CALCIUM SERPL-MCNC: 9.2 MG/DL (ref 8.4–10.2)
CHLORIDE SERPL-SCNC: 105 MMOL/L (ref 96–108)
CO2 SERPL-SCNC: 28 MMOL/L (ref 21–32)
CREAT SERPL-MCNC: 0.69 MG/DL (ref 0.6–1.3)
CRP SERPL QL: <1 MG/L
EOSINOPHIL # BLD AUTO: 0.16 THOUSAND/ÂΜL (ref 0–0.61)
EOSINOPHIL NFR BLD AUTO: 4 % (ref 0–6)
ERYTHROCYTE [DISTWIDTH] IN BLOOD BY AUTOMATED COUNT: 12.6 % (ref 11.6–15.1)
FERRITIN SERPL-MCNC: 25 NG/ML (ref 11–307)
GFR SERPL CREATININE-BSD FRML MDRD: 114 ML/MIN/1.73SQ M
GLUCOSE P FAST SERPL-MCNC: 95 MG/DL (ref 65–99)
HCT VFR BLD AUTO: 45.9 % (ref 34.8–46.1)
HGB BLD-MCNC: 15.3 G/DL (ref 11.5–15.4)
IGA SERPL-MCNC: 89 MG/DL (ref 66–433)
IMM GRANULOCYTES # BLD AUTO: 0.01 THOUSAND/UL (ref 0–0.2)
IMM GRANULOCYTES NFR BLD AUTO: 0 % (ref 0–2)
IRON SATN MFR SERPL: 52 % (ref 15–50)
IRON SERPL-MCNC: 175 UG/DL (ref 50–212)
LYMPHOCYTES # BLD AUTO: 1.2 THOUSANDS/ÂΜL (ref 0.6–4.47)
LYMPHOCYTES NFR BLD AUTO: 30 % (ref 14–44)
MCH RBC QN AUTO: 31.7 PG (ref 26.8–34.3)
MCHC RBC AUTO-ENTMCNC: 33.3 G/DL (ref 31.4–37.4)
MCV RBC AUTO: 95 FL (ref 82–98)
MONOCYTES # BLD AUTO: 0.4 THOUSAND/ÂΜL (ref 0.17–1.22)
MONOCYTES NFR BLD AUTO: 10 % (ref 4–12)
NEUTROPHILS # BLD AUTO: 2.19 THOUSANDS/ÂΜL (ref 1.85–7.62)
NEUTS SEG NFR BLD AUTO: 55 % (ref 43–75)
NRBC BLD AUTO-RTO: 0 /100 WBCS
PLATELET # BLD AUTO: 224 THOUSANDS/UL (ref 149–390)
PMV BLD AUTO: 8.9 FL (ref 8.9–12.7)
POTASSIUM SERPL-SCNC: 5 MMOL/L (ref 3.5–5.3)
PROT SERPL-MCNC: 6.8 G/DL (ref 6.4–8.4)
RBC # BLD AUTO: 4.82 MILLION/UL (ref 3.81–5.12)
SL AMB  POCT GLUCOSE, UA: NEGATIVE
SL AMB LEUKOCYTE ESTERASE,UA: NEGATIVE
SL AMB POCT BILIRUBIN,UA: NEGATIVE
SL AMB POCT BLOOD,UA: NORMAL
SL AMB POCT CLARITY,UA: CLEAR
SL AMB POCT COLOR,UA: YELLOW
SL AMB POCT KETONES,UA: NEGATIVE
SL AMB POCT NITRITE,UA: NEGATIVE
SL AMB POCT PH,UA: 6
SL AMB POCT SPECIFIC GRAVITY,UA: 1.02
SL AMB POCT URINE PROTEIN: NEGATIVE
SL AMB POCT UROBILINOGEN: NORMAL
SODIUM SERPL-SCNC: 140 MMOL/L (ref 135–147)
TIBC SERPL-MCNC: 336 UG/DL (ref 250–450)
UIBC SERPL-MCNC: 161 UG/DL (ref 155–355)
WBC # BLD AUTO: 4 THOUSAND/UL (ref 4.31–10.16)

## 2024-05-29 PROCEDURE — 81003 URINALYSIS AUTO W/O SCOPE: CPT

## 2024-05-29 PROCEDURE — 80053 COMPREHEN METABOLIC PANEL: CPT

## 2024-05-29 PROCEDURE — 99244 OFF/OP CNSLTJ NEW/EST MOD 40: CPT | Performed by: PHYSICIAN ASSISTANT

## 2024-05-29 PROCEDURE — 86364 TISS TRNSGLTMNASE EA IG CLAS: CPT

## 2024-05-29 PROCEDURE — 83550 IRON BINDING TEST: CPT

## 2024-05-29 PROCEDURE — 85025 COMPLETE CBC W/AUTO DIFF WBC: CPT

## 2024-05-29 PROCEDURE — 82728 ASSAY OF FERRITIN: CPT

## 2024-05-29 PROCEDURE — 99214 OFFICE O/P EST MOD 30 MIN: CPT

## 2024-05-29 PROCEDURE — 83540 ASSAY OF IRON: CPT

## 2024-05-29 PROCEDURE — 86140 C-REACTIVE PROTEIN: CPT

## 2024-05-29 PROCEDURE — 82784 ASSAY IGA/IGD/IGG/IGM EACH: CPT

## 2024-05-29 PROCEDURE — 36415 COLL VENOUS BLD VENIPUNCTURE: CPT

## 2024-05-29 NOTE — H&P (VIEW-ONLY)
[facility] - Outpatient Consultation  Laura Christiansen 33 y.o. female MRN: [id number]  Encounter: [id number]      Assessment and Plan    1. Abdominal bloating  2. Change in bowel habits  3. Hematochezia  4. Change in appetite   Starting mid April the patient noticed that she was having a change in bowel habits and hematochezia.  She previously was having a bowel movement every other day and now she is having frequent bowel movements anywhere from once a day to 3 times a day that are formed to watery.  She states that she is seeing blood in every bowel movement and is feeling tired and weak.  This past weekend she also noticed a change in her appetite where she is no longer hungry.  She denies any other associated GI symptoms or complaints although she does have chronic abdominal bloating for which she is on a gluten-free diet.  She states that if she eats gluten she gets a fever, diarrhea, and abdominal bloating.  She had previous celiac disease antibody profile 2018 which was negative.  She also had a colonoscopy at that time for constipation which was normal.  She denies any family history of celiac disease or inflammatory bowel disease.  -Obtain infectious stool studies, inflammatory markers, H. pylori stool antigen, CBC, CMP, and although she is on a gluten-free diet repeat celiac serologies to ensure these are normal  -Also recommend both EGD and colonoscopy   -I obtained informed consent from the patient. The risks/benefits/alternatives of the procedure were discussed with the patient. Risks included, but not limited to, infection, bleeding, perforation, injury to organs in the abdomen, missed lesion and incomplete procedure were discussed. Patient was agreeable and electronic signature was obtained.    Follow-up after EGD and colonoscopy    ______________________________________________________________________    History of Present Illness  Laura Christiansen is a 33 y.o. female here for  consultation of hematochezia associated with a change in bowel habits.  The patient states that she typically has a bowel movement every other day however she is now having frequent bowel movements that range from formed to watery.  In mid April she has noticed a couple of episodes of hematochezia and now the blood is mixed into her stool with every bowel movement.  She is feeling very fatigued and tired.  She is also noticing that as of this past weekend she does not have an appetite.  She denies any nausea, vomiting, GERD, unintentional weight loss, or other GI symptoms or complaints aside for chronic abdominal bloating for which she is on a gluten-free diet 4.  She states that if she eats gluten she will get bloating, diarrhea, and fever.  She had negative celiac antibody panel back in 2018.  At that time she also had a colonoscopy secondary to constipation which was normal to the TI.      Review of Systems   Constitutional:  Positive for appetite change. Negative for activity change, chills, fatigue, fever and unexpected weight change.   Gastrointestinal:  Positive for abdominal pain, blood in stool and diarrhea. Negative for abdominal distention, anal bleeding, constipation, nausea, rectal pain and vomiting.   Genitourinary:  Negative for dysuria, frequency and hematuria.   Musculoskeletal:  Positive for arthralgias. Negative for myalgias.   Neurological:  Negative for headaches.   Psychiatric/Behavioral:  Negative for behavioral problems and confusion.        Past Medical History  Past Medical History:   Diagnosis Date    Anxiety     Asthma December 2022    Depression     Lupus (HCC) 2024    Miscarriage 2011    Pilonidal cyst with abscess     Seasonal allergies     Varicella     Wears glasses        Past Social history  Past Surgical History:   Procedure Laterality Date    CARDIAC CATHETERIZATION  8/29/2022    Procedure: Cardiac catheterization;  Surgeon: Jethro Hood MD;  Location: [facility];   Service: Cardiology    CARDIAC CATHETERIZATION N/A 8/29/2022    Procedure: Cardiac Coronary Angiogram;  Surgeon: Jethro Hood MD;  Location: [facility];  Service: Cardiology    CHOLECYSTECTOMY  08/2010    Laparoscopic    COLONOSCOPY N/A 5/9/2018    Procedure: COLONOSCOPY;  Surgeon: Zaid Dunlap MD;  Location: [facility];  Service: Gastroenterology    MD EXCISION PILONIDAL CYST/SINUS SIMPLE N/A 4/28/2016    Procedure: EXCISION PILONIDAL CYST;  Surgeon: Neena Levine MD;  Location: [facility];  Service: General    MD LAPAROSCOPY W/RMVL ADNEXAL STRUCTURES Bilateral 10/27/2016    Procedure: SALPINGECTOMY;  Surgeon: Elly Brothers MD;  Location: [facility];  Service: Gynecology    SALPINGECTOMY      WISDOM TOOTH EXTRACTION  01/2016    right upper only     Social History     Socioeconomic History    Marital status: /Civil Union     Spouse name: Not on file    Number of children: 2    Years of education: Not on file    Highest education level: Not on file   Occupational History    Occupation: homemaker   Tobacco Use    Smoking status: Never    Smokeless tobacco: Never   Vaping Use    Vaping status: Never Used   Substance and Sexual Activity    Alcohol use: Never    Drug use: Never    Sexual activity: Yes     Partners: Male     Birth control/protection: Female Sterilization   Other Topics Concern    Not on file   Social History Narrative        [employer] affiliation     Social Determinants of Health     Financial Resource Strain: Low Risk  (4/2/2024)    [facility]    Overall Financial Resource Strain (CARDIA)     Difficulty of Paying Living Expenses: Not hard at all   Food Insecurity: No Food Insecurity (4/2/2024)    [facility], [facility]    Hunger Vital Sign     Worried About Running Out of Food in the Last Year: Never true     Ran Out of Food in the Last Year: Never true    Transportation Needs: No Transportation Needs (4/2/2024)    [facility]    PRAPARE - Transportation     Lack of Transportation (Medical): No     Lack of Transportation (Non-Medical): No   Physical Activity: Not on file   Stress: Not on file   Social Connections: Not on file   Intimate Partner Violence: Not At Risk (4/2/2024)    [facility], [facility]    Humiliation, Afraid, Rape, and Kick questionnaire     Fear of Current or Ex-Partner: No     Emotionally Abused: No     Physically Abused: No     Sexually Abused: No   Housing Stability: Low Risk  (4/2/2024)    [facility], [facility]    Housing Stability Vital Sign     Unable to Pay for Housing in the Last Year: No     Number of Places Lived in the Last Year: 1     Unstable Housing in the Last Year: No     Social History     Substance and Sexual Activity   Alcohol Use Never     Social History     Substance and Sexual Activity   Drug Use Never     Social History     Tobacco Use   Smoking Status Never   Smokeless Tobacco Never       Past Family History  Family History   Problem Relation Age of Onset    Seizures Mother     Other Mother         Epilepsy    Mental illness Mother     Dementia Mother     Schizophrenia Mother     Suicide Attempts Mother     Dysrhythmia Father     Hypertension Father     Stroke Father     Other Father         Cardiac disorder, cardiac pacemaker     Heart disease Father     Coronary artery disease Paternal Grandfather     Cancer Family     Heart disease Brother        Current Medications  Current Outpatient Medications   Medication Sig Dispense Refill    albuterol (PROVENTIL HFA,VENTOLIN HFA) 90 mcg/act inhaler Inhale 2 puffs every 4 (four) hours as needed for wheezing 18 g 4    ARIPiprazole (ABILIFY) 5 mg tablet Take 1 tablet (5 mg total) by mouth daily at bedtime (Patient not  taking: Reported on 5/6/2024) 30 tablet 1    Butalbital-APAP-Caffeine (Fioricet) -40 MG CAPS Take 1 capsule by mouth 4 (four) times a day as needed (headache) (Patient not taking: Reported on 9/15/2023) 20 capsule 0    Cholecalciferol (Vitamin D-3) 125 MCG (5000 UT) TABS Take by mouth      citalopram (CeleXA) 40 mg tablet       EPINEPHrine (EPIPEN) 0.3 mg/0.3 mL SOAJ Inject 0.3 mL (0.3 mg total) into a muscle once for 1 dose 0.6 mL 0    folic acid (FOLVITE) 1 mg tablet Take 1 tablet (1 mg total) by mouth daily 90 tablet 1    hydroxychloroquine (PLAQUENIL) 200 mg tablet Take 1.5 tablets (300 mg total) by mouth in the morning 135 tablet 1    hydrOXYzine HCL (ATARAX) 25 mg tablet 1-3 a day as needed (Patient not taking: Reported on 1/29/2024)      ketoconazole (NIZORAL) 2 % cream Apply topically daily To neck (Patient not taking: Reported on 9/15/2023) 30 g 0    ketorolac (TORADOL) 10 mg tablet Take 1 tablet (10 mg total) by mouth every 6 (six) hours as needed for moderate pain for up to 5 days 20 tablet 2    methotrexate 2.5 MG tablet 5 tablet po weekly (take all 5 tablets on the same day every week) 75 tablet 1    methylPREDNISolone 4 MG tablet therapy pack Use as directed on package (Patient not taking: Reported on 5/6/2024) 21 each 0    montelukast (SINGULAIR) 10 mg tablet TAKE 1 TABLET BY MOUTH DAILY AT BEDTIME (Patient not taking: Reported on 9/15/2023) 30 tablet 0    Multiple Vitamin (MULTIVITAMIN) tablet Take 1 tablet by mouth daily      naproxen (Naprosyn) 500 mg tablet Take 1 tablet (500 mg total) by mouth 2 (two) times a day with meals As needed for joint pain 180 tablet 1    norethindrone (Ortho Micronor) 0.35 MG tablet Take 1 tablet (0.35 mg total) by mouth daily 84 tablet 1    predniSONE 5 mg tablet 4 tabs x7 days, then 3 tabs x7 days, then 2 tabs x7 days, then 1 tab x7 days, then stop. (Patient not taking: Reported on 3/19/2024) 70 tablet 0    Spiriva Respimat 1.25 MCG/ACT AERS inhaler INHALE TWO  PUFFS BY MOUTH DAILY 4 g 0    Symbicort 160-4.5 MCG/ACT inhaler INHALE TWO PUFFS BY MOUTH TWICE DAILY. RINSE MOUTH AFTER USE. 10.2 g 11     No current facility-administered medications for this visit.       Allergies  Allergies   Allergen Reactions    Gluten Meal - Food Allergy          The following portions of the patient's history were reviewed and updated as appropriate: allergies, current medications, past medical history, past social history, past surgical history and problem list.      Vitals  Vitals:    05/29/24 0847   BP: 107/73   Pulse: 100   Temp: 98.6 °F (37 °C)   Weight: 68.5 kg (151 lb)         Physical Exam  Constitutional   General appearance: Patient is seated and in no acute distress, well appearing and well nourished.   Head and Face   Head and face: Normal.    Eyes   Conjunctiva and lids: No erythema, swelling or discharge.  Anicteric.  Ears, Nose, Mouth, and Throat   Hearing: Normal.    Neck: Supple, trachea midline.  Pulmonary   Respiratory effort: No increased work of breathing or signs of respiratory distress.    Cardiovascular   Examination of extremities for edema and/or varicosities: Normal.    Musculoskeletal   Gait and station: Normal   Skin   Skin and subcutaneous tissue: Warm, dry, and intact. No visible jaundice, lesions or rashes.  Psychiatric   Judgment and insight: Normal  Recent and remote memory:  Normal  Mood and affect: Normal      Results  No visits with results within 1 Day(s) from this visit.   Latest known visit with results is:   Office Visit on 05/06/2024   Component Date Value     COLOR,UA 05/06/2024 yellow     CLARITY,UA 05/06/2024 clear     SPECIFIC GRAVITY,UA 05/06/2024 1.025      PH,UA 05/06/2024 6.5     LEUKOCYTE ESTERASE,UA 05/06/2024 trace     NITRITE,UA 05/06/2024 negative     GLUCOSE, UA 05/06/2024 negative     KETONES,UA 05/06/2024 negative     BILIRUBIN,UA 05/06/2024 negative     BLOOD,UA 05/06/2024 negative     POCT URINE PROTEIN 05/06/2024 trace     SL AMB  POCT UROBILINOGEN 05/06/2024 0.2E.U        Radiology Results  No results found.    Orders  No orders of the defined types were placed in this encounter.      Answers submitted by the patient for this visit:  Abdominal Pain Questionnaire (Submitted on 5/22/2024)  Chief Complaint: Abdominal pain  Chronicity: recurrent  Onset: more than 1 month ago  Onset quality: gradual  Frequency: intermittently  Episode duration: 6 Hours  Progression since onset: gradually improving  Pain location: suprapubic region, right flank  Pain - numeric: 2/10  Pain quality: cramping  Radiates to: does not radiate  anorexia: No  belching: No  flatus: No  hematochezia: Yes  melena: No  weight loss: No  Aggravated by: nothing  Relieved by: nothing  Diagnostic workup: ultrasound

## 2024-05-29 NOTE — ASSESSMENT & PLAN NOTE
Patient with fatigue since last Thursday, when her second menstrual cycle of the week started. She has had persistent and worsening fatigue since then. Her GI specialist has ordered several labs for her to complete, which are still in progress. Patient's Hgb and RBC are normal according to CBC results. Iron panel still in process. Patient is also having rectal bleeding, GI has sent her for stool samples and cannot get her in for the colonoscopy until August.     Discussed with patient that I am primarily worried about her iron level at this time, and I do not have any other suggestions to include in her lab work, I agree with GI's work up.     Patient did recently start progesterone only OCP, which may be related to her abnormal extra menstrual cycle this month, if her cycle lasts for longer than 10 days, I would recommend sooner follow up with OBGYN. Patient is agreeable with this recommendation.     Recommended vitamin D and B12 levels if fatigue does not improve after blood work up is complete.     Patient and I discussed warning signs that she should go to the ER for, including lightheadedness, dizziness, presyncopal symptoms, and shortness of breath. Discussed with her she may need IV fluids or iron infusion if these symptoms occur.

## 2024-05-29 NOTE — PROGRESS NOTES
[facility] - Outpatient Consultation  Laura Christiansen 33 y.o. female MRN: [id number]  Encounter: [id number]      Assessment and Plan    1. Abdominal bloating  2. Change in bowel habits  3. Hematochezia  4. Change in appetite   Starting mid April the patient noticed that she was having a change in bowel habits and hematochezia.  She previously was having a bowel movement every other day and now she is having frequent bowel movements anywhere from once a day to 3 times a day that are formed to watery.  She states that she is seeing blood in every bowel movement and is feeling tired and weak.  This past weekend she also noticed a change in her appetite where she is no longer hungry.  She denies any other associated GI symptoms or complaints although she does have chronic abdominal bloating for which she is on a gluten-free diet.  She states that if she eats gluten she gets a fever, diarrhea, and abdominal bloating.  She had previous celiac disease antibody profile 2018 which was negative.  She also had a colonoscopy at that time for constipation which was normal.  She denies any family history of celiac disease or inflammatory bowel disease.  -Obtain infectious stool studies, inflammatory markers, H. pylori stool antigen, CBC, CMP, and although she is on a gluten-free diet repeat celiac serologies to ensure these are normal  -Also recommend both EGD and colonoscopy   -I obtained informed consent from the patient. The risks/benefits/alternatives of the procedure were discussed with the patient. Risks included, but not limited to, infection, bleeding, perforation, injury to organs in the abdomen, missed lesion and incomplete procedure were discussed. Patient was agreeable and electronic signature was obtained.    Follow-up after EGD and colonoscopy    ______________________________________________________________________    History of Present Illness  Laura Christiansen is a 33 y.o. female here for  consultation of hematochezia associated with a change in bowel habits.  The patient states that she typically has a bowel movement every other day however she is now having frequent bowel movements that range from formed to watery.  In mid April she has noticed a couple of episodes of hematochezia and now the blood is mixed into her stool with every bowel movement.  She is feeling very fatigued and tired.  She is also noticing that as of this past weekend she does not have an appetite.  She denies any nausea, vomiting, GERD, unintentional weight loss, or other GI symptoms or complaints aside for chronic abdominal bloating for which she is on a gluten-free diet 4.  She states that if she eats gluten she will get bloating, diarrhea, and fever.  She had negative celiac antibody panel back in 2018.  At that time she also had a colonoscopy secondary to constipation which was normal to the TI.      Review of Systems   Constitutional:  Positive for appetite change. Negative for activity change, chills, fatigue, fever and unexpected weight change.   Gastrointestinal:  Positive for abdominal pain, blood in stool and diarrhea. Negative for abdominal distention, anal bleeding, constipation, nausea, rectal pain and vomiting.   Genitourinary:  Negative for dysuria, frequency and hematuria.   Musculoskeletal:  Positive for arthralgias. Negative for myalgias.   Neurological:  Negative for headaches.   Psychiatric/Behavioral:  Negative for behavioral problems and confusion.        Past Medical History  Past Medical History:   Diagnosis Date    Anxiety     Asthma December 2022    Depression     Lupus (HCC) 2024    Miscarriage 2011    Pilonidal cyst with abscess     Seasonal allergies     Varicella     Wears glasses        Past Social history  Past Surgical History:   Procedure Laterality Date    CARDIAC CATHETERIZATION  8/29/2022    Procedure: Cardiac catheterization;  Surgeon: Jethro Hood MD;  Location: [facility];   Service: Cardiology    CARDIAC CATHETERIZATION N/A 8/29/2022    Procedure: Cardiac Coronary Angiogram;  Surgeon: Jethro Hood MD;  Location: [facility];  Service: Cardiology    CHOLECYSTECTOMY  08/2010    Laparoscopic    COLONOSCOPY N/A 5/9/2018    Procedure: COLONOSCOPY;  Surgeon: Zaid Dunlap MD;  Location: [facility];  Service: Gastroenterology    AZ EXCISION PILONIDAL CYST/SINUS SIMPLE N/A 4/28/2016    Procedure: EXCISION PILONIDAL CYST;  Surgeon: Neena Levine MD;  Location: [facility];  Service: General    AZ LAPAROSCOPY W/RMVL ADNEXAL STRUCTURES Bilateral 10/27/2016    Procedure: SALPINGECTOMY;  Surgeon: Elly Brothers MD;  Location: [facility];  Service: Gynecology    SALPINGECTOMY      WISDOM TOOTH EXTRACTION  01/2016    right upper only     Social History     Socioeconomic History    Marital status: /Civil Union     Spouse name: Not on file    Number of children: 2    Years of education: Not on file    Highest education level: Not on file   Occupational History    Occupation: homemaker   Tobacco Use    Smoking status: Never    Smokeless tobacco: Never   Vaping Use    Vaping status: Never Used   Substance and Sexual Activity    Alcohol use: Never    Drug use: Never    Sexual activity: Yes     Partners: Male     Birth control/protection: Female Sterilization   Other Topics Concern    Not on file   Social History Narrative        Gnosticist affiliation     Social Determinants of Health     Financial Resource Strain: Low Risk  (4/2/2024)    [facility]    Overall Financial Resource Strain (CARDIA)     Difficulty of Paying Living Expenses: Not hard at all   Food Insecurity: No Food Insecurity (4/2/2024)    [facility]    Hunger Vital Sign     Worried About Running Out of Food in the Last Year: Never true     Ran Out of Food in the Last Year: Never true    Transportation Needs: No Transportation Needs (4/2/2024)    Received from ,     PRAPARE - Transportation     Lack of Transportation (Medical): No     Lack of Transportation (Non-Medical): No   Physical Activity: Not on file   Stress: Not on file   Social Connections: Not on file   Intimate Partner Violence: Not At Risk (4/2/2024)    Received from ,     Humiliation, Afraid, Rape, and Kick questionnaire     Fear of Current or Ex-Partner: No     Emotionally Abused: No     Physically Abused: No     Sexually Abused: No   Housing Stability: Low Risk  (4/2/2024)    Received from ,     Housing Stability Vital Sign     Unable to Pay for Housing in the Last Year: No     Number of Places Lived in the Last Year: 1     Unstable Housing in the Last Year: No     Social History     Substance and Sexual Activity   Alcohol Use Never     Social History     Substance and Sexual Activity   Drug Use Never     Social History     Tobacco Use   Smoking Status Never   Smokeless Tobacco Never       Past Family History  Family History   Problem Relation Age of Onset    Seizures Mother     Other Mother         Epilepsy    Mental illness Mother     Dementia Mother     Schizophrenia Mother     Suicide Attempts Mother     Dysrhythmia Father     Hypertension Father     Stroke Father     Other Father         Cardiac disorder, cardiac pacemaker     Heart disease Father     Coronary artery disease Paternal Grandfather     Cancer Family     Heart disease Brother        Current Medications  Current Outpatient Medications   Medication Sig Dispense Refill    albuterol (PROVENTIL HFA,VENTOLIN HFA) 90 mcg/act inhaler Inhale 2 puffs every 4 (four) hours as needed for wheezing 18 g 4    ARIPiprazole (ABILIFY) 5 mg tablet Take 1 tablet (5 mg total) by mouth daily at bedtime (Patient not  taking: Reported on 5/6/2024) 30 tablet 1    Butalbital-APAP-Caffeine (Fioricet) -40 MG CAPS Take 1 capsule by mouth 4 (four) times a day as needed (headache) (Patient not taking: Reported on 9/15/2023) 20 capsule 0    Cholecalciferol (Vitamin D-3) 125 MCG (5000 UT) TABS Take by mouth      citalopram (CeleXA) 40 mg tablet       EPINEPHrine (EPIPEN) 0.3 mg/0.3 mL SOAJ Inject 0.3 mL (0.3 mg total) into a muscle once for 1 dose 0.6 mL 0    folic acid (FOLVITE) 1 mg tablet Take 1 tablet (1 mg total) by mouth daily 90 tablet 1    hydroxychloroquine (PLAQUENIL) 200 mg tablet Take 1.5 tablets (300 mg total) by mouth in the morning 135 tablet 1    hydrOXYzine HCL (ATARAX) 25 mg tablet 1-3 a day as needed (Patient not taking: Reported on 1/29/2024)      ketoconazole (NIZORAL) 2 % cream Apply topically daily To neck (Patient not taking: Reported on 9/15/2023) 30 g 0    ketorolac (TORADOL) 10 mg tablet Take 1 tablet (10 mg total) by mouth every 6 (six) hours as needed for moderate pain for up to 5 days 20 tablet 2    methotrexate 2.5 MG tablet 5 tablet po weekly (take all 5 tablets on the same day every week) 75 tablet 1    methylPREDNISolone 4 MG tablet therapy pack Use as directed on package (Patient not taking: Reported on 5/6/2024) 21 each 0    montelukast (SINGULAIR) 10 mg tablet TAKE 1 TABLET BY MOUTH DAILY AT BEDTIME (Patient not taking: Reported on 9/15/2023) 30 tablet 0    Multiple Vitamin (MULTIVITAMIN) tablet Take 1 tablet by mouth daily      naproxen (Naprosyn) 500 mg tablet Take 1 tablet (500 mg total) by mouth 2 (two) times a day with meals As needed for joint pain 180 tablet 1    norethindrone (Ortho Micronor) 0.35 MG tablet Take 1 tablet (0.35 mg total) by mouth daily 84 tablet 1    predniSONE 5 mg tablet 4 tabs x7 days, then 3 tabs x7 days, then 2 tabs x7 days, then 1 tab x7 days, then stop. (Patient not taking: Reported on 3/19/2024) 70 tablet 0    Spiriva Respimat 1.25 MCG/ACT AERS inhaler INHALE TWO  PUFFS BY MOUTH DAILY 4 g 0    Symbicort 160-4.5 MCG/ACT inhaler INHALE TWO PUFFS BY MOUTH TWICE DAILY. RINSE MOUTH AFTER USE. 10.2 g 11     No current facility-administered medications for this visit.       Allergies  Allergies   Allergen Reactions    Gluten Meal - Food Allergy          The following portions of the patient's history were reviewed and updated as appropriate: allergies, current medications, past medical history, past social history, past surgical history and problem list.      Vitals  Vitals:    05/29/24 0847   BP: 107/73   Pulse: 100   Temp: 98.6 °F (37 °C)   Weight: 68.5 kg (151 lb)         Physical Exam  Constitutional   General appearance: Patient is seated and in no acute distress, well appearing and well nourished.   Head and Face   Head and face: Normal.    Eyes   Conjunctiva and lids: No erythema, swelling or discharge.  Anicteric.  Ears, Nose, Mouth, and Throat   Hearing: Normal.    Neck: Supple, trachea midline.  Pulmonary   Respiratory effort: No increased work of breathing or signs of respiratory distress.    Cardiovascular   Examination of extremities for edema and/or varicosities: Normal.    Musculoskeletal   Gait and station: Normal   Skin   Skin and subcutaneous tissue: Warm, dry, and intact. No visible jaundice, lesions or rashes.  Psychiatric   Judgment and insight: Normal  Recent and remote memory:  Normal  Mood and affect: Normal      Results  No visits with results within 1 Day(s) from this visit.   Latest known visit with results is:   Office Visit on 05/06/2024   Component Date Value     COLOR,UA 05/06/2024 yellow     CLARITY,UA 05/06/2024 clear     SPECIFIC GRAVITY,UA 05/06/2024 1.025      PH,UA 05/06/2024 6.5     LEUKOCYTE ESTERASE,UA 05/06/2024 trace     NITRITE,UA 05/06/2024 negative     GLUCOSE, UA 05/06/2024 negative     KETONES,UA 05/06/2024 negative     BILIRUBIN,UA 05/06/2024 negative     BLOOD,UA 05/06/2024 negative     POCT URINE PROTEIN 05/06/2024 trace     SL AMB  POCT UROBILINOGEN 05/06/2024 0.2E.U        Radiology Results  No results found.    Orders  No orders of the defined types were placed in this encounter.      Answers submitted by the patient for this visit:  Abdominal Pain Questionnaire (Submitted on 5/22/2024)  Chief Complaint: Abdominal pain  Chronicity: recurrent  Onset: more than 1 month ago  Onset quality: gradual  Frequency: intermittently  Episode duration: 6 Hours  Progression since onset: gradually improving  Pain location: suprapubic region, right flank  Pain - numeric: 2/10  Pain quality: cramping  Radiates to: does not radiate  anorexia: No  belching: No  flatus: No  hematochezia: Yes  melena: No  weight loss: No  Aggravated by: nothing  Relieved by: nothing  Diagnostic workup: ultrasound

## 2024-05-29 NOTE — PROGRESS NOTES
Ambulatory Visit  Name: Laura Christiansen      : 1990      MRN: [id number]  Encounter Provider: Sharlene Stephens PA-C  Encounter Date: 2024   Encounter department: [facility]    Assessment & Plan   1. Menorrhagia with irregular cycle  Assessment & Plan:  Patient with irregular menstrual cycle this month, has had her cycle twice this month. This cycle has very heavy bleeding, which is likely resulting in her fatigue. She recently had a pelvic US which showed right post ovulation cyst. She was also started on a progesterone only pill by OBGYN. Recommended follow up with OBGYN if her bleeding persists for longer than 10 days. She states the heavy bleeding is starting to improve, which will likely help with her fatigue as well.   2. Fatigue, unspecified type  Assessment & Plan:  Patient with fatigue since last Thursday, when her second menstrual cycle of the week started. She has had persistent and worsening fatigue since then. Her GI specialist has ordered several labs for her to complete, which are still in progress. Patient's Hgb and RBC are normal according to CBC results. Iron panel still in process. Patient is also having rectal bleeding, GI has sent her for stool samples and cannot get her in for the colonoscopy until August.     Discussed with patient that I am primarily worried about her iron level at this time, and I do not have any other suggestions to include in her lab work, I agree with GI's work up.     Patient did recently start progesterone only OCP, which may be related to her abnormal extra menstrual cycle this month, if her cycle lasts for longer than 10 days, I would recommend sooner follow up with OBGYN. Patient is agreeable with this recommendation.     Patient and I discussed warning signs that she should go to the ER for, including lightheadedness, dizziness, presyncopal symptoms, and shortness of breath. Discussed with her she may need IV fluids or iron  infusion if these symptoms occur.   Orders:  -     Vitamin B12; Future; Expected date: 05/29/2024  3. Urinary tract infection with hematuria, site unspecified  Assessment & Plan:  Recheck urine dip to ensure UTI resolved. With patient's new onset fatigue, concerned for possible recurrent UTI or pyelonephritis.     Urine dip today - no infection present, trace blood which is likely due to current menstrual cycle.   Orders:  -     POCT urine dip auto non-scope  4. Vitamin D deficiency  -     Vitamin D 25 hydroxy; Future; Expected date: 05/29/2024       History of Present Illness     Patient presents today for evaluation of fatigue. Her fatigue started on Thursday and has been worsening since. She started having a very heavy period last Thursday, which is her second menstrual cycle this month. She has never had an abnormal cycle in the past. She recently received a pelvic US and was started on progesterone only OCPs. She has had rectal bleeding for the past month, she saw GI today who have started a very comprehensive work up for her, which is still in progress.     Fatigue  Associated symptoms include fatigue. Pertinent negatives include no abdominal pain, chest pain, chills, coughing, fever, headaches, nausea or vomiting.       Review of Systems   Constitutional:  Positive for appetite change and fatigue. Negative for chills and fever.   Respiratory:  Negative for cough, chest tightness and shortness of breath.    Cardiovascular:  Negative for chest pain, palpitations and leg swelling.   Gastrointestinal:  Positive for anal bleeding, blood in stool and diarrhea. Negative for abdominal pain, nausea and vomiting.   Genitourinary:  Positive for menstrual problem. Negative for dysuria, hematuria and urgency.   Neurological:  Negative for dizziness, light-headedness and headaches.       Objective     BP 92/70 (BP Location: Left arm, Patient Position: Sitting, Cuff Size: Adult)   Pulse (!) 106   Temp 97.7 °F (36.5 °C)    "Ht 5' 5\" (1.651 m)   Wt 69.6 kg (153 lb 6.4 oz)   LMP 05/26/2024 (Exact Date)   SpO2 97%   BMI 25.53 kg/m²     Physical Exam  Vitals and nursing note reviewed.   Constitutional:       General: She is not in acute distress.     Appearance: Normal appearance. She is ill-appearing.   HENT:      Head: Normocephalic and atraumatic.   Cardiovascular:      Rate and Rhythm: Normal rate and regular rhythm.      Heart sounds: No murmur heard.  Pulmonary:      Effort: Pulmonary effort is normal. No respiratory distress.      Breath sounds: Normal breath sounds.   Musculoskeletal:      Right lower leg: No edema.      Left lower leg: No edema.   Skin:     General: Skin is warm and dry.      Coloration: Skin is not cyanotic or pale.   Neurological:      General: No focal deficit present.      Mental Status: She is alert and oriented to person, place, and time.   Psychiatric:         Mood and Affect: Mood normal.         Behavior: Behavior normal.         Judgment: Judgment normal.       Administrative Statements     Sharlene Stephens PA-C  [facility]  "

## 2024-05-29 NOTE — ASSESSMENT & PLAN NOTE
Patient with irregular menstrual cycle this month, has had her cycle twice this month. This cycle has very heavy bleeding, which is likely resulting in her fatigue. She recently had a pelvic US which showed right post ovulation cyst. She was also started on a progesterone only pill by OBGYN. Recommended follow up with OBGYN if her bleeding persists for longer than 10 days. She states the heavy bleeding is starting to improve, which will likely help with her fatigue as well.

## 2024-05-29 NOTE — ASSESSMENT & PLAN NOTE
Recheck urine dip to ensure UTI resolved. With patient's new onset fatigue, concerned for possible recurrent UTI or pyelonephritis.     Urine dip today - no infection present, trace blood which is likely due to current menstrual cycle.

## 2024-05-29 NOTE — TELEPHONE ENCOUNTER
Procedure: Colonoscopy   Date: 08/14/2024  Physician performing: Dr. Dunlap  Location of procedure:  [place]  Instructions given to patient: Miralax  Diabetic: N/A  Clearances: N/A

## 2024-05-30 ENCOUNTER — APPOINTMENT (OUTPATIENT)
Dept: LAB | Facility: MEDICAL CENTER | Age: 34
End: 2024-05-30
Payer: COMMERCIAL

## 2024-05-30 DIAGNOSIS — R19.7 DIARRHEA, UNSPECIFIED TYPE: ICD-10-CM

## 2024-05-30 DIAGNOSIS — K92.1 BLOOD IN STOOL: ICD-10-CM

## 2024-05-30 LAB — TTG IGA SER-ACNC: <2 U/ML (ref 0–3)

## 2024-05-30 PROCEDURE — 87209 SMEAR COMPLEX STAIN: CPT

## 2024-05-30 PROCEDURE — 87177 OVA AND PARASITES SMEARS: CPT

## 2024-05-30 PROCEDURE — 83993 ASSAY FOR CALPROTECTIN FECAL: CPT

## 2024-05-30 PROCEDURE — 87493 C DIFF AMPLIFIED PROBE: CPT

## 2024-05-30 PROCEDURE — 87505 NFCT AGENT DETECTION GI: CPT

## 2024-05-30 PROCEDURE — 87338 HPYLORI STOOL AG IA: CPT

## 2024-05-31 LAB
C COLI+JEJUNI TUF STL QL NAA+PROBE: NEGATIVE
C DIFF TOX GENS STL QL NAA+PROBE: NEGATIVE
EC STX1+STX2 GENES STL QL NAA+PROBE: NEGATIVE
H PYLORI AG STL QL IA: NEGATIVE
SALMONELLA SP SPAO STL QL NAA+PROBE: NEGATIVE
SHIGELLA SP+EIEC IPAH STL QL NAA+PROBE: NEGATIVE

## 2024-06-03 ENCOUNTER — LAB (OUTPATIENT)
Dept: LAB | Facility: MEDICAL CENTER | Age: 34
End: 2024-06-03
Payer: COMMERCIAL

## 2024-06-03 DIAGNOSIS — E55.9 VITAMIN D DEFICIENCY: ICD-10-CM

## 2024-06-03 DIAGNOSIS — R53.83 FATIGUE, UNSPECIFIED TYPE: ICD-10-CM

## 2024-06-03 LAB
25(OH)D3 SERPL-MCNC: 57.2 NG/ML (ref 30–100)
VIT B12 SERPL-MCNC: 188 PG/ML (ref 180–914)

## 2024-06-03 PROCEDURE — 82306 VITAMIN D 25 HYDROXY: CPT

## 2024-06-03 PROCEDURE — 36415 COLL VENOUS BLD VENIPUNCTURE: CPT

## 2024-06-03 PROCEDURE — 82607 VITAMIN B-12: CPT

## 2024-06-05 DIAGNOSIS — E53.8 B12 DEFICIENCY: Primary | ICD-10-CM

## 2024-06-05 DIAGNOSIS — K62.5 RECTAL BLEEDING: ICD-10-CM

## 2024-06-05 RX ORDER — CYANOCOBALAMIN 1000 UG/ML
1000 INJECTION, SOLUTION INTRAMUSCULAR; SUBCUTANEOUS WEEKLY
Status: SHIPPED | OUTPATIENT
Start: 2024-06-05 | End: 2024-07-03

## 2024-06-06 ENCOUNTER — CLINICAL SUPPORT (OUTPATIENT)
Dept: FAMILY MEDICINE CLINIC | Facility: CLINIC | Age: 34
End: 2024-06-06
Payer: COMMERCIAL

## 2024-06-06 DIAGNOSIS — E53.8 B12 DEFICIENCY: Primary | ICD-10-CM

## 2024-06-06 LAB — CALPROTECTIN STL-MCNT: 5 UG/G (ref 0–120)

## 2024-06-06 PROCEDURE — 96372 THER/PROPH/DIAG INJ SC/IM: CPT

## 2024-06-06 RX ADMIN — CYANOCOBALAMIN 1000 MCG: 1000 INJECTION, SOLUTION INTRAMUSCULAR; SUBCUTANEOUS at 08:58

## 2024-06-10 ENCOUNTER — APPOINTMENT (OUTPATIENT)
Dept: LAB | Facility: MEDICAL CENTER | Age: 34
End: 2024-06-10
Payer: COMMERCIAL

## 2024-06-10 DIAGNOSIS — Z79.899 HIGH RISK MEDICATION USE: ICD-10-CM

## 2024-06-10 DIAGNOSIS — M32.9 LUPUS (HCC): Primary | ICD-10-CM

## 2024-06-10 DIAGNOSIS — M32.9 LUPUS (HCC): ICD-10-CM

## 2024-06-10 LAB
ALBUMIN SERPL BCP-MCNC: 4.4 G/DL (ref 3.5–5)
ALP SERPL-CCNC: 34 U/L (ref 34–104)
ALT SERPL W P-5'-P-CCNC: 14 U/L (ref 7–52)
ANION GAP SERPL CALCULATED.3IONS-SCNC: 7 MMOL/L (ref 4–13)
AST SERPL W P-5'-P-CCNC: 15 U/L (ref 13–39)
BACTERIA UR QL AUTO: ABNORMAL /HPF
BASOPHILS # BLD AUTO: 0.04 THOUSANDS/ÂΜL (ref 0–0.1)
BASOPHILS NFR BLD AUTO: 1 % (ref 0–1)
BILIRUB SERPL-MCNC: 0.37 MG/DL (ref 0.2–1)
BILIRUB UR QL STRIP: NEGATIVE
BUN SERPL-MCNC: 11 MG/DL (ref 5–25)
C3 SERPL-MCNC: 94 MG/DL (ref 87–200)
C4 SERPL-MCNC: 29 MG/DL (ref 19–52)
CALCIUM SERPL-MCNC: 8.8 MG/DL (ref 8.4–10.2)
CHLORIDE SERPL-SCNC: 105 MMOL/L (ref 96–108)
CLARITY UR: CLEAR
CO2 SERPL-SCNC: 28 MMOL/L (ref 21–32)
COLOR UR: ABNORMAL
CREAT SERPL-MCNC: 0.65 MG/DL (ref 0.6–1.3)
CREAT UR-MCNC: 69 MG/DL
EOSINOPHIL # BLD AUTO: 0.21 THOUSAND/ÂΜL (ref 0–0.61)
EOSINOPHIL NFR BLD AUTO: 5 % (ref 0–6)
ERYTHROCYTE [DISTWIDTH] IN BLOOD BY AUTOMATED COUNT: 12.6 % (ref 11.6–15.1)
ERYTHROCYTE [SEDIMENTATION RATE] IN BLOOD: 3 MM/HOUR (ref 0–19)
GFR SERPL CREATININE-BSD FRML MDRD: 117 ML/MIN/1.73SQ M
GLUCOSE SERPL-MCNC: 100 MG/DL (ref 65–140)
GLUCOSE UR STRIP-MCNC: NEGATIVE MG/DL
HCT VFR BLD AUTO: 44.7 % (ref 34.8–46.1)
HGB BLD-MCNC: 15 G/DL (ref 11.5–15.4)
HGB UR QL STRIP.AUTO: NEGATIVE
IMM GRANULOCYTES # BLD AUTO: 0.01 THOUSAND/UL (ref 0–0.2)
IMM GRANULOCYTES NFR BLD AUTO: 0 % (ref 0–2)
KETONES UR STRIP-MCNC: NEGATIVE MG/DL
LEUKOCYTE ESTERASE UR QL STRIP: NEGATIVE
LYMPHOCYTES # BLD AUTO: 1.61 THOUSANDS/ÂΜL (ref 0.6–4.47)
LYMPHOCYTES NFR BLD AUTO: 35 % (ref 14–44)
MCH RBC QN AUTO: 32 PG (ref 26.8–34.3)
MCHC RBC AUTO-ENTMCNC: 33.6 G/DL (ref 31.4–37.4)
MCV RBC AUTO: 95 FL (ref 82–98)
MONOCYTES # BLD AUTO: 0.44 THOUSAND/ÂΜL (ref 0.17–1.22)
MONOCYTES NFR BLD AUTO: 10 % (ref 4–12)
MUCOUS THREADS UR QL AUTO: ABNORMAL
NEUTROPHILS # BLD AUTO: 2.26 THOUSANDS/ÂΜL (ref 1.85–7.62)
NEUTS SEG NFR BLD AUTO: 49 % (ref 43–75)
NITRITE UR QL STRIP: NEGATIVE
NON-SQ EPI CELLS URNS QL MICRO: ABNORMAL /HPF
NRBC BLD AUTO-RTO: 0 /100 WBCS
PH UR STRIP.AUTO: 7 [PH]
PLATELET # BLD AUTO: 249 THOUSANDS/UL (ref 149–390)
PMV BLD AUTO: 9.2 FL (ref 8.9–12.7)
POTASSIUM SERPL-SCNC: 4.2 MMOL/L (ref 3.5–5.3)
PROT SERPL-MCNC: 6.6 G/DL (ref 6.4–8.4)
PROT UR STRIP-MCNC: ABNORMAL MG/DL
PROT UR-MCNC: 4 MG/DL
PROT/CREAT UR: 0.06 MG/G{CREAT} (ref 0–0.1)
RBC # BLD AUTO: 4.69 MILLION/UL (ref 3.81–5.12)
RBC #/AREA URNS AUTO: ABNORMAL /HPF
SODIUM SERPL-SCNC: 140 MMOL/L (ref 135–147)
SP GR UR STRIP.AUTO: 1.01 (ref 1–1.03)
UROBILINOGEN UR STRIP-ACNC: <2 MG/DL
WBC # BLD AUTO: 4.57 THOUSAND/UL (ref 4.31–10.16)
WBC #/AREA URNS AUTO: ABNORMAL /HPF

## 2024-06-10 PROCEDURE — 36415 COLL VENOUS BLD VENIPUNCTURE: CPT | Performed by: INTERNAL MEDICINE

## 2024-06-10 PROCEDURE — 85025 COMPLETE CBC W/AUTO DIFF WBC: CPT

## 2024-06-10 PROCEDURE — 85652 RBC SED RATE AUTOMATED: CPT | Performed by: INTERNAL MEDICINE

## 2024-06-10 PROCEDURE — 86225 DNA ANTIBODY NATIVE: CPT | Performed by: INTERNAL MEDICINE

## 2024-06-10 PROCEDURE — 82570 ASSAY OF URINE CREATININE: CPT

## 2024-06-10 PROCEDURE — 86160 COMPLEMENT ANTIGEN: CPT | Performed by: INTERNAL MEDICINE

## 2024-06-10 PROCEDURE — 80053 COMPREHEN METABOLIC PANEL: CPT

## 2024-06-10 PROCEDURE — 81001 URINALYSIS AUTO W/SCOPE: CPT

## 2024-06-10 PROCEDURE — 84156 ASSAY OF PROTEIN URINE: CPT

## 2024-06-11 LAB — DSDNA AB SER-ACNC: 9 IU/ML (ref 0–9)

## 2024-06-12 NOTE — PROGRESS NOTES
Diagnoses and all orders for this visit:    Blood in stool       Blood in stool  Patient presents for evaluation of rectal bleeding.  Patient notes over the past month or so she has had progressive symptoms of change in bowel habits and rectal bleeding.  Patient notes her baseline is to have 1 bowel movement every day or every other day.  Recently this is increased to up to 4 bowel movements a day.  These will be loose to soft.  She has some abdominal discomfort as well as blood mixed in with the stool as well as mucus.  She notes that symptoms have worsened over this past few weeks.  No prior symptoms of significance.  She has seen GI medicine is scheduled for colonoscopy and EGD in August.  She also describes profound fatigue.  Recent lab evaluation was largely unremarkable with normal hemoglobin.  She also notes other symptoms to include easy bruisability.  Examination in the office today shows minimal internal hemorrhoidal disease.  No signs of active proctitis on examination.    In terms of her symptoms, she has had a crescendoing symptoms of change in bowel habits as well as rectal bleeding.  While these are most often benign and inflammatory related, more concerning etiologies could be present given the change in this over time.  For that I recommend colonoscopy.  We will see if we can facilitate this with GI medicine, otherwise I am a schedule her with myself if that is best from a scheduling point of view.  We discussed that most disease processes that require treatment for the symptoms that she has will likely be treated more medically upfront than surgically.  Risks and benefits of colonoscopy reviewed with patient to include, but not be limited to: anesthesia, bleeding, missed lesion and perforation requiring surgery.  Patient consents to procedure.      ELIZA Christiansen is a 33 y.o. female referred for evaluation by LAURO Stephens, today for rectal bleeding.     Patient notices blood in stool.  And mucus.    Bowel movements can be diarrhea or constipation. Started in April     Last colonoscopy was done 5/9/18      Past Medical History:   Diagnosis Date    Anxiety     Asthma December 2022    Depression     Lupus (HCC) 2024    Miscarriage 2011    Pilonidal cyst with abscess     Seasonal allergies     Varicella     Wears glasses      Past Surgical History:   Procedure Laterality Date    CARDIAC CATHETERIZATION  8/29/2022    Procedure: Cardiac catheterization;  Surgeon: Jethro Hood MD;  Location: [facility];  Service: Cardiology    CARDIAC CATHETERIZATION N/A 8/29/2022    Procedure: Cardiac Coronary Angiogram;  Surgeon: Jethro Hood MD;  Location: [facility];  Service: Cardiology    CHOLECYSTECTOMY  08/2010    Laparoscopic    COLONOSCOPY N/A 5/9/2018    Procedure: COLONOSCOPY;  Surgeon: Zaid Dunlap MD;  Location: [facility];  Service: Gastroenterology    MT EXCISION PILONIDAL CYST/SINUS SIMPLE N/A 4/28/2016    Procedure: EXCISION PILONIDAL CYST;  Surgeon: Neena Levine MD;  [facility]: [facility];  Service: General    MT LAPAROSCOPY W/RMVL ADNEXAL STRUCTURES Bilateral 10/27/2016    Procedure: SALPINGECTOMY;  Surgeon: Elly Brothers MD;  Location: [facility];  Service: Gynecology    SALPINGECTOMY      WISDOM TOOTH EXTRACTION  01/2016    right upper only       Current Outpatient Medications:     albuterol (PROVENTIL HFA,VENTOLIN HFA) 90 mcg/act inhaler, Inhale 2 puffs every 4 (four) hours as needed for wheezing, Disp: 18 g, Rfl: 4    ARIPiprazole (ABILIFY) 5 mg tablet, Take 1 tablet (5 mg total) by mouth daily at bedtime (Patient not taking: Reported on 5/6/2024), Disp: 30 tablet, Rfl: 1    Butalbital-APAP-Caffeine (Fioricet) -40 MG CAPS, Take 1 capsule by mouth 4 (four) times a day as needed (headache) (Patient not taking: Reported on 5/29/2024), Disp: 20 capsule, Rfl: 0    Cholecalciferol (Vitamin D-3) 125 MCG (5000 UT) TABS, Take by mouth, Disp: , Rfl:     citalopram  (CeleXA) 40 mg tablet, , Disp: , Rfl:     EPINEPHrine (EPIPEN) 0.3 mg/0.3 mL SOAJ, Inject 0.3 mL (0.3 mg total) into a muscle once for 1 dose, Disp: 0.6 mL, Rfl: 0    folic acid (FOLVITE) 1 mg tablet, Take 1 tablet (1 mg total) by mouth daily, Disp: 90 tablet, Rfl: 1    hydroxychloroquine (PLAQUENIL) 200 mg tablet, Take 1.5 tablets (300 mg total) by mouth in the morning, Disp: 135 tablet, Rfl: 1    hydrOXYzine HCL (ATARAX) 25 mg tablet, 1-3 a day as needed (Patient not taking: Reported on 1/29/2024), Disp: , Rfl:     ketoconazole (NIZORAL) 2 % cream, Apply topically daily To neck (Patient not taking: Reported on 9/15/2023), Disp: 30 g, Rfl: 0    ketorolac (TORADOL) 10 mg tablet, Take 1 tablet (10 mg total) by mouth every 6 (six) hours as needed for moderate pain for up to 5 days, Disp: 20 tablet, Rfl: 2    methotrexate 2.5 MG tablet, 5 tablet po weekly (take all 5 tablets on the same day every week), Disp: 75 tablet, Rfl: 1    methylPREDNISolone 4 MG tablet therapy pack, Use as directed on package (Patient not taking: Reported on 5/6/2024), Disp: 21 each, Rfl: 0    montelukast (SINGULAIR) 10 mg tablet, TAKE 1 TABLET BY MOUTH DAILY AT BEDTIME (Patient not taking: Reported on 9/15/2023), Disp: 30 tablet, Rfl: 0    Multiple Vitamin (MULTIVITAMIN) tablet, Take 1 tablet by mouth daily, Disp: , Rfl:     naproxen (Naprosyn) 500 mg tablet, Take 1 tablet (500 mg total) by mouth 2 (two) times a day with meals As needed for joint pain, Disp: 180 tablet, Rfl: 1    norethindrone (Ortho Micronor) 0.35 MG tablet, Take 1 tablet (0.35 mg total) by mouth daily, Disp: 84 tablet, Rfl: 1    predniSONE 5 mg tablet, 4 tabs x7 days, then 3 tabs x7 days, then 2 tabs x7 days, then 1 tab x7 days, then stop. (Patient not taking: Reported on 3/19/2024), Disp: 70 tablet, Rfl: 0    Spiriva Respimat 1.25 MCG/ACT AERS inhaler, INHALE TWO PUFFS BY MOUTH DAILY, Disp: 4 g, Rfl: 0    Symbicort 160-4.5 MCG/ACT inhaler, INHALE TWO PUFFS BY MOUTH TWICE  DAILY. RINSE MOUTH AFTER USE., Disp: 10.2 g, Rfl: 11    Current Facility-Administered Medications:     cyanocobalamin injection 1,000 mcg, 1,000 mcg, Intramuscular, Weekly, , 1,000 mcg at 06/13/24 0900  Allergies as of 06/13/2024 - Reviewed 06/13/2024   Allergen Reaction Noted    Gluten meal - food allergy  01/16/2019     Review of Systems   Constitutional:  Positive for activity change and fatigue.   Gastrointestinal:  Positive for anal bleeding and diarrhea.   Hematological:  Bruises/bleeds easily.   All other systems reviewed and are negative.    There were no vitals filed for this visit.  Physical Exam  Constitutional:       Appearance: Normal appearance.   HENT:      Head: Normocephalic and atraumatic.   Eyes:      Extraocular Movements: Extraocular movements intact.      Pupils: Pupils are equal, round, and reactive to light.   Pulmonary:      Effort: Pulmonary effort is normal.   Musculoskeletal:         General: Normal range of motion.   Skin:     General: Skin is warm and dry.   Neurological:      General: No focal deficit present.      Mental Status: She is alert and oriented to person, place, and time.   Psychiatric:         Mood and Affect: Mood normal.         Behavior: Behavior normal.         Thought Content: Thought content normal.         Judgment: Judgment normal.     [facility]    Date/Time: 6/13/2024 3:00 PM    Performed by: Caesar Jernigan MD  Authorized by: Caesar Jernigan MD    Verbal consent obtained?: Yes    Risks and benefits: Risks, benefits and alternatives were discussed    Consent given by:  Patient  Scope type:  Anoscope  External exam performed: Yes    Pilonidal sinus tract: No    Pilonidal cyst: No    Pilonidal tenderness: No    Perianal skin tags: No    Perirectal warts: No    Perianal maceration: No    Perianal induration: No    Perianal erythema: No    External hemorrhoids: No    Digital exam performed: Yes    Laxity of anal sphincter: No    Internal hemorrhoids:  Yes    Prolapsed: No    Diverticulosis: No    Intraluminal mass: No    Inflammation: No    Anal fissures: No    Anal fistulae: No    Anal stricture: No    Abscess: No    Procedure termination:  Procedure complete  Patient tolerance:  Patient tolerated the procedure well with no immediate complications

## 2024-06-13 ENCOUNTER — CLINICAL SUPPORT (OUTPATIENT)
Dept: FAMILY MEDICINE CLINIC | Facility: CLINIC | Age: 34
End: 2024-06-13
Payer: COMMERCIAL

## 2024-06-13 ENCOUNTER — OFFICE VISIT (OUTPATIENT)
Age: 34
End: 2024-06-13
Payer: COMMERCIAL

## 2024-06-13 VITALS — BODY MASS INDEX: 25.16 KG/M2 | HEIGHT: 65 IN | WEIGHT: 151 LBS

## 2024-06-13 DIAGNOSIS — K92.1 BLOOD IN STOOL: Primary | ICD-10-CM

## 2024-06-13 DIAGNOSIS — E53.8 B12 DEFICIENCY: Primary | ICD-10-CM

## 2024-06-13 PROCEDURE — 46600 DIAGNOSTIC ANOSCOPY SPX: CPT | Performed by: COLON & RECTAL SURGERY

## 2024-06-13 PROCEDURE — 96372 THER/PROPH/DIAG INJ SC/IM: CPT

## 2024-06-13 PROCEDURE — 99243 OFF/OP CNSLTJ NEW/EST LOW 30: CPT | Performed by: COLON & RECTAL SURGERY

## 2024-06-13 RX ADMIN — CYANOCOBALAMIN 1000 MCG: 1000 INJECTION, SOLUTION INTRAMUSCULAR; SUBCUTANEOUS at 09:00

## 2024-06-13 NOTE — LETTER
June 13, 2024     Sharlene Stephens PA-C  [address]   [address]  [facility] [address]    Patient: Laura Christiansen   YOB: 1990   Date of Visit: 6/13/2024       Dear Dr. Stephens:    Thank you for referring Laura Christiansen to me for evaluation. Below are my notes for this consultation.    If you have questions, please do not hesitate to call me. I look forward to following your patient along with you.         Sincerely,        Caesar Jernigan MD        CC: MD Caesar Blanca MD  6/13/2024  3:28 PM  Incomplete  Diagnoses and all orders for this visit:    Blood in stool       Blood in stool  Patient presents for evaluation of rectal bleeding.  Patient notes over the past month or so she has had progressive symptoms of change in bowel habits and rectal bleeding.  Patient notes her baseline is to have 1 bowel movement every day or every other day.  Recently this is increased to up to 4 bowel movements a day.  These will be loose to soft.  She has some abdominal discomfort as well as blood mixed in with the stool as well as mucus.  She notes that symptoms have worsened over this past few weeks.  No prior symptoms of significance.  She has seen GI medicine is scheduled for colonoscopy and EGD in August.  She also describes profound fatigue.  Recent lab evaluation was largely unremarkable with normal hemoglobin.  She also notes other symptoms to include easy bruisability.  Examination in the office today shows minimal internal hemorrhoidal disease.  No signs of active proctitis on examination.    In terms of her symptoms, she has had a crescendoing symptoms of change in bowel habits as well as rectal bleeding.  While these are most often benign and inflammatory related, more concerning etiologies could be present given the change in this over time.  For that I recommend colonoscopy.  We will see if we can facilitate this with GI medicine, otherwise I am a schedule her with myself if  that is best from a scheduling point of view.  We discussed that most disease processes that require treatment for the symptoms that she has will likely be treated more medically upfront than surgically.  Risks and benefits of colonoscopy reviewed with patient to include, but not be limited to: anesthesia, bleeding, missed lesion and perforation requiring surgery.  Patient consents to procedure.      ELIZA Christiansen is a 33 y.o. female referred for evaluation by LAURO Stephens, today for rectal bleeding.     Patient notices blood in stool. And mucus.    Bowel movements can be diarrhea or constipation. Started in April     Last colonoscopy was done 5/9/18      Past Medical History:   Diagnosis Date   • Anxiety    • Asthma December 2022   • Depression    • Lupus (HCC) 2024   • Miscarriage 2011   • Pilonidal cyst with abscess    • Seasonal allergies    • Varicella    • Wears glasses      Past Surgical History:   Procedure Laterality Date   • CARDIAC CATHETERIZATION  8/29/2022    Procedure: Cardiac catheterization;  Surgeon: Jethro Hood MD;  [facility];  Service: Cardiology   • CARDIAC CATHETERIZATION N/A 8/29/2022    Procedure: Cardiac Coronary Angiogram;  Surgeon: Jethro Hood MD;  [facility];  Service: Cardiology   • CHOLECYSTECTOMY  08/2010    Laparoscopic   • COLONOSCOPY N/A 5/9/2018    Procedure: COLONOSCOPY;  Surgeon: Zaid Dunlap MD;  Location: [facility];  Service: Gastroenterology   • CT EXCISION PILONIDAL CYST/SINUS SIMPLE N/A 4/28/2016    Procedure: EXCISION PILONIDAL CYST;  Surgeon: Neena Levine MD;  [facility];  Service: General   • CT LAPAROSCOPY W/RMVL ADNEXAL STRUCTURES Bilateral 10/27/2016    Procedure: SALPINGECTOMY;  Surgeon: Elly Brothers MD;  [facility];  Service: Gynecology   • SALPINGECTOMY     • WISDOM TOOTH EXTRACTION  01/2016    right upper only       Current Outpatient Medications:   •  albuterol (PROVENTIL  HFA,VENTOLIN HFA) 90 mcg/act inhaler, Inhale 2 puffs every 4 (four) hours as needed for wheezing, Disp: 18 g, Rfl: 4  •  ARIPiprazole (ABILIFY) 5 mg tablet, Take 1 tablet (5 mg total) by mouth daily at bedtime (Patient not taking: Reported on 5/6/2024), Disp: 30 tablet, Rfl: 1  •  Butalbital-APAP-Caffeine (Fioricet) -40 MG CAPS, Take 1 capsule by mouth 4 (four) times a day as needed (headache) (Patient not taking: Reported on 5/29/2024), Disp: 20 capsule, Rfl: 0  •  Cholecalciferol (Vitamin D-3) 125 MCG (5000 UT) TABS, Take by mouth, Disp: , Rfl:   •  citalopram (CeleXA) 40 mg tablet, , Disp: , Rfl:   •  EPINEPHrine (EPIPEN) 0.3 mg/0.3 mL SOAJ, Inject 0.3 mL (0.3 mg total) into a muscle once for 1 dose, Disp: 0.6 mL, Rfl: 0  •  folic acid (FOLVITE) 1 mg tablet, Take 1 tablet (1 mg total) by mouth daily, Disp: 90 tablet, Rfl: 1  •  hydroxychloroquine (PLAQUENIL) 200 mg tablet, Take 1.5 tablets (300 mg total) by mouth in the morning, Disp: 135 tablet, Rfl: 1  •  hydrOXYzine HCL (ATARAX) 25 mg tablet, 1-3 a day as needed (Patient not taking: Reported on 1/29/2024), Disp: , Rfl:   •  ketoconazole (NIZORAL) 2 % cream, Apply topically daily To neck (Patient not taking: Reported on 9/15/2023), Disp: 30 g, Rfl: 0  •  ketorolac (TORADOL) 10 mg tablet, Take 1 tablet (10 mg total) by mouth every 6 (six) hours as needed for moderate pain for up to 5 days, Disp: 20 tablet, Rfl: 2  •  methotrexate 2.5 MG tablet, 5 tablet po weekly (take all 5 tablets on the same day every week), Disp: 75 tablet, Rfl: 1  •  methylPREDNISolone 4 MG tablet therapy pack, Use as directed on package (Patient not taking: Reported on 5/6/2024), Disp: 21 each, Rfl: 0  •  montelukast (SINGULAIR) 10 mg tablet, TAKE 1 TABLET BY MOUTH DAILY AT BEDTIME (Patient not taking: Reported on 9/15/2023), Disp: 30 tablet, Rfl: 0  •  Multiple Vitamin (MULTIVITAMIN) tablet, Take 1 tablet by mouth daily, Disp: , Rfl:   •  naproxen (Naprosyn) 500 mg tablet, Take 1  tablet (500 mg total) by mouth 2 (two) times a day with meals As needed for joint pain, Disp: 180 tablet, Rfl: 1  •  norethindrone (Ortho Micronor) 0.35 MG tablet, Take 1 tablet (0.35 mg total) by mouth daily, Disp: 84 tablet, Rfl: 1  •  predniSONE 5 mg tablet, 4 tabs x7 days, then 3 tabs x7 days, then 2 tabs x7 days, then 1 tab x7 days, then stop. (Patient not taking: Reported on 3/19/2024), Disp: 70 tablet, Rfl: 0  •  Spiriva Respimat 1.25 MCG/ACT AERS inhaler, INHALE TWO PUFFS BY MOUTH DAILY, Disp: 4 g, Rfl: 0  •  Symbicort 160-4.5 MCG/ACT inhaler, INHALE TWO PUFFS BY MOUTH TWICE DAILY. RINSE MOUTH AFTER USE., Disp: 10.2 g, Rfl: 11    Current Facility-Administered Medications:   •  cyanocobalamin injection 1,000 mcg, 1,000 mcg, Intramuscular, Weekly, , 1,000 mcg at 06/13/24 0900  Allergies as of 06/13/2024 - Reviewed 06/13/2024   Allergen Reaction Noted   • Gluten meal - food allergy  01/16/2019     Review of Systems   Constitutional:  Positive for activity change and fatigue.   Gastrointestinal:  Positive for anal bleeding and diarrhea.   Hematological:  Bruises/bleeds easily.   All other systems reviewed and are negative.    There were no vitals filed for this visit.  Physical Exam  Constitutional:       Appearance: Normal appearance.   HENT:      Head: Normocephalic and atraumatic.   Eyes:      Extraocular Movements: Extraocular movements intact.      Pupils: Pupils are equal, round, and reactive to light.   Pulmonary:      Effort: Pulmonary effort is normal.   Musculoskeletal:         General: Normal range of motion.   Skin:     General: Skin is warm and dry.   Neurological:      General: No focal deficit present.      Mental Status: She is alert and oriented to person, place, and time.   Psychiatric:         Mood and Affect: Mood normal.         Behavior: Behavior normal.         Thought Content: Thought content normal.         Judgment: Judgment normal.     [facility] End[facility]    Date/Time: 6/13/2024 3:00  PM    Performed by: Caesar Jernigan MD  Authorized by: Caesar Jernigan MD    Verbal consent obtained?: Yes    Risks and benefits: Risks, benefits and alternatives were discussed    Consent given by:  Patient  Scope type:  Anoscope  External exam performed: Yes    Pilonidal sinus tract: No    Pilonidal cyst: No    Pilonidal tenderness: No    Perianal skin tags: No    Perirectal warts: No    Perianal maceration: No    Perianal induration: No    Perianal erythema: No    External hemorrhoids: No    Digital exam performed: Yes    Laxity of anal sphincter: No    Internal hemorrhoids: Yes    Prolapsed: No    Diverticulosis: No    Intraluminal mass: No    Inflammation: No    Anal fissures: No    Anal fistulae: No    Anal stricture: No    Abscess: No    Procedure termination:  Procedure complete  Patient tolerance:  Patient tolerated the procedure well with no immediate complications

## 2024-06-13 NOTE — ASSESSMENT & PLAN NOTE
Patient presents for evaluation of rectal bleeding.  Patient notes over the past month or so she has had progressive symptoms of change in bowel habits and rectal bleeding.  Patient notes her baseline is to have 1 bowel movement every day or every other day.  Recently this is increased to up to 4 bowel movements a day.  These will be loose to soft.  She has some abdominal discomfort as well as blood mixed in with the stool as well as mucus.  She notes that symptoms have worsened over this past few weeks.  No prior symptoms of significance.  She has seen GI medicine is scheduled for colonoscopy and EGD in August.  She also describes profound fatigue.  Recent lab evaluation was largely unremarkable with normal hemoglobin.  She also notes other symptoms to include easy bruisability.  Examination in the office today shows minimal internal hemorrhoidal disease.  No signs of active proctitis on examination.    In terms of her symptoms, she has had a crescendoing symptoms of change in bowel habits as well as rectal bleeding.  While these are most often benign and inflammatory related, more concerning etiologies could be present given the change in this over time.  For that I recommend colonoscopy.  We will see if we can facilitate this with GI medicine, otherwise I am a schedule her with myself if that is best from a scheduling point of view.  We discussed that most disease processes that require treatment for the symptoms that she has will likely be treated more medically upfront than surgically.  Risks and benefits of colonoscopy reviewed with patient to include, but not be limited to: anesthesia, bleeding, missed lesion and perforation requiring surgery.  Patient consents to procedure.

## 2024-06-14 ENCOUNTER — TELEPHONE (OUTPATIENT)
Dept: GASTROENTEROLOGY | Facility: CLINIC | Age: 34
End: 2024-06-14

## 2024-06-14 NOTE — TELEPHONE ENCOUNTER
Spoke to pt - agreeable to schedule sooner    Procedure: Colon/EGD  Date: June 25  Physician performing: Dr. Redd  Location of procedure:  [place]  Instructions given to patient: miralax prep  Diabetic: n/a  Clearances: n/a

## 2024-06-18 ENCOUNTER — ANESTHESIA EVENT (OUTPATIENT)
Dept: ANESTHESIOLOGY | Facility: HOSPITAL | Age: 34
End: 2024-06-18

## 2024-06-18 ENCOUNTER — ANESTHESIA (OUTPATIENT)
Dept: ANESTHESIOLOGY | Facility: HOSPITAL | Age: 34
End: 2024-06-18

## 2024-06-20 ENCOUNTER — APPOINTMENT (OUTPATIENT)
Dept: LAB | Facility: CLINIC | Age: 34
End: 2024-06-20
Payer: COMMERCIAL

## 2024-06-20 ENCOUNTER — CLINICAL SUPPORT (OUTPATIENT)
Dept: FAMILY MEDICINE CLINIC | Facility: CLINIC | Age: 34
End: 2024-06-20
Payer: COMMERCIAL

## 2024-06-20 DIAGNOSIS — R79.89 ELEVATED PROLACTIN LEVEL: ICD-10-CM

## 2024-06-20 DIAGNOSIS — E53.8 B12 DEFICIENCY: Primary | ICD-10-CM

## 2024-06-20 LAB — PROLACTIN SERPL-MCNC: 14.29 NG/ML (ref 3.34–26.72)

## 2024-06-20 PROCEDURE — 96372 THER/PROPH/DIAG INJ SC/IM: CPT

## 2024-06-20 PROCEDURE — 36415 COLL VENOUS BLD VENIPUNCTURE: CPT

## 2024-06-20 PROCEDURE — 84146 ASSAY OF PROLACTIN: CPT

## 2024-06-20 RX ADMIN — CYANOCOBALAMIN 1000 MCG: 1000 INJECTION, SOLUTION INTRAMUSCULAR; SUBCUTANEOUS at 09:08

## 2024-06-24 RX ORDER — SODIUM CHLORIDE 9 MG/ML
125 INJECTION, SOLUTION INTRAVENOUS CONTINUOUS
Status: CANCELLED | OUTPATIENT
Start: 2024-06-24

## 2024-06-24 RX ORDER — ONDANSETRON 2 MG/ML
4 INJECTION INTRAMUSCULAR; INTRAVENOUS ONCE AS NEEDED
Status: CANCELLED | OUTPATIENT
Start: 2024-06-24

## 2024-06-24 RX ORDER — ALBUTEROL SULFATE 2.5 MG/3ML
2.5 SOLUTION RESPIRATORY (INHALATION) ONCE AS NEEDED
Status: CANCELLED | OUTPATIENT
Start: 2024-06-24

## 2024-06-25 ENCOUNTER — ANESTHESIA (OUTPATIENT)
Dept: GASTROENTEROLOGY | Facility: MEDICAL CENTER | Age: 34
End: 2024-06-25

## 2024-06-25 ENCOUNTER — HOSPITAL ENCOUNTER (OUTPATIENT)
Dept: GASTROENTEROLOGY | Facility: MEDICAL CENTER | Age: 34
Setting detail: OUTPATIENT SURGERY
Discharge: HOME | End: 2024-06-25
Payer: COMMERCIAL

## 2024-06-25 ENCOUNTER — ANESTHESIA EVENT (OUTPATIENT)
Dept: GASTROENTEROLOGY | Facility: MEDICAL CENTER | Age: 34
End: 2024-06-25

## 2024-06-25 VITALS
RESPIRATION RATE: 18 BRPM | BODY MASS INDEX: 24.99 KG/M2 | DIASTOLIC BLOOD PRESSURE: 56 MMHG | WEIGHT: 150 LBS | HEIGHT: 65 IN | HEART RATE: 71 BPM | OXYGEN SATURATION: 100 % | TEMPERATURE: 98.2 F | SYSTOLIC BLOOD PRESSURE: 102 MMHG

## 2024-06-25 DIAGNOSIS — R63.0 LACK OF APPETITE: ICD-10-CM

## 2024-06-25 DIAGNOSIS — K92.1 BLOOD IN STOOL: ICD-10-CM

## 2024-06-25 DIAGNOSIS — K29.70 GASTRITIS WITHOUT BLEEDING, UNSPECIFIED CHRONICITY, UNSPECIFIED GASTRITIS TYPE: Primary | ICD-10-CM

## 2024-06-25 DIAGNOSIS — R19.7 DIARRHEA, UNSPECIFIED TYPE: ICD-10-CM

## 2024-06-25 LAB
EXT PREGNANCY TEST URINE: NEGATIVE
EXT. CONTROL: NORMAL

## 2024-06-25 PROCEDURE — 43239 EGD BIOPSY SINGLE/MULTIPLE: CPT | Performed by: STUDENT IN AN ORGANIZED HEALTH CARE EDUCATION/TRAINING PROGRAM

## 2024-06-25 PROCEDURE — 88305 TISSUE EXAM BY PATHOLOGIST: CPT | Performed by: STUDENT IN AN ORGANIZED HEALTH CARE EDUCATION/TRAINING PROGRAM

## 2024-06-25 PROCEDURE — 88342 IMHCHEM/IMCYTCHM 1ST ANTB: CPT | Performed by: STUDENT IN AN ORGANIZED HEALTH CARE EDUCATION/TRAINING PROGRAM

## 2024-06-25 PROCEDURE — 81025 URINE PREGNANCY TEST: CPT | Performed by: ANESTHESIOLOGY

## 2024-06-25 PROCEDURE — 45380 COLONOSCOPY AND BIOPSY: CPT | Performed by: STUDENT IN AN ORGANIZED HEALTH CARE EDUCATION/TRAINING PROGRAM

## 2024-06-25 RX ORDER — ONDANSETRON 2 MG/ML
4 INJECTION INTRAMUSCULAR; INTRAVENOUS ONCE AS NEEDED
Status: DISCONTINUED | OUTPATIENT
Start: 2024-06-25 | End: 2024-06-29 | Stop reason: HOSPADM

## 2024-06-25 RX ORDER — PROPOFOL 10 MG/ML
INJECTION, EMULSION INTRAVENOUS AS NEEDED
Status: DISCONTINUED | OUTPATIENT
Start: 2024-06-25 | End: 2024-06-25

## 2024-06-25 RX ORDER — LIDOCAINE HYDROCHLORIDE 20 MG/ML
INJECTION, SOLUTION EPIDURAL; INFILTRATION; INTRACAUDAL; PERINEURAL AS NEEDED
Status: DISCONTINUED | OUTPATIENT
Start: 2024-06-25 | End: 2024-06-25

## 2024-06-25 RX ORDER — OMEPRAZOLE 20 MG/1
20 CAPSULE, DELAYED RELEASE ORAL DAILY
Qty: 90 CAPSULE | Refills: 0 | Status: SHIPPED | OUTPATIENT
Start: 2024-06-25

## 2024-06-25 RX ORDER — ALBUTEROL SULFATE 2.5 MG/3ML
2.5 SOLUTION RESPIRATORY (INHALATION) ONCE AS NEEDED
Status: DISCONTINUED | OUTPATIENT
Start: 2024-06-25 | End: 2024-06-29 | Stop reason: HOSPADM

## 2024-06-25 RX ORDER — SODIUM CHLORIDE, SODIUM LACTATE, POTASSIUM CHLORIDE, CALCIUM CHLORIDE 600; 310; 30; 20 MG/100ML; MG/100ML; MG/100ML; MG/100ML
INJECTION, SOLUTION INTRAVENOUS CONTINUOUS PRN
Status: DISCONTINUED | OUTPATIENT
Start: 2024-06-25 | End: 2024-06-25

## 2024-06-25 RX ORDER — SODIUM CHLORIDE 9 MG/ML
125 INJECTION, SOLUTION INTRAVENOUS CONTINUOUS
Status: DISCONTINUED | OUTPATIENT
Start: 2024-06-25 | End: 2024-06-29 | Stop reason: HOSPADM

## 2024-06-25 RX ADMIN — SODIUM CHLORIDE, SODIUM LACTATE, POTASSIUM CHLORIDE, AND CALCIUM CHLORIDE: .6; .31; .03; .02 INJECTION, SOLUTION INTRAVENOUS at 14:38

## 2024-06-25 RX ADMIN — PROPOFOL 150 MG: 10 INJECTION, EMULSION INTRAVENOUS at 14:41

## 2024-06-25 RX ADMIN — SODIUM CHLORIDE 125 ML/HR: 0.9 INJECTION, SOLUTION INTRAVENOUS at 14:24

## 2024-06-25 RX ADMIN — PROPOFOL 50 MG: 10 INJECTION, EMULSION INTRAVENOUS at 14:46

## 2024-06-25 RX ADMIN — PROPOFOL 40 MG: 10 INJECTION, EMULSION INTRAVENOUS at 14:48

## 2024-06-25 RX ADMIN — PROPOFOL 180 MCG/KG/MIN: 10 INJECTION, EMULSION INTRAVENOUS at 14:50

## 2024-06-25 RX ADMIN — PROPOFOL 20 MG: 10 INJECTION, EMULSION INTRAVENOUS at 14:44

## 2024-06-25 RX ADMIN — PROPOFOL 30 MG: 10 INJECTION, EMULSION INTRAVENOUS at 14:43

## 2024-06-25 RX ADMIN — LIDOCAINE HYDROCHLORIDE 100 MG: 20 INJECTION, SOLUTION EPIDURAL; INFILTRATION; INTRACAUDAL at 14:41

## 2024-06-25 NOTE — ANESTHESIA POSTPROCEDURE EVALUATION
Post-Op Assessment Note    CV Status:  Stable  Pain Score: 0    Pain management: adequate       Mental Status:  Sleepy   Hydration Status:  Stable   PONV Controlled:  None   Airway Patency:  Patent     Post Op Vitals Reviewed: Yes    No anethesia notable event occurred.    Staff: Anesthesiologist, CRNA               BP (!) 85/46 (06/25/24 1508)    Temp      Pulse 91 (06/25/24 1508)   Resp 18 (06/25/24 1508)    SpO2 98 % (06/25/24 1508)

## 2024-06-25 NOTE — ANESTHESIA PREPROCEDURE EVALUATION
Procedure:  EGD  COLONOSCOPY    Relevant Problems   CARDIO   (+) Pain of sternum   (+) Painful lumpy left breast      NEURO/PSYCH   (+) Generalized anxiety disorder   (+) Post-traumatic stress disorder, chronic   (+) Severe episode of recurrent major depressive disorder, without psychotic features (HCC)      PULMONARY   (+) Asthma   (+) Moderate persistent asthma with acute exacerbation        Physical Exam    Airway    Mallampati score: I  TM Distance: >3 FB  Neck ROM: full     Dental   No notable dental hx     Cardiovascular  Cardiovascular exam normal    Pulmonary  Pulmonary exam normal     Other Findings  post-pubertal.    Controlled asthma  Anesthesia Plan  ASA Score- 2     Anesthesia Type- IV sedation with anesthesia with ASA Monitors.         Additional Monitors:     Airway Plan:            Plan Factors-Exercise tolerance (METS): >4 METS.    Chart reviewed.    Patient summary reviewed.    Patient is not a current smoker.              Induction- intravenous.    Postoperative Plan-         Informed Consent- Anesthetic plan and risks discussed with patient.

## 2024-06-27 ENCOUNTER — CLINICAL SUPPORT (OUTPATIENT)
Dept: FAMILY MEDICINE CLINIC | Facility: CLINIC | Age: 34
End: 2024-06-27
Payer: COMMERCIAL

## 2024-06-27 DIAGNOSIS — E53.8 B12 DEFICIENCY: Primary | ICD-10-CM

## 2024-06-27 PROCEDURE — 96372 THER/PROPH/DIAG INJ SC/IM: CPT

## 2024-06-27 RX ADMIN — CYANOCOBALAMIN 1000 MCG: 1000 INJECTION, SOLUTION INTRAMUSCULAR; SUBCUTANEOUS at 08:47

## 2024-06-28 PROBLEM — R31.9 URINARY TRACT INFECTION WITH HEMATURIA: Status: RESOLVED | Noted: 2024-05-06 | Resolved: 2024-06-28

## 2024-06-28 PROBLEM — N39.0 URINARY TRACT INFECTION WITH HEMATURIA: Status: RESOLVED | Noted: 2024-05-06 | Resolved: 2024-06-28

## 2024-06-28 PROCEDURE — 88342 IMHCHEM/IMCYTCHM 1ST ANTB: CPT | Performed by: STUDENT IN AN ORGANIZED HEALTH CARE EDUCATION/TRAINING PROGRAM

## 2024-06-28 PROCEDURE — 88305 TISSUE EXAM BY PATHOLOGIST: CPT | Performed by: STUDENT IN AN ORGANIZED HEALTH CARE EDUCATION/TRAINING PROGRAM

## 2024-07-02 ENCOUNTER — TRANSITIONAL CARE MANAGEMENT (OUTPATIENT)
Dept: FAMILY MEDICINE CLINIC | Facility: CLINIC | Age: 34
End: 2024-07-02

## 2024-07-12 ENCOUNTER — OFFICE VISIT (OUTPATIENT)
Dept: OBGYN CLINIC | Facility: CLINIC | Age: 34
End: 2024-07-12
Payer: COMMERCIAL

## 2024-07-12 VITALS
BODY MASS INDEX: 25.12 KG/M2 | WEIGHT: 150.8 LBS | HEIGHT: 65 IN | SYSTOLIC BLOOD PRESSURE: 110 MMHG | DIASTOLIC BLOOD PRESSURE: 62 MMHG

## 2024-07-12 DIAGNOSIS — N92.1 MENORRHAGIA WITH IRREGULAR CYCLE: Primary | ICD-10-CM

## 2024-07-12 PROCEDURE — 99213 OFFICE O/P EST LOW 20 MIN: CPT | Performed by: OBSTETRICS & GYNECOLOGY

## 2024-07-12 NOTE — PROGRESS NOTES
"Subjective:     Laura Christiansen is a 34 y.o.  female who presents for follow up regarding menorrhagia and dysmenorrhea. Her bleeding and pain has improved with Micronor and toradol. She did have two periods in May but since then her menses have been one month apart. Overall she is doing well.     Objective:    Vitals: Blood pressure 110/62, height 5' 5\" (1.651 m), weight 68.4 kg (150 lb 12.8 oz), last menstrual period 2024.Body mass index is 25.09 kg/m².    Physical Exam  Constitutional:       Appearance: She is well-developed.   Cardiovascular:      Rate and Rhythm: Normal rate and regular rhythm.      Heart sounds: Normal heart sounds. No murmur heard.     No friction rub. No gallop.   Pulmonary:      Effort: Pulmonary effort is normal. No respiratory distress.      Breath sounds: No wheezing.   Abdominal:      Palpations: Abdomen is soft.      Tenderness: There is no abdominal tenderness.   Musculoskeletal:         General: No tenderness.   Neurological:      Mental Status: She is alert and oriented to person, place, and time.   Vitals reviewed.         Assessment/Plan:    Problem List Items Addressed This Visit       Menorrhagia with irregular cycle - Primary     Menses are regulated on Micronor  Pain controlled on toradol              Shaylee Hayden MD  2024  2:07 PM        "

## 2024-08-05 ENCOUNTER — NURSE TRIAGE (OUTPATIENT)
Age: 34
End: 2024-08-05

## 2024-08-05 ENCOUNTER — TELEPHONE (OUTPATIENT)
Age: 34
End: 2024-08-05

## 2024-08-05 ENCOUNTER — OFFICE VISIT (OUTPATIENT)
Dept: OBGYN CLINIC | Facility: CLINIC | Age: 34
End: 2024-08-05
Payer: COMMERCIAL

## 2024-08-05 VITALS
BODY MASS INDEX: 25.33 KG/M2 | HEIGHT: 65 IN | SYSTOLIC BLOOD PRESSURE: 106 MMHG | WEIGHT: 152 LBS | DIASTOLIC BLOOD PRESSURE: 70 MMHG

## 2024-08-05 DIAGNOSIS — N64.4 MASTALGIA: Primary | ICD-10-CM

## 2024-08-05 DIAGNOSIS — Z80.3 FAMILY HISTORY OF BREAST CANCER: ICD-10-CM

## 2024-08-05 PROCEDURE — 99213 OFFICE O/P EST LOW 20 MIN: CPT | Performed by: OBSTETRICS & GYNECOLOGY

## 2024-08-05 NOTE — TELEPHONE ENCOUNTER
Patient called stating over the weekend her L breast swelled up a cup size and is very painful and has white dot on/around the nipple . Patient states she does see womens health but wasn't sure who to see . Unable to connect with office/soonest availability Thursday 08/08  . Please return patient call asap on what to do .

## 2024-08-05 NOTE — TELEPHONE ENCOUNTER
"Patient calling to report concern or breast symptoms. Notes Saturday feeling that her bra was a little tight on her left breast.  She notes that Sunday her bra was much tighter than even Saturday, she started experiencing intermittent aching pains that seemed to radiate from her chest wall outward to her nipple. She additionally noted with clusters of white pin prick dots, a sort of dimple at the top of her areola, and her left nipple seemed to be pointing slightly upward. Today her left breast is visibly larger than the right and larger than normal. The pain is now constant and the breast in tender to touch. Not improved with tylenol. Negative pregnancy test, not breast feeding. Patient's father's side with significant breast CA history. Patient with history dense breast tissue. Patient scheduled for visit with Dr. Cummings this morning for exam.     Reason for Disposition   Change in shape or appearance of breast    Answer Assessment - Initial Assessment Questions  1. SYMPTOM: \"What's the main symptom you're concerned about?\"  (e.g., lump, pain, rash, nipple discharge)    Left breast swelling, overall tenderness, aching constant, no lump, white pin prick cluster, dimple at top of areola, appears nipple points slightly upward.  2. LOCATION: \"Where is the s/s located?\"      Left breast  3. ONSET: \"When did s/s  start?\"      Saturday  4. PRIOR HISTORY: \"Do you have any history of prior problems with your breasts?\" (e.g., lumps, cancer, fibrocystic breast disease)      Denies personal history outside of dense breast tissue - Breast cancer in father's side   5. CAUSE: \"What do you think is causing this symptom?\"      Unsure  6. OTHER SYMPTOMS: \"Do you have any other symptoms?\" (e.g., fever, breast pain, redness or rash, nipple discharge)      Denies  7. PREGNANCY-BREASTFEEDING: \"Is there any chance you are pregnant?\" \"When was your last menstrual period?\" \"Are you breastfeeding?\"      Denies    Protocols used: Breast " Symptoms-ADULT-[place]

## 2024-08-05 NOTE — PROGRESS NOTES
Patient is a 34 y.o.  with Patient's last menstrual period was 07/15/2024 (exact date). who presents requesting evaluation of breast pain. Pt reports that on Saturday when she put her bra on, she noted it felt tight on the left side. She also developed some achiness that radiated from the outer quadrant towards the nipple. She reports on  she noted the right breast was slightly full, but the left felt nagy and her nipple was actually sticking out of her bra due to the fullness. She also notes tenderness to touch. She denies any trauma, breast masses or nipple drainage. She is taking her POPs daily and menstruates every 24-28 days. She reports taking a home UPT last night and this morning, both of which were negative. She is currently on day 22 of her menstrual cycle  Pt reports she is concerned because she has a family history of breast ca--6 paternal aunts with breast ca.      Past Medical History:   Diagnosis Date    Anxiety     Asthma 2022    Depression     Lupus (HCC)     Miscarriage     Pilonidal cyst with abscess     Seasonal allergies     Varicella     Wears glasses        Past Surgical History:   Procedure Laterality Date    CARDIAC CATHETERIZATION  2022    Procedure: Cardiac catheterization;  Surgeon: Jethro Hood MD;  Location: [facility];  Service: Cardiology    CARDIAC CATHETERIZATION N/A 2022    Procedure: Cardiac Coronary Angiogram;  Surgeon: Jethro Hood MD;  Location: [facility];  Service: Cardiology    CHOLECYSTECTOMY  2010    Laparoscopic    COLONOSCOPY N/A 2018    Procedure: COLONOSCOPY;  Surgeon: Zaid Dunlap MD;  Location: [facility];  Service: Gastroenterology    MD EXCISION PILONIDAL CYST/SINUS SIMPLE N/A 2016    Procedure: EXCISION PILONIDAL CYST;  Surgeon: Neena Levine MD;  Location: [facility];  Service: General    MD LAPAROSCOPY W/RMVL ADNEXAL STRUCTURES Bilateral 10/27/2016    Procedure: SALPINGECTOMY;   Surgeon: Elly Brothers MD;  Location: [facility] OR;  Service: Gynecology    SALPINGECTOMY      WISDOM TOOTH EXTRACTION  2016    right upper only       OB History    Para Term  AB Living   3 2 2   1 2   SAB IAB Ectopic Multiple Live Births   1       2      # Outcome Date GA Lbr Juarez/2nd Weight Sex Type Anes PTL Lv   3 Term 14 39w0d  3232 g (7 lb 2 oz) M Vag-Spont      2 Term 12 38w0d  3515 g (7 lb 12 oz) M Vag-Spont      1 SAB 10/2011              Obstetric Comments   Second child is special needs             Current Outpatient Medications:     albuterol (PROVENTIL HFA,VENTOLIN HFA) 90 mcg/act inhaler, Inhale 2 puffs every 4 (four) hours as needed for wheezing, Disp: 18 g, Rfl: 4    Cholecalciferol (Vitamin D-3) 125 MCG (5000 UT) TABS, Take by mouth, Disp: , Rfl:     citalopram (CeleXA) 40 mg tablet, , Disp: , Rfl:     folic acid (FOLVITE) 1 mg tablet, Take 1 tablet (1 mg total) by mouth daily, Disp: 90 tablet, Rfl: 1    hydroxychloroquine (PLAQUENIL) 200 mg tablet, Take 1.5 tablets (300 mg total) by mouth in the morning, Disp: 135 tablet, Rfl: 1    methotrexate 2.5 MG tablet, 5 tablet po weekly (take all 5 tablets on the same day every week), Disp: 75 tablet, Rfl: 1    Multiple Vitamin (MULTIVITAMIN) tablet, Take 1 tablet by mouth daily, Disp: , Rfl:     norethindrone (Ortho Micronor) 0.35 MG tablet, Take 1 tablet (0.35 mg total) by mouth daily, Disp: 84 tablet, Rfl: 1    omeprazole (PriLOSEC) 20 mg delayed release capsule, Take 1 capsule (20 mg total) by mouth daily, Disp: 90 capsule, Rfl: 0    Spiriva Respimat 1.25 MCG/ACT AERS inhaler, INHALE TWO PUFFS BY MOUTH DAILY, Disp: 4 g, Rfl: 0    Symbicort 160-4.5 MCG/ACT inhaler, INHALE TWO PUFFS BY MOUTH TWICE DAILY. RINSE MOUTH AFTER USE., Disp: 10.2 g, Rfl: 11    ARIPiprazole (ABILIFY) 5 mg tablet, Take 1 tablet (5 mg total) by mouth daily at bedtime (Patient not taking: Reported on 2024), Disp: 30 tablet, Rfl: 1     Butalbital-APAP-Caffeine (Fioricet) -40 MG CAPS, Take 1 capsule by mouth 4 (four) times a day as needed (headache) (Patient not taking: Reported on 5/29/2024), Disp: 20 capsule, Rfl: 0    EPINEPHrine (EPIPEN) 0.3 mg/0.3 mL SOAJ, Inject 0.3 mL (0.3 mg total) into a muscle once for 1 dose (Patient not taking: Reported on 6/25/2024), Disp: 0.6 mL, Rfl: 0    hydrOXYzine HCL (ATARAX) 25 mg tablet, 1-3 a day as needed (Patient not taking: Reported on 1/29/2024), Disp: , Rfl:     ketoconazole (NIZORAL) 2 % cream, Apply topically daily To neck (Patient not taking: Reported on 9/15/2023), Disp: 30 g, Rfl: 0    ketorolac (TORADOL) 10 mg tablet, Take 1 tablet (10 mg total) by mouth every 6 (six) hours as needed for moderate pain for up to 5 days (Patient not taking: Reported on 8/5/2024), Disp: 20 tablet, Rfl: 2    methylPREDNISolone 4 MG tablet therapy pack, Use as directed on package (Patient not taking: Reported on 5/6/2024), Disp: 21 each, Rfl: 0    montelukast (SINGULAIR) 10 mg tablet, TAKE 1 TABLET BY MOUTH DAILY AT BEDTIME (Patient not taking: Reported on 7/12/2024), Disp: 30 tablet, Rfl: 0    naproxen (Naprosyn) 500 mg tablet, Take 1 tablet (500 mg total) by mouth 2 (two) times a day with meals As needed for joint pain (Patient not taking: Reported on 7/12/2024), Disp: 180 tablet, Rfl: 1    predniSONE 5 mg tablet, 4 tabs x7 days, then 3 tabs x7 days, then 2 tabs x7 days, then 1 tab x7 days, then stop. (Patient not taking: Reported on 3/19/2024), Disp: 70 tablet, Rfl: 0    Allergies   Allergen Reactions    Gluten Meal - Food Allergy        Social History     Socioeconomic History    Marital status: /Civil Union     Spouse name: Not on file    Number of children: 2    Years of education: Not on file    Highest education level: Not on file   Occupational History    Occupation: homemaker   Tobacco Use    Smoking status: Never    Smokeless tobacco: Never   Vaping Use    Vaping status: Never Used   Substance and  Sexual Activity    Alcohol use: Never    Drug use: Never    Sexual activity: Yes     Partners: Male     Birth control/protection: Female Sterilization   Other Topics Concern    Not on file   Social History Narrative        [employer] affiliation     Social Determinants of Health     Financial Resource Strain: Low Risk  (4/2/2024)    [facility] from [facility], [facility]    Overall Financial Resource Strain (CARDIA)     Difficulty of Paying Living Expenses: Not hard at all   Food Insecurity: No Food Insecurity (4/2/2024)    Received from [facility], [facility]    Hunger Vital Sign     Worried About Running Out of Food in the Last Year: Never true     Ran Out of Food in the Last Year: Never true   Transportation Needs: No Transportation Needs (4/2/2024)    Received from [facility], [facility]    PRAPARE - Transportation     Lack of Transportation (Medical): No     Lack of Transportation (Non-Medical): No   Physical Activity: Not on file   Stress: Not on file   Social Connections: Not on file   Intimate Partner Violence: Not At Risk (4/2/2024)    Received from [facility], [facility]    Humiliation, Afraid, Rape, and Kick questionnaire     Fear of Current or Ex-Partner: No     Emotionally Abused: No     Physically Abused: No     Sexually Abused: No   Housing Stability: Low Risk  (4/2/2024)    Received from [facility], [facility]    Housing Stability Vital Sign     Unable to Pay for Housing in the Last Year: No     Number of Places Lived in the Last Year: 1     Unstable Housing in the Last Year: No       Family History   Problem Relation Age of Onset    Seizures Mother     Other Mother         Epilepsy    Mental illness Mother     Dementia Mother     Schizophrenia Mother     Suicide Attempts Mother     Dysrhythmia Father     Hypertension  "Father     Stroke Father     Other Father         Cardiac disorder, cardiac pacemaker     Heart disease Father     Coronary artery disease Paternal Grandfather     Cancer Family     Heart disease Brother        Review of Systems   Constitutional:  Negative for chills, fatigue, fever and unexpected weight change.   HENT:  Negative for congestion, mouth sores and sore throat.    Respiratory:  Negative for cough, chest tightness, shortness of breath and wheezing.    Cardiovascular:  Negative for chest pain and palpitations.   Gastrointestinal:  Negative for abdominal distention, abdominal pain, constipation, diarrhea, nausea and vomiting.   Endocrine: Negative for cold intolerance and heat intolerance.   Genitourinary:  Negative for dyspareunia, dysuria, genital sores, menstrual problem, pelvic pain, vaginal bleeding, vaginal discharge and vaginal pain.   Musculoskeletal:  Negative for arthralgias.   Skin:  Negative for color change and rash.   Neurological:  Negative for dizziness, light-headedness and headaches.   Hematological:  Negative for adenopathy.       Blood pressure 106/70, height 5' 5\" (1.651 m), weight 68.9 kg (152 lb), last menstrual period 07/15/2024, not currently breastfeeding. and Body mass index is 25.29 kg/m².    Physical Exam  Constitutional:       Appearance: Normal appearance. She is normal weight.   HENT:      Head: Normocephalic and atraumatic.   Eyes:      Extraocular Movements: Extraocular movements intact.      Conjunctiva/sclera: Conjunctivae normal.   Pulmonary:      Effort: Pulmonary effort is normal.   Musculoskeletal:         General: Normal range of motion.      Cervical back: Normal range of motion.   Skin:     General: Skin is warm.      Findings: No erythema or rash.   Neurological:      Mental Status: She is alert and oriented to person, place, and time.   Psychiatric:         Mood and Affect: Mood normal.         Behavior: Behavior normal.         Thought Content: Thought content " normal.         Judgment: Judgment normal.         Breasts: breasts appear normal, no suspicious masses, no skin or nipple changes or axillary nodes. Tenderness noted bilaterally, L>R      A/P:  Pt is a 34 y.o.  with      Laura was seen today for breast problem.    Diagnoses and all orders for this visit:    Mastalgia  -exam benign  -likely due to up coming menses. Pt will contact me if symptoms do not resolve and we will seek breast imaging.    Family history of breast cancer  -pt advised to have her father undergo BRCA testing since all of her aunts have breast ca. If he is negative, she does not need to undergo testing.

## 2024-08-12 DIAGNOSIS — Z84.81 FAMILY HISTORY OF BRCA GENE POSITIVE: Primary | ICD-10-CM

## 2024-08-23 ENCOUNTER — TELEPHONE (OUTPATIENT)
Age: 34
End: 2024-08-23

## 2024-08-23 DIAGNOSIS — E53.8 VITAMIN B12 DEFICIENCY: Primary | ICD-10-CM

## 2024-08-23 RX ORDER — CYANOCOBALAMIN 1000 UG/ML
1000 INJECTION, SOLUTION INTRAMUSCULAR; SUBCUTANEOUS
Status: DISCONTINUED | OUTPATIENT
Start: 2024-08-23 | End: 2024-08-23

## 2024-08-23 RX ORDER — CYANOCOBALAMIN 1000 UG/ML
1000 INJECTION, SOLUTION INTRAMUSCULAR; SUBCUTANEOUS
Status: SHIPPED | OUTPATIENT
Start: 2024-08-26 | End: 2024-10-25

## 2024-08-26 ENCOUNTER — CLINICAL SUPPORT (OUTPATIENT)
Dept: FAMILY MEDICINE CLINIC | Facility: CLINIC | Age: 34
End: 2024-08-26
Payer: COMMERCIAL

## 2024-08-26 DIAGNOSIS — M32.9 LUPUS (HCC): ICD-10-CM

## 2024-08-26 DIAGNOSIS — Z79.899 HIGH RISK MEDICATION USE: Primary | ICD-10-CM

## 2024-08-26 DIAGNOSIS — E53.8 VITAMIN B12 DEFICIENCY: Primary | ICD-10-CM

## 2024-08-26 PROCEDURE — 96372 THER/PROPH/DIAG INJ SC/IM: CPT

## 2024-08-26 RX ORDER — METHOTREXATE 2.5 MG/1
TABLET ORAL
Qty: 90 TABLET | Refills: 1 | Status: SHIPPED | OUTPATIENT
Start: 2024-08-26

## 2024-08-26 RX ORDER — PREDNISONE 5 MG/1
TABLET ORAL
Qty: 70 TABLET | Refills: 0 | Status: SHIPPED | OUTPATIENT
Start: 2024-08-26

## 2024-08-26 RX ADMIN — CYANOCOBALAMIN 1000 MCG: 1000 INJECTION, SOLUTION INTRAMUSCULAR; SUBCUTANEOUS at 09:35

## 2024-08-29 ENCOUNTER — TELEPHONE (OUTPATIENT)
Dept: GENETICS | Facility: CLINIC | Age: 34
End: 2024-08-29

## 2024-08-29 NOTE — TELEPHONE ENCOUNTER
I called and spoke to the patient to move up a future appointment to 10/3/2024 10:30 AM with Meghan.

## 2024-09-12 ENCOUNTER — OFFICE VISIT (OUTPATIENT)
Dept: URGENT CARE | Facility: MEDICAL CENTER | Age: 34
End: 2024-09-12
Payer: COMMERCIAL

## 2024-09-12 VITALS
WEIGHT: 156 LBS | RESPIRATION RATE: 20 BRPM | DIASTOLIC BLOOD PRESSURE: 81 MMHG | HEIGHT: 65 IN | BODY MASS INDEX: 25.99 KG/M2 | HEART RATE: 115 BPM | SYSTOLIC BLOOD PRESSURE: 134 MMHG | TEMPERATURE: 98 F | OXYGEN SATURATION: 99 %

## 2024-09-12 DIAGNOSIS — J02.9 SORE THROAT: Primary | ICD-10-CM

## 2024-09-12 DIAGNOSIS — J01.00 ACUTE MAXILLARY SINUSITIS, RECURRENCE NOT SPECIFIED: ICD-10-CM

## 2024-09-12 LAB
S PYO AG THROAT QL: NEGATIVE
VALID CONTROL: NORMAL

## 2024-09-12 PROCEDURE — 87070 CULTURE OTHR SPECIMN AEROBIC: CPT | Performed by: FAMILY MEDICINE

## 2024-09-12 PROCEDURE — 87880 STREP A ASSAY W/OPTIC: CPT | Performed by: FAMILY MEDICINE

## 2024-09-12 PROCEDURE — 99213 OFFICE O/P EST LOW 20 MIN: CPT | Performed by: FAMILY MEDICINE

## 2024-09-12 RX ORDER — AMOXICILLIN 875 MG
875 TABLET ORAL 2 TIMES DAILY
Qty: 20 TABLET | Refills: 0 | Status: SHIPPED | OUTPATIENT
Start: 2024-09-12 | End: 2024-09-20

## 2024-09-12 NOTE — PATIENT INSTRUCTIONS
Rapid strep test negative throat culture sent.  Patient started on amoxicillin I recommended patient start DayQuil NyQuil for nasal congestion and cough.  Also encourage patient to increase fluid intake, gargle with salt water perform nasal rinse with saline.  Patient Education     Sinusitis, Adult ED   General Information   You came to the Emergency Department (ED) for sinusitis. Your sinuses are hollow areas in the bones of your face. They have a thin lining that normally makes a small amount of mucus. When you have sinusitis, the lining gets swollen and makes extra mucus. You may have sinusitis with or after a cold. Most of the time sinusitis will get better in 1 to 2 weeks.  Sinusitis is most often caused by a virus, so antibiotics won’t help. But some people do need antibiotics. If the doctor ordered antibiotics for you, be sure to follow the instructions. It is important to take all of your antibiotics even if you start to feel better.  What care is needed at home?   Call your regular doctor to let them know you were in the ED. Make a follow-up appointment if you were told to.  Try to thin the mucus.  Drink lots of liquids to stay hydrated.  Use a cool mist humidifier to avoid dry air.  Use saline nose drops or a saline nose rinse to relieve stuffiness.  Wash your hands often. This will help keep others healthy.  Do not smoke or be in smoke-filled places. Avoid things that may cause breathing problems like fumes, pollution, dust, and other common allergens and irritants.  You may want to take medicine like ibuprofen, naproxen, or acetaminophen to help with pain.  When do I need to get emergency help?   Return to the ED if:   You have a stiff neck, especially if you also have fever, chills, vomiting, or severe headache  You have trouble thinking clearly.  You have trouble seeing or have double vision.  You have swelling or redness or pain around one or both eyes.  You have a fever of 102°F (38.9°C) or higher,  or have shaking chills or sweats.  When do I need to call the doctor?   You have an upset stomach and throwing up.  You have more pain in your face and head.  You are not getting better within 1 to 2 weeks.  You have new or worsening symptoms.  Last Reviewed Date   2020-08-03  Consumer Information Use and Disclaimer   This generalized information is a limited summary of diagnosis, treatment, and/or medication information. It is not meant to be comprehensive and should be used as a tool to help the user understand and/or assess potential diagnostic and treatment options. It does NOT include all information about conditions, treatments, medications, side effects, or risks that may apply to a specific patient. It is not intended to be medical advice or a substitute for the medical advice, diagnosis, or treatment of a health care provider based on the health care provider's examination and assessment of a patient’s specific and unique circumstances. Patients must speak with a health care provider for complete information about their health, medical questions, and treatment options, including any risks or benefits regarding use of medications. This information does not endorse any treatments or medications as safe, effective, or approved for treating a specific patient. [employer] and its affiliates disclaim any warranty or liability relating to this information or the use thereof. The use of this information is governed by the Terms of Use, available at https://www.woltersCompleteCar.comuwer.com/en/know/clinical-effectiveness-terms   Copyright   Copyright © 2024 [employer] and its affiliates and/or licensors. All rights reserved.

## 2024-09-12 NOTE — PROGRESS NOTES
[facility]        NAME: Laura Christiansen is a 34 y.o. female  : 1990    MRN: [id number]  DATE: 2024  TIME: 10:47 AM    Assessment and Plan   Sore throat [J02.9]  1. Sore throat  Throat culture    POCT rapid strepA    Throat culture      2. Acute maxillary sinusitis, recurrence not specified  amoxicillin (AMOXIL) 875 mg tablet            Patient Instructions       Follow up with PCP in 3-5 days.  Proceed to  ER if symptoms worsen.    If tests have been performed at [facility], our office will contact you with results if changes need to be made to the care plan discussed with you at the visit.  [facility]    Chief Complaint     Chief Complaint   Patient presents with    Cold Like Symptoms     Patient complains of runny nose, sinus pressure, R ear pain, cough, post nasal drip, and sore throat x 2 days. States she took a covid test yesterday that was negative. Also has been taking tylenol as needed to keep fever down.         History of Present Illness       34-year-old female here today with complaint of nasal congestion sinus pain pressure over the last 2 days.  Also complaining of right ear radiating nonproductive cough postnasal drip.  Sore throat (nature.  She is trying cough drops with some mild improvement also taking Tylenol for low-grade fever as high as 101 Fahrenheit.  Denies any body aches.  2 children had similar symptoms last week.  Denies any flulike symptoms.        Review of Systems   Review of Systems   Constitutional: Negative.    HENT:  Positive for congestion, sinus pressure, sinus pain and sore throat.    Respiratory:  Positive for cough.    Neurological:  Positive for headaches.         Current Medications       Current Outpatient Medications:     amoxicillin (AMOXIL) 875 mg tablet, Take 1 tablet (875 mg total) by mouth 2 (two) times a day for 10 days, Disp: 20 tablet, Rfl: 0    albuterol (PROVENTIL HFA,VENTOLIN HFA) 90  mcg/act inhaler, Inhale 2 puffs every 4 (four) hours as needed for wheezing, Disp: 18 g, Rfl: 4    ARIPiprazole (ABILIFY) 5 mg tablet, Take 1 tablet (5 mg total) by mouth daily at bedtime (Patient not taking: Reported on 5/6/2024), Disp: 30 tablet, Rfl: 1    Butalbital-APAP-Caffeine (Fioricet) -40 MG CAPS, Take 1 capsule by mouth 4 (four) times a day as needed (headache) (Patient not taking: Reported on 5/29/2024), Disp: 20 capsule, Rfl: 0    Cholecalciferol (Vitamin D-3) 125 MCG (5000 UT) TABS, Take by mouth, Disp: , Rfl:     citalopram (CeleXA) 40 mg tablet, , Disp: , Rfl:     EPINEPHrine (EPIPEN) 0.3 mg/0.3 mL SOAJ, Inject 0.3 mL (0.3 mg total) into a muscle once for 1 dose (Patient not taking: Reported on 6/25/2024), Disp: 0.6 mL, Rfl: 0    folic acid (FOLVITE) 1 mg tablet, Take 1 tablet (1 mg total) by mouth daily, Disp: 90 tablet, Rfl: 1    hydroxychloroquine (PLAQUENIL) 200 mg tablet, Take 1.5 tablets (300 mg total) by mouth in the morning, Disp: 135 tablet, Rfl: 1    hydrOXYzine HCL (ATARAX) 25 mg tablet, 1-3 a day as needed (Patient not taking: Reported on 1/29/2024), Disp: , Rfl:     ketoconazole (NIZORAL) 2 % cream, Apply topically daily To neck (Patient not taking: Reported on 9/15/2023), Disp: 30 g, Rfl: 0    ketorolac (TORADOL) 10 mg tablet, Take 1 tablet (10 mg total) by mouth every 6 (six) hours as needed for moderate pain for up to 5 days (Patient not taking: Reported on 8/5/2024), Disp: 20 tablet, Rfl: 2    methotrexate 2.5 MG tablet, 6 tablet po weekly (take all 6 tablets on the same day every week), Disp: 90 tablet, Rfl: 1    methylPREDNISolone 4 MG tablet therapy pack, Use as directed on package (Patient not taking: Reported on 5/6/2024), Disp: 21 each, Rfl: 0    montelukast (SINGULAIR) 10 mg tablet, TAKE 1 TABLET BY MOUTH DAILY AT BEDTIME (Patient not taking: Reported on 7/12/2024), Disp: 30 tablet, Rfl: 0    Multiple Vitamin (MULTIVITAMIN) tablet, Take 1 tablet by mouth daily, Disp: ,  Rfl:     naproxen (Naprosyn) 500 mg tablet, Take 1 tablet (500 mg total) by mouth 2 (two) times a day with meals As needed for joint pain (Patient not taking: Reported on 7/12/2024), Disp: 180 tablet, Rfl: 1    norethindrone (Ortho Micronor) 0.35 MG tablet, Take 1 tablet (0.35 mg total) by mouth daily, Disp: 84 tablet, Rfl: 1    omeprazole (PriLOSEC) 20 mg delayed release capsule, Take 1 capsule (20 mg total) by mouth daily, Disp: 90 capsule, Rfl: 0    predniSONE 5 mg tablet, 4 tabs x7 days, then 3 tabs x7 days, then 2 tabs x7 days, then 1 tab x7 days, then stop., Disp: 70 tablet, Rfl: 0    Spiriva Respimat 1.25 MCG/ACT AERS inhaler, INHALE TWO PUFFS BY MOUTH DAILY, Disp: 4 g, Rfl: 0    Symbicort 160-4.5 MCG/ACT inhaler, INHALE TWO PUFFS BY MOUTH TWICE DAILY. RINSE MOUTH AFTER USE., Disp: 10.2 g, Rfl: 11    Current Facility-Administered Medications:     cyanocobalamin injection 1,000 mcg, 1,000 mcg, Intramuscular, Q30 Days, , 1,000 mcg at 08/26/24 0935    Current Allergies     Allergies as of 09/12/2024 - Reviewed 09/12/2024   Allergen Reaction Noted    Gluten meal - food allergy  01/16/2019            The following portions of the patient's history were reviewed and updated as appropriate: allergies, current medications, past family history, past medical history, past social history, past surgical history and problem list.     Past Medical History:   Diagnosis Date    Anxiety     Asthma December 2022    Depression     Lupus (HCC) 2024    Miscarriage 2011    Pilonidal cyst with abscess     Seasonal allergies     Varicella     Wears glasses        Past Surgical History:   Procedure Laterality Date    CARDIAC CATHETERIZATION  8/29/2022    Procedure: Cardiac catheterization;  Surgeon: Jethro Hood MD;  Location: [facility];  Service: Cardiology    CARDIAC CATHETERIZATION N/A 8/29/2022    Procedure: Cardiac Coronary Angiogram;  Surgeon: Jethro Hood MD;  Location: [facility];  Service: Cardiology  "   CHOLECYSTECTOMY  08/2010    Laparoscopic    COLONOSCOPY N/A 5/9/2018    Procedure: COLONOSCOPY;  Surgeon: Zaid Dunlap MD;  Location: [facility];  Service: Gastroenterology    VA EXCISION PILONIDAL CYST/SINUS SIMPLE N/A 4/28/2016    Procedure: EXCISION PILONIDAL CYST;  Surgeon: Neena Levine MD;  Location: [facility];  Service: General    VA LAPAROSCOPY W/RMVL ADNEXAL STRUCTURES Bilateral 10/27/2016    Procedure: SALPINGECTOMY;  Surgeon: Elly Brothers MD;  Location: [facility];  Service: Gynecology    SALPINGECTOMY      WISDOM TOOTH EXTRACTION  01/2016    right upper only       Family History   Problem Relation Age of Onset    Seizures Mother     Other Mother         Epilepsy    Mental illness Mother     Dementia Mother     Schizophrenia Mother     Suicide Attempts Mother     Dysrhythmia Father     Hypertension Father     Stroke Father     Other Father         Cardiac disorder, cardiac pacemaker     Heart disease Father     Coronary artery disease Paternal Grandfather     Cancer Family     Heart disease Brother          Medications have been verified.        Objective   /81   Pulse (!) 115   Temp 98 °F (36.7 °C)   Resp 20   Ht 5' 5\" (1.651 m)   Wt 70.8 kg (156 lb)   SpO2 99%   BMI 25.96 kg/m²   No LMP recorded.       Physical Exam     Physical Exam  Vitals and nursing note reviewed.   Constitutional:       Appearance: She is ill-appearing.   HENT:      Head:      Comments: Maxillary sinus tenderness     Right Ear: Tympanic membrane normal.      Left Ear: Tympanic membrane normal.      Nose:      Comments: Hypertrophic turbinates left greater than right     Mouth/Throat:      Comments: Cobblestoning observed in the posterior pharynx  Pulmonary:      Effort: Pulmonary effort is normal.      Breath sounds: Normal breath sounds.   Musculoskeletal:      Cervical back: Normal range of motion.   Lymphadenopathy:      Cervical: No cervical adenopathy.                   "

## 2024-09-14 LAB — BACTERIA THROAT CULT: NORMAL

## 2024-09-15 DIAGNOSIS — M32.9 LUPUS: ICD-10-CM

## 2024-09-16 RX ORDER — HYDROXYCHLOROQUINE SULFATE 200 MG/1
300 TABLET, FILM COATED ORAL DAILY
Qty: 135 TABLET | Refills: 1 | Status: SHIPPED | OUTPATIENT
Start: 2024-09-16 | End: 2025-03-15

## 2024-09-16 RX ORDER — FOLIC ACID 1 MG/1
1 TABLET ORAL DAILY
Qty: 90 TABLET | Refills: 1 | Status: SHIPPED | OUTPATIENT
Start: 2024-09-16

## 2024-09-17 ENCOUNTER — TELEPHONE (OUTPATIENT)
Age: 34
End: 2024-09-17

## 2024-09-17 NOTE — TELEPHONE ENCOUNTER
Per Dr. Otto, he received a renewal for Fasenra but he does not believe she is on it. Called and LVM asking patient to call back and verify if she is on this medication or not. Please let me know if patient calls back.

## 2024-09-20 ENCOUNTER — OFFICE VISIT (OUTPATIENT)
Dept: PULMONOLOGY | Facility: CLINIC | Age: 34
End: 2024-09-20
Payer: COMMERCIAL

## 2024-09-20 VITALS
SYSTOLIC BLOOD PRESSURE: 128 MMHG | OXYGEN SATURATION: 98 % | HEART RATE: 105 BPM | DIASTOLIC BLOOD PRESSURE: 68 MMHG | TEMPERATURE: 98.8 F

## 2024-09-20 DIAGNOSIS — M32.9 LUPUS: ICD-10-CM

## 2024-09-20 DIAGNOSIS — J45.51 SEVERE PERSISTENT ASTHMA WITH EXACERBATION: Primary | ICD-10-CM

## 2024-09-20 PROBLEM — IMO0002 LUPUS: Status: ACTIVE | Noted: 2024-09-20

## 2024-09-20 PROCEDURE — 99214 OFFICE O/P EST MOD 30 MIN: CPT | Performed by: INTERNAL MEDICINE

## 2024-09-20 RX ORDER — TIOTROPIUM BROMIDE INHALATION SPRAY 1.56 UG/1
2 SPRAY, METERED RESPIRATORY (INHALATION) DAILY
Qty: 4 G | Refills: 11 | Status: SHIPPED | OUTPATIENT
Start: 2024-09-20

## 2024-09-20 RX ORDER — ALBUTEROL SULFATE 0.83 MG/ML
2.5 SOLUTION RESPIRATORY (INHALATION) EVERY 6 HOURS PRN
Qty: 90 ML | Refills: 3 | Status: SHIPPED | OUTPATIENT
Start: 2024-09-20 | End: 2024-10-20

## 2024-09-20 RX ORDER — ALBUTEROL SULFATE 90 UG/1
2 INHALANT RESPIRATORY (INHALATION) EVERY 4 HOURS PRN
Qty: 18 G | Refills: 4 | Status: SHIPPED | OUTPATIENT
Start: 2024-09-20

## 2024-09-20 RX ORDER — PREDNISONE 20 MG/1
40 TABLET ORAL DAILY
Qty: 10 TABLET | Refills: 0 | Status: SHIPPED | OUTPATIENT
Start: 2024-09-20 | End: 2024-09-25

## 2024-09-20 RX ORDER — BUDESONIDE AND FORMOTEROL FUMARATE DIHYDRATE 160; 4.5 UG/1; UG/1
2 AEROSOL RESPIRATORY (INHALATION) 2 TIMES DAILY
Qty: 10.2 G | Refills: 11 | Status: SHIPPED | OUTPATIENT
Start: 2024-09-20

## 2024-09-20 NOTE — ASSESSMENT & PLAN NOTE
Her asthma has been controlled in general for the last year since I added Spiriva Respimat to her Symbicort 160-4.5. She was rarely using albuterol  Now in the midst of a flare after a sinus infection.    Plan  Refill her Symbicort, Spiriva, PRN albuterol inhaler/nebs  For the next few days use albuterol every 4-6 hours as needed and monitor peak flow values.  Her current peak flow today is around 350, baseline 450 or so  Will order 5 days 40 mg prednisone. She will then resume her low dose methylprednisolone taper for SLE that her rheumatologist prescribed  Influenza vaccine this Fall after she recovers from this asthma flare    RTC 6 months or sooner if need be

## 2024-09-20 NOTE — TELEPHONE ENCOUNTER
Patient is returning Yareli's call about fasenra. I let her know that she will receive a call back as soon as possible.

## 2024-09-20 NOTE — ASSESSMENT & PLAN NOTE
She has been diagnosed with SLE late 2023. She is on methotrexate 15 mg weekly and hydroxychloroquine and is currently on a low-dose methylprednisolone taper and follows with rheumatology.

## 2024-09-20 NOTE — PROGRESS NOTES
Pulmonary Outpatient Note   Laura Christiansen 34 y.o. female MRN: [id number]  9/20/2024      Reason for Consultation:    Chief Complaint   Patient presents with    Asthma         Assessment/Plan:    1. Severe persistent asthma with exacerbation  Assessment & Plan:  Her asthma has been controlled in general for the last year since I added Spiriva Respimat to her Symbicort 160-4.5. She was rarely using albuterol  Now in the midst of a flare after a sinus infection.    Plan  Refill her Symbicort, Spiriva, PRN albuterol inhaler/nebs  For the next few days use albuterol every 4-6 hours as needed and monitor peak flow values.  Her current peak flow today is around 350, baseline 450 or so  Will order 5 days 40 mg prednisone. She will then resume her low dose methylprednisolone taper for SLE that her rheumatologist prescribed  Influenza vaccine this Fall after she recovers from this asthma flare    RTC 6 months or sooner if need be  Orders:  -     predniSONE 20 mg tablet; Take 2 tablets (40 mg total) by mouth daily for 5 days  -     budesonide-formoterol (Symbicort) 160-4.5 mcg/act inhaler; Inhale 2 puffs 2 (two) times a day Rinse mouth after use  -     albuterol (PROVENTIL HFA,VENTOLIN HFA) 90 mcg/act inhaler; Inhale 2 puffs every 4 (four) hours as needed for wheezing  -     albuterol (2.5 mg/3 mL) 0.083 % nebulizer solution; Take 3 mL (2.5 mg total) by nebulization every 6 (six) hours as needed for wheezing or shortness of breath  -     tiotropium (Spiriva Respimat) 1.25 MCG/ACT AERS inhaler; Inhale 2 puffs daily  2. Lupus (HCC)  Assessment & Plan:  She has been diagnosed with SLE late 2023. She is on methotrexate 15 mg weekly and hydroxychloroquine and is currently on a low-dose methylprednisolone taper and follows with rheumatology.          Health Maintenance  Immunization History   Administered Date(s) Administered    COVID-19 PFIZER VACCINE 0.3 ML IM 04/26/2021, 05/17/2021    COVID-19 Pfizer vac (Chilo-sucrose, gray cap)  12 yr+ IM 01/14/2022    INFLUENZA 11/27/2017    Influenza Quadrivalent, 6-35 Months IM 11/27/2017    Influenza, injectable, quadrivalent, preservative free 0.5 mL 11/06/2019, 11/16/2021    Influenza, recombinant, quadrivalent,injectable, preservative free 01/13/2021, 12/05/2022    Tdap 11/16/2021          Return in about 6 months (around 3/20/2025).          History of Present Illness   HPI:  Laura Christiansen is a 34 y.o. female who has asthma, anxiety, PTSD, SLE, depression who follows-up for asthma.    Last seen 7/2023  Maintained on high dose Symbicort, Spiriva, singulair with PRN albuterol at that time.    Interim   Had been doing quite well and had not needed albuterol for months.  On Symbicort 160-4.5 and Spiriva respimat.  Has been in [facility] multiple times in the past year- had complex migraine in January,  Ovarian Cyst rupture in Spring.  Another migraine in the hospital  For the last 10 days has had wheezing, coughing, chest congestion- has required albuterol 4-5 x in the last week. Had sinus infection diagnosed earlier September and took an antibiotic.  Also with some shortness of breath.    She is in the midst of a methylprednisoline burst for a lupus flare.  She is on methotrexate and hydroxychloroquine.  SLE was diagnosed 12/2023 since our last visit.    Has a peak flow meter- normal 425-450. Currently 350 in the midst of this flare    No allergy issues  No heartburn      Review of Systems   Constitutional:  Negative for chills and fever.   HENT:  Negative for ear pain and sore throat.    Eyes:  Negative for pain and visual disturbance.   Respiratory:  Positive for cough, chest tightness, shortness of breath and wheezing.    Cardiovascular:  Negative for chest pain and palpitations.   Gastrointestinal:  Negative for abdominal pain and vomiting.   Genitourinary:  Negative for dysuria and hematuria.   Musculoskeletal:  Negative for arthralgias and back pain.   Skin:  Negative for color change and rash.    Neurological:  Negative for seizures and syncope.   All other systems reviewed and are negative.          Historical Information   Past Medical History:   Diagnosis Date    Anxiety     Asthma December 2022    Depression     Lupus (HCC) 2024    Miscarriage 2011    Pilonidal cyst with abscess     Seasonal allergies     Varicella     Wears glasses      Past Surgical History:   Procedure Laterality Date    CARDIAC CATHETERIZATION  8/29/2022    Procedure: Cardiac catheterization;  Surgeon: Jethro Hood MD;  Location: [facility];  Service: Cardiology    CARDIAC CATHETERIZATION N/A 8/29/2022    Procedure: Cardiac Coronary Angiogram;  Surgeon: Jethro Hood MD;  Location: [facility];  Service: Cardiology    CHOLECYSTECTOMY  08/2010    Laparoscopic    COLONOSCOPY N/A 5/9/2018    Procedure: COLONOSCOPY;  Surgeon: Zaid Dunlap MD;  Location: [facility] [place] [facility];  Service: Gastroenterology    NV EXCISION PILONIDAL CYST/SINUS SIMPLE N/A 4/28/2016    Procedure: EXCISION PILONIDAL CYST;  Surgeon: Neena Levine MD;  Location: [facility];  Service: General    NV LAPAROSCOPY W/RMVL ADNEXAL STRUCTURES Bilateral 10/27/2016    Procedure: SALPINGECTOMY;  Surgeon: Elly Brothers MD;  Location: [facility];  Service: Gynecology    SALPINGECTOMY      WISDOM TOOTH EXTRACTION  01/2016    right upper only     Family History   Problem Relation Age of Onset    Seizures Mother     Other Mother         Epilepsy    Mental illness Mother     Dementia Mother     Schizophrenia Mother     Suicide Attempts Mother     Dysrhythmia Father     Hypertension Father     Stroke Father     Other Father         Cardiac disorder, cardiac pacemaker     Heart disease Father     Coronary artery disease Paternal Grandfather     Cancer Family     Heart disease Brother              Meds/Allergies     Current Outpatient Medications:     albuterol (2.5 mg/3 mL) 0.083 % nebulizer solution, Take 3 mL (2.5 mg total) by nebulization every 6 (six) hours as  needed for wheezing or shortness of breath, Disp: 90 mL, Rfl: 3    albuterol (PROVENTIL HFA,VENTOLIN HFA) 90 mcg/act inhaler, Inhale 2 puffs every 4 (four) hours as needed for wheezing, Disp: 18 g, Rfl: 4    budesonide-formoterol (Symbicort) 160-4.5 mcg/act inhaler, Inhale 2 puffs 2 (two) times a day Rinse mouth after use, Disp: 10.2 g, Rfl: 11    Cholecalciferol (Vitamin D-3) 125 MCG (5000 UT) TABS, Take by mouth, Disp: , Rfl:     citalopram (CeleXA) 40 mg tablet, , Disp: , Rfl:     folic acid (FOLVITE) 1 mg tablet, Take 1 tablet (1 mg total) by mouth daily, Disp: 90 tablet, Rfl: 1    hydroxychloroquine (PLAQUENIL) 200 mg tablet, Take 1.5 tablets (300 mg total) by mouth in the morning, Disp: 135 tablet, Rfl: 1    methotrexate 2.5 MG tablet, 6 tablet po weekly (take all 6 tablets on the same day every week), Disp: 90 tablet, Rfl: 1    Multiple Vitamin (MULTIVITAMIN) tablet, Take 1 tablet by mouth daily, Disp: , Rfl:     norethindrone (Ortho Micronor) 0.35 MG tablet, Take 1 tablet (0.35 mg total) by mouth daily, Disp: 84 tablet, Rfl: 1    omeprazole (PriLOSEC) 20 mg delayed release capsule, Take 1 capsule (20 mg total) by mouth daily, Disp: 90 capsule, Rfl: 0    predniSONE 20 mg tablet, Take 2 tablets (40 mg total) by mouth daily for 5 days, Disp: 10 tablet, Rfl: 0    tiotropium (Spiriva Respimat) 1.25 MCG/ACT AERS inhaler, Inhale 2 puffs daily, Disp: 4 g, Rfl: 11    Current Facility-Administered Medications:     cyanocobalamin injection 1,000 mcg, 1,000 mcg, Intramuscular, Q30 Days, , 1,000 mcg at 08/26/24 0935  Allergies   Allergen Reactions    Gluten Meal - Food Allergy        Physical Exam  Vitals and nursing note reviewed.   Constitutional:       General: She is not in acute distress.     Appearance: She is well-developed.   HENT:      Head: Normocephalic and atraumatic.      Nose: Nose normal. No congestion.      Mouth/Throat:      Mouth: Mucous membranes are moist.      Pharynx: No oropharyngeal exudate.  "  Eyes:      Conjunctiva/sclera: Conjunctivae normal.   Cardiovascular:      Rate and Rhythm: Normal rate and regular rhythm.      Heart sounds: No murmur heard.  Pulmonary:      Effort: Pulmonary effort is normal. No respiratory distress.      Breath sounds: Wheezing present.   Abdominal:      Palpations: Abdomen is soft.      Tenderness: There is no abdominal tenderness.   Musculoskeletal:         General: No swelling.      Cervical back: Neck supple.      Right lower leg: No edema.      Left lower leg: No edema.   Skin:     General: Skin is warm and dry.      Capillary Refill: Capillary refill takes less than 2 seconds.   Neurological:      Mental Status: She is alert.   Psychiatric:         Mood and Affect: Mood normal.         Labs:   I have personally reviewed pertinent lab results.    ABG:   Lab Results   Component Value Date    SOURCE None given 08/17/2022   ,   BNP: No results found for: \"BNP\",   CBC:  Lab Results   Component Value Date    WBC 4.57 06/10/2024    HGB 15.0 06/10/2024    HCT 44.7 06/10/2024    MCV 95 06/10/2024     06/10/2024    EOSPCT 5 06/10/2024    EOSABS 0.21 06/10/2024    NEUTOPHILPCT 49 06/10/2024    LYMPHOPCT 35 06/10/2024   ,   CMP:   Lab Results   Component Value Date    SODIUM 136 06/29/2024    K 5.1 06/29/2024     06/29/2024    CO2 26 06/29/2024    BUN 13 06/29/2024    CREATININE 0.70 06/29/2024    CALCIUM 9.6 06/29/2024    AST 30 06/29/2024    ALT 14 06/29/2024    ALKPHOS 30 (L) 06/29/2024    PROT 7.4 01/20/2018    BILITOT 0.7 01/20/2018    EGFR 116 06/29/2024   ,   PT/INR: No results found for: \"PT\", \"INR\",   Troponin: No results found for: \"TROPONINI\"    Component      Latest Ref Rng & Units 2/28/2023   ALTERNARIA ALTERNATA      0.00 - 0.09 kUA/I <0.10   ASPERGILLUS FUMIGATUS      0.00 - 0.09 kUA/I <0.10   BERMUDA GRASS      0.00 - 0.09 kUA/I <0.10   ALLERGEN MAPLE/BOX ELDER      0.00 - 0.09 kUA/I <0.10   ALLERGEN CAT EPITHELIUM-DANDER      0.00 - 0.09 kUA/I <0.10 "   CLADOSPORIUM HERBARUM      0.00 - 0.09 kUA/I <0.10   COCKROACH      0.00 - 0.09 kUA/I <0.10   ALLERGEN, COMMON SILVER BIRCH      0.00 - 0.09 kUA/I <0.10   ALLERGEN, COTTONWOOD      0.00 - 0.09 kUA/I <0.10   D. FARINAE      0.00 - 0.09 kUA/I <0.10   D. PTERONYSSINUS      0.00 - 0.09 kUA/I <0.10   DOG DANDER      0.00 - 0.09 kUA/I <0.10   ALLERGEN ELM      0.00 - 0.09 kUA/I <0.10   M[place] TREE      0.00 - 0.09 kUA/I <0.10   ALLERGEN MUGWORT IGE      0.00 - 0.09 kUA/I <0.10   MULBERRY TREE      0.00 - 0.09 kUA/I <0.10   ALLERGEN, OAK      0.00 - 0.09 kUA/I <0.10   PENICILLIUM CHRYSOGENUM      0.00 - 0.09 kUA/I <0.10   ALLERGEN ROUGH PIGWEED (W14) IGE      0.00 - 0.09 kUA/I <0.10   COMMON RAGWEED      0.00 - 0.09 kUA/I <0.10   ALLERGEN SHEEP SORREL (W18) IGE      0.00 - 0.09 kUA/I <0.10   [place] TREE      0.00 - 0.09 kUA/I <0.10   DECLAN GRASS      0.00 - 0.09 kUA/I <0.10   [place] TREE      0.00 - 0.09 kUA/I <0.10   WHITE AUREA TREE      0.00 - 0.09 kUA/I <0.10   TOTAL IGE      0 - 113 kU/l 4.14   MOUSE URINE      0.00 - 0.09 kUA/I <0.10       Imaging and other studies: I have personally reviewed pertinent reports.   and I have personally reviewed pertinent films in PACS    CXR 6/6/23  Lungs clear    CXR 12/3/22  Lungs clear    CXR 1/25/23 at [facility] report only  Lungs clear      Pulmonary function testing:   Spirometry:  FEV1/FVC Ratio is 34 %. FEV1 is 1.15 L/32 % of predicted. FVC is 3.35 L/79 % of predicted.  There is significant bronchodilator response     Lung volumes:  RV 1.18 L/74 % of predicted, TLC 4.70 L/84 % of predicted, RV/TLC ratio 25 %     Diffusing capacity:  93 % of predicted     IMPRESSION:  Moderate obstructive air flow limitation with normal vital capacity based on post bronchodilator spirometry  Significant bronchodilator response  Normal lung volumes  Normal diffusion capacity  Abnormal flow volume lobe consistent with obstruction     EKG, Pathology, and Other Studies: I have personally  reviewed pertinent reports.     Theo Otto M.D.  [facility]

## 2024-09-20 NOTE — TELEPHONE ENCOUNTER
Called patient back. She stated it was discussed with Dr. Otto to potentially start Fasenra a year ago but she tried Spiriva first which has been working for her. She did state that she is having an asthma exacerbation right now and would like to make an appt with Dr. Otto. Scheduled her a sick visit for today at [place] 3pm. Provided office address.

## 2024-09-25 ENCOUNTER — OFFICE VISIT (OUTPATIENT)
Dept: RHEUMATOLOGY | Facility: CLINIC | Age: 34
End: 2024-09-25
Payer: COMMERCIAL

## 2024-09-25 ENCOUNTER — APPOINTMENT (OUTPATIENT)
Dept: LAB | Facility: CLINIC | Age: 34
End: 2024-09-25
Payer: COMMERCIAL

## 2024-09-25 VITALS
WEIGHT: 156 LBS | BODY MASS INDEX: 25.99 KG/M2 | DIASTOLIC BLOOD PRESSURE: 80 MMHG | OXYGEN SATURATION: 97 % | HEIGHT: 65 IN | SYSTOLIC BLOOD PRESSURE: 120 MMHG | HEART RATE: 109 BPM

## 2024-09-25 DIAGNOSIS — IMO0002 LUPUS: Primary | ICD-10-CM

## 2024-09-25 DIAGNOSIS — M32.9 LUPUS: Primary | ICD-10-CM

## 2024-09-25 DIAGNOSIS — M19.90 INFLAMMATORY ARTHRITIS: ICD-10-CM

## 2024-09-25 DIAGNOSIS — Z79.899 HIGH RISK MEDICATION USE: ICD-10-CM

## 2024-09-25 DIAGNOSIS — IMO0002 LUPUS: ICD-10-CM

## 2024-09-25 DIAGNOSIS — M19.90 ARTHRITIS: ICD-10-CM

## 2024-09-25 LAB
ALBUMIN SERPL BCG-MCNC: 4.4 G/DL (ref 3.5–5)
ALP SERPL-CCNC: 45 U/L (ref 34–104)
ALT SERPL W P-5'-P-CCNC: 21 U/L (ref 7–52)
ANION GAP SERPL CALCULATED.3IONS-SCNC: 7 MMOL/L (ref 4–13)
AST SERPL W P-5'-P-CCNC: 15 U/L (ref 13–39)
BASOPHILS # BLD AUTO: 0.04 THOUSANDS/ΜL (ref 0–0.1)
BASOPHILS NFR BLD AUTO: 0 % (ref 0–1)
BILIRUB SERPL-MCNC: 0.23 MG/DL (ref 0.2–1)
BUN SERPL-MCNC: 18 MG/DL (ref 5–25)
CALCIUM SERPL-MCNC: 9 MG/DL (ref 8.4–10.2)
CHLORIDE SERPL-SCNC: 102 MMOL/L (ref 96–108)
CO2 SERPL-SCNC: 32 MMOL/L (ref 21–32)
CREAT SERPL-MCNC: 0.73 MG/DL (ref 0.6–1.3)
CRP SERPL QL: <1 MG/L
EOSINOPHIL # BLD AUTO: 0.17 THOUSAND/ΜL (ref 0–0.61)
EOSINOPHIL NFR BLD AUTO: 2 % (ref 0–6)
ERYTHROCYTE [DISTWIDTH] IN BLOOD BY AUTOMATED COUNT: 12.9 % (ref 11.6–15.1)
ERYTHROCYTE [SEDIMENTATION RATE] IN BLOOD: 3 MM/HOUR (ref 0–19)
GFR SERPL CREATININE-BSD FRML MDRD: 107 ML/MIN/1.73SQ M
GLUCOSE P FAST SERPL-MCNC: 74 MG/DL (ref 65–99)
HCT VFR BLD AUTO: 46.2 % (ref 34.8–46.1)
HGB BLD-MCNC: 15.1 G/DL (ref 11.5–15.4)
IMM GRANULOCYTES # BLD AUTO: 0.03 THOUSAND/UL (ref 0–0.2)
IMM GRANULOCYTES NFR BLD AUTO: 0 % (ref 0–2)
LYMPHOCYTES # BLD AUTO: 2.69 THOUSANDS/ΜL (ref 0.6–4.47)
LYMPHOCYTES NFR BLD AUTO: 29 % (ref 14–44)
MCH RBC QN AUTO: 31.2 PG (ref 26.8–34.3)
MCHC RBC AUTO-ENTMCNC: 32.7 G/DL (ref 31.4–37.4)
MCV RBC AUTO: 96 FL (ref 82–98)
MONOCYTES # BLD AUTO: 1 THOUSAND/ΜL (ref 0.17–1.22)
MONOCYTES NFR BLD AUTO: 11 % (ref 4–12)
NEUTROPHILS # BLD AUTO: 5.29 THOUSANDS/ΜL (ref 1.85–7.62)
NEUTS SEG NFR BLD AUTO: 58 % (ref 43–75)
NRBC BLD AUTO-RTO: 0 /100 WBCS
PLATELET # BLD AUTO: 263 THOUSANDS/UL (ref 149–390)
PMV BLD AUTO: 8.8 FL (ref 8.9–12.7)
POTASSIUM SERPL-SCNC: 4.3 MMOL/L (ref 3.5–5.3)
PROT SERPL-MCNC: 6.8 G/DL (ref 6.4–8.4)
RBC # BLD AUTO: 4.84 MILLION/UL (ref 3.81–5.12)
SODIUM SERPL-SCNC: 141 MMOL/L (ref 135–147)
WBC # BLD AUTO: 9.22 THOUSAND/UL (ref 4.31–10.16)

## 2024-09-25 PROCEDURE — 80053 COMPREHEN METABOLIC PANEL: CPT

## 2024-09-25 PROCEDURE — 36415 COLL VENOUS BLD VENIPUNCTURE: CPT

## 2024-09-25 PROCEDURE — 85652 RBC SED RATE AUTOMATED: CPT

## 2024-09-25 PROCEDURE — 85025 COMPLETE CBC W/AUTO DIFF WBC: CPT

## 2024-09-25 PROCEDURE — 99214 OFFICE O/P EST MOD 30 MIN: CPT | Performed by: INTERNAL MEDICINE

## 2024-09-25 PROCEDURE — 86140 C-REACTIVE PROTEIN: CPT

## 2024-09-25 RX ORDER — CELECOXIB 100 MG/1
100 CAPSULE ORAL 2 TIMES DAILY
Qty: 60 CAPSULE | Refills: 6 | Status: SHIPPED | OUTPATIENT
Start: 2024-09-25

## 2024-09-25 RX ORDER — METHOTREXATE 2.5 MG/1
TABLET ORAL
Qty: 90 TABLET | Refills: 1 | Status: SHIPPED | OUTPATIENT
Start: 2024-09-25

## 2024-09-25 NOTE — PATIENT INSTRUCTIONS
Continue with Hydroxychloroquine 300 mg daily. Continue regular eye exams.  Continue Methotrexate 6 tabs once a week and start folic acid supplement daily.  Stop naproxen; start celecoxib twice daily with food as needed for joint pain  Try diclofenac gel as needed for joint pain  Check labs now, then every 3 months  Do hand and feet x-rays     Return to clinic in 6 months

## 2024-09-25 NOTE — PROGRESS NOTES
RHEUMATOLOGY FOLLOW-UP NOTE    Assessment and Plan:   Laura Christiansen is a 34 y.o.  female who presents for follow-up of lupus with mainly inflammatory arthritis, primarily involving the hands lately. Is doing better on methotrexate; will se how she does with the dose increase, which has only been for the past month. Patient's rheumatologic disease(s) threaten long-term function if not appropriately treated.    Continue with Hydroxychloroquine 300 mg daily. Continue regular eye exams.  Continue Methotrexate 6 tabs once a week and start folic acid supplement daily.  Stop naproxen; start celecoxib twice daily with food as needed for joint pain  Try diclofenac gel as needed for joint pain  Check labs now, then every 3 months   Do hand and feet x-rays    Return to clinic in 6 months  Assessment & Plan    1. Lupus.  Her lupus activity levels are within the normal range. The swelling in her hands has decreased, and joint pain is manageable. She reports no side effects from methotrexate and believes the combination of methotrexate and hydroxychloroquine has reduced the frequency of flares. The current regimen of hydroxychloroquine (1-1/2 tablets daily), regular eye exams, methotrexate (6 tablets once a week), and folic acid (daily) will be continued. Naproxen will be discontinued and replaced with Celebrex 100 mg, to be taken twice daily as needed for joint pain, with food. Diclofenac gel was recommended for topical application. Lab tests were ordered for today and will be repeated every 3 months. X-rays of her hands and feet were also ordered.      Plan:  Diagnoses and all orders for this visit:    Lupus (HCC)  -     methotrexate 2.5 MG tablet; 6 tablet po weekly (take all 6 tablets on the same day every week)  -     XR hand 3+ vw left; Future  -     XR hand 3+ vw right; Future  -     XR foot 3+ vw right; Future  -     XR foot 3+ vw left; Future    Inflammatory arthritis    Arthritis  -     Diclofenac Sodium (VOLTAREN) 1 %;  Apply 2 g topically 4 (four) times a day  -     celecoxib (CeleBREX) 100 mg capsule; Take 1 capsule (100 mg total) by mouth 2 (two) times a day  -     XR hand 3+ vw left; Future  -     XR hand 3+ vw right; Future  -     XR foot 3+ vw right; Future  -     XR foot 3+ vw left; Future    High risk medication use    High risk medication use - Benefits and risks of hydroxychloroquine, including but not limited to retinal toxicity, corneal deposits, gastrointestinal side effects, and headaches were discussed with the patient. The need for a regular eye exam to monitor for ocular toxicity while on this medication was also explained to the patient.     Benefits and risks of methotrexate use, including but not limited to gastrointestinal disturbances such as diarrhea, hair loss, fatigue, stomatitis, leukopenia, lung hypersensitivity reaction, hepatotoxicity, and lymphoma were discussed with the patient.  CBC,CMP will be regularly monitored.  It was also explained to the patient that methotrexate is teratogenic.  Patient was prescribed Folic Acid 1 mg po daily to take while on methotrexate to help prevent side effects. Patient counseled on abstinence from alcohol while using methotrexate.     Follow-up plan: RTC in 6 months         Rheumatic Disease Summary      Chief Complaint  No chief complaint on file.      ELIZA Christiansen is a 34 y.o.  female who presents for follow-up.    History of Present Illness  The patient is a 34-year-old female who presents for a follow-up of her lupus.    She reports an improvement in her lupus symptoms following a 30-day taper of prednisone, which also reduced the swelling in her hands. However, she continues to experience intermittent joint pain, particularly in the knuckles of both hands, with the left thumb being the most affected. She describes episodes of severe swelling in her thumb knuckles during major flares, which occur every few months.    Her current medication regimen includes  methotrexate, which was increased to 6 tablets a month ago, and hydroxychloroquine. She believes this combination has been more effective than hydroxychloroquine alone in reducing the frequency of flares. She also takes naproxen as needed for pain, which she finds more effective than ibuprofen. She has not needed naproxen for the past 2 weeks.    She underwent a colonoscopy in 07/2024, which revealed gastritis, and was prescribed Prilosec. She was advised to limit her use of NSAIDs due to their potential contribution to her gastritis.    She had an eye exam in 09/2024, which was normal. She does not believe she needs further prednisone tapers, as she found the previous taper more effective than Medrol. She has not tried Voltaren gel or diclofenac gel. She had x-rays of her knees but not her hands and feet. She confirms she is not pregnant.    The following portions of the patient's history were reviewed and updated as appropriate: allergies, current medications, past family history, past medical history, past social history, past surgical history and problem list.    Review of Systems:   See HPI    Home Medications:    Current Outpatient Medications:     celecoxib (CeleBREX) 100 mg capsule, Take 1 capsule (100 mg total) by mouth 2 (two) times a day, Disp: 60 capsule, Rfl: 6    Diclofenac Sodium (VOLTAREN) 1 %, Apply 2 g topically 4 (four) times a day, Disp: 100 g, Rfl: 6    methotrexate 2.5 MG tablet, 6 tablet po weekly (take all 6 tablets on the same day every week), Disp: 90 tablet, Rfl: 1    albuterol (2.5 mg/3 mL) 0.083 % nebulizer solution, Take 3 mL (2.5 mg total) by nebulization every 6 (six) hours as needed for wheezing or shortness of breath, Disp: 90 mL, Rfl: 3    albuterol (PROVENTIL HFA,VENTOLIN HFA) 90 mcg/act inhaler, Inhale 2 puffs every 4 (four) hours as needed for wheezing, Disp: 18 g, Rfl: 4    budesonide-formoterol (Symbicort) 160-4.5 mcg/act inhaler, Inhale 2 puffs 2 (two) times a day Rinse mouth  "after use, Disp: 10.2 g, Rfl: 11    Cholecalciferol (Vitamin D-3) 125 MCG (5000 UT) TABS, Take by mouth, Disp: , Rfl:     citalopram (CeleXA) 40 mg tablet, , Disp: , Rfl:     folic acid (FOLVITE) 1 mg tablet, Take 1 tablet (1 mg total) by mouth daily, Disp: 90 tablet, Rfl: 1    hydroxychloroquine (PLAQUENIL) 200 mg tablet, Take 1.5 tablets (300 mg total) by mouth in the morning, Disp: 135 tablet, Rfl: 1    Multiple Vitamin (MULTIVITAMIN) tablet, Take 1 tablet by mouth daily, Disp: , Rfl:     norethindrone (Ortho Micronor) 0.35 MG tablet, Take 1 tablet (0.35 mg total) by mouth daily, Disp: 84 tablet, Rfl: 1    omeprazole (PriLOSEC) 20 mg delayed release capsule, Take 1 capsule (20 mg total) by mouth daily, Disp: 90 capsule, Rfl: 0    tiotropium (Spiriva Respimat) 1.25 MCG/ACT AERS inhaler, Inhale 2 puffs daily, Disp: 4 g, Rfl: 11    Objective:    Vitals:    09/25/24 0917   BP: 120/80   Pulse: (!) 109   SpO2: 97%   Weight: 70.8 kg (156 lb)   Height: 5' 5\" (1.651 m)       Physical Exam  Constitutional:       General: She is not in acute distress.  HENT:      Head: Normocephalic and atraumatic.   Eyes:      Conjunctiva/sclera: Conjunctivae normal.   Cardiovascular:      Rate and Rhythm: Normal rate and regular rhythm.      Heart sounds: S1 normal and S2 normal.      No friction rub.   Pulmonary:      Effort: Pulmonary effort is normal. No respiratory distress.      Breath sounds: Normal breath sounds. No wheezing, rhonchi or rales.   Musculoskeletal:         General: Tenderness present.      Cervical back: Neck supple.   Skin:     Coloration: Skin is not pale.   Neurological:      Mental Status: She is alert. Mental status is at baseline.   Psychiatric:         Mood and Affect: Mood normal.         Behavior: Behavior normal.       Physical Exam  Tenderness noted in the right first MCP, right third and fourth MCP, and right third and fourth PIP. Tenderness also noted in the left third and fourth MCP, left fourth PIP, " left first IP, and MCP.    Reviewed labs and imaging.    Imaging:   No results found.    Labs:   Appointment on 09/25/2024   Component Date Value Ref Range Status    WBC 09/25/2024 9.22  4.31 - 10.16 Thousand/uL Final    RBC 09/25/2024 4.84  3.81 - 5.12 Million/uL Final    Hemoglobin 09/25/2024 15.1  11.5 - 15.4 g/dL Final    Hematocrit 09/25/2024 46.2 (H)  34.8 - 46.1 % Final    MCV 09/25/2024 96  82 - 98 fL Final    MCH 09/25/2024 31.2  26.8 - 34.3 pg Final    MCHC 09/25/2024 32.7  31.4 - 37.4 g/dL Final    RDW 09/25/2024 12.9  11.6 - 15.1 % Final    MPV 09/25/2024 8.8 (L)  8.9 - 12.7 fL Final    Platelets 09/25/2024 263  149 - 390 Thousands/uL Final    nRBC 09/25/2024 0  /100 WBCs Final    Segmented % 09/25/2024 58  43 - 75 % Final    Immature Grans % 09/25/2024 0  0 - 2 % Final    Lymphocytes % 09/25/2024 29  14 - 44 % Final    Monocytes % 09/25/2024 11  4 - 12 % Final    Eosinophils Relative 09/25/2024 2  0 - 6 % Final    Basophils Relative 09/25/2024 0  0 - 1 % Final    Absolute Neutrophils 09/25/2024 5.29  1.85 - 7.62 Thousands/µL Final    Absolute Immature Grans 09/25/2024 0.03  0.00 - 0.20 Thousand/uL Final    Absolute Lymphocytes 09/25/2024 2.69  0.60 - 4.47 Thousands/µL Final    Absolute Monocytes 09/25/2024 1.00  0.17 - 1.22 Thousand/µL Final    Eosinophils Absolute 09/25/2024 0.17  0.00 - 0.61 Thousand/µL Final    Basophils Absolute 09/25/2024 0.04  0.00 - 0.10 Thousands/µL Final    Sodium 09/25/2024 141  135 - 147 mmol/L Final    Potassium 09/25/2024 4.3  3.5 - 5.3 mmol/L Final    Chloride 09/25/2024 102  96 - 108 mmol/L Final    CO2 09/25/2024 32  21 - 32 mmol/L Final    ANION GAP 09/25/2024 7  4 - 13 mmol/L Final    BUN 09/25/2024 18  5 - 25 mg/dL Final    Creatinine 09/25/2024 0.73  0.60 - 1.30 mg/dL Final    Standardized to IDMS reference method    Glucose, Fasting 09/25/2024 74  65 - 99 mg/dL Final    Calcium 09/25/2024 9.0  8.4 - 10.2 mg/dL Final    AST 09/25/2024 15  13 - 39 U/L Final    ALT  09/25/2024 21  7 - 52 U/L Final    Specimen collection should occur prior to Sulfasalazine administration due to the potential for falsely depressed results.     Alkaline Phosphatase 09/25/2024 45  34 - 104 U/L Final    Total Protein 09/25/2024 6.8  6.4 - 8.4 g/dL Final    Albumin 09/25/2024 4.4  3.5 - 5.0 g/dL Final    Total Bilirubin 09/25/2024 0.23  0.20 - 1.00 mg/dL Final    Use of this assay is not recommended for patients undergoing treatment with eltrombopag due to the potential for falsely elevated results.  N-acetyl-p-benzoquinone imine (metabolite of Acetaminophen) will generate erroneously low results in samples for patients that have taken an overdose of Acetaminophen.    eGFR 09/25/2024 107  ml/min/1.73sq m Final    CRP 09/25/2024 <1.0  <3.0 mg/L Final    Sed Rate 09/25/2024 3  0 - 19 mm/hour Final   Office Visit on 09/12/2024   Component Date Value Ref Range Status     RAPID STREP A 09/12/2024 Negative  Negative Final    VALID CONTROL 09/12/2024 Valid   Final    Throat Culture 09/12/2024 Negative for beta-hemolytic Streptococcus   Final   Hospital Outpatient Visit on 06/25/2024   Component Date Value Ref Range Status    EXT Preg Test, Ur 06/25/2024 Negative  Negative Final    Control 06/25/2024 Valid  Valid Final    Case Report 06/25/2024    Final                    Value:Surgical Pathology Report                         Case: [id number]                                  Authorizing Provider:  Yohannes Redd MD       Collected:           06/25/2024 1445              Ordering Location:     [facility]        Received:            06/25/2024 [address] [place] [address] Endoscopy                                                     Pathologist:           Ash Alexander MD                                                         Specimens:   A) - Duodenum, cold bx r/o celiac                                                                   B) - Stomach, cold bx r/o  HPylori                                                                   C) - Colon, random colon cold bx r/o microscopic colitis, r/o IBD                                   D) - Rectum, cold bx rectum r/o IBD                                                        Final Diagnosis 06/25/2024    Final                    Value:A. Duodenum, cold bx r/o celiac:      - Duodenal mucosa with normal villous architecture and no significant histopathologic abnormalities      - No increase in intraepithelial lymphocytosis    B. Stomach, cold bx r/o HPylori:      - Gastric antral-type mucosa with focal reactive gastropathy-like changes      - Negative for intestinal metaplasia, dysplasia or carcinoma      - An H pylori immunohistochemical stain is negative    C. Colon, random colon cold bx r/o microscopic colitis, r/o IBD:      - Colonic mucosa with no significant histopathologic abnormalities      - No evidence of lymphocytic, collagenous colitis, or active inflammation     D. Rectum, cold bx rectum r/o IBD:      - Colonic mucosa with no significant histopathologic abnormalities      - No evidence of lymphocytic, collagenous colitis, or active inflammation          Additional Information 06/25/2024    Final                    Value:Interpretation performed at [facility], [address] [place] [address], [place] [address]    All reported additional testing was performed with appropriately reactive controls.  These tests were developed and their performance characteristics determined by [facility] or appropriate performing facility, though some tests may be performed on tissues which have not been validated for performance characteristics (such as staining performed on alcohol exposed cell blocks and decalcified tissues).  Results should be interpreted with caution and in the context of the patients’ clinical condition. These tests may not be cleared or approved by the U.S. Food and Drug Administration,  "though the FDA has determined that such clearance or approval is not necessary. These tests are used for clinical purposes and they should not be regarded as investigational or for research. This laboratory has been approved by CLIA 88, designated as a high-complexity laboratory and is qualified                           to perform these tests.  .      Gross Description 06/25/2024    Final                    Value:A. The specimen is received in formalin, labeled with the patient's name and hospital number, and is designated \" duodenum cold biopsy rule out celiac\".  The specimen consists of multiple tan-red, rubbery, friable soft tissue fragments in aggregate measuring 1.1 x 0.5 x 0.2 cm.  Entirely submitted. Screened cassette.  B. The specimen is received in formalin, labeled with the patient's name and hospital number, and is designated \" stomach cold biopsy rule out H. pylori\".  The specimen consists of multiple tan-red, rubbery, friable soft tissue fragments in aggregate measuring 0.9 x 0.5 x 0.2 cm.  Entirely submitted. Screened cassette.  C. The specimen is received in formalin, labeled with the patient's name and hospital number, and is designated \" random colon cold biopsy rule out microscopic colitis, rule out IBD\".  The specimen consists of multiple tan-red, rubbery, friable, gelatinous soft tissue fragments in aggregate measuring 1.0 x 0.5 x 0.2 cm.  Entirely submitted. Screened                           cassette.   D. The specimen is received in formalin, labeled with the patient's name and hospital number, and is designated \" cold biopsy rectum rule out IBD\".  The specimen consists of multiple tan-red, rubbery, friable soft tissue fragments in aggregate measuring 0.9 x 0.5 x 0.2 cm.  Entirely submitted. Screened cassette.    Note: The estimated total formalin fixation time based upon information provided by the submitting clinician and the standard processing schedule is under 72 hours.    Bandar   "   Appointment on 06/20/2024   Component Date Value Ref Range Status    Prolactin 06/20/2024 14.29  3.34 - 26.72 ng/mL Final    PROLACTIN:     Note: Normal ranges may not apply to patients who are transgender, non-binary, or whose legal sex, sex at birth, and gender identity differ.   Appointment on 06/10/2024   Component Date Value Ref Range Status    WBC 06/10/2024 4.57  4.31 - 10.16 Thousand/uL Final    RBC 06/10/2024 4.69  3.81 - 5.12 Million/uL Final    Hemoglobin 06/10/2024 15.0  11.5 - 15.4 g/dL Final    Hematocrit 06/10/2024 44.7  34.8 - 46.1 % Final    MCV 06/10/2024 95  82 - 98 fL Final    MCH 06/10/2024 32.0  26.8 - 34.3 pg Final    MCHC 06/10/2024 33.6  31.4 - 37.4 g/dL Final    RDW 06/10/2024 12.6  11.6 - 15.1 % Final    MPV 06/10/2024 9.2  8.9 - 12.7 fL Final    Platelets 06/10/2024 249  149 - 390 Thousands/uL Final    nRBC 06/10/2024 0  /100 WBCs Final    Segmented % 06/10/2024 49  43 - 75 % Final    Immature Grans % 06/10/2024 0  0 - 2 % Final    Lymphocytes % 06/10/2024 35  14 - 44 % Final    Monocytes % 06/10/2024 10  4 - 12 % Final    Eosinophils Relative 06/10/2024 5  0 - 6 % Final    Basophils Relative 06/10/2024 1  0 - 1 % Final    Absolute Neutrophils 06/10/2024 2.26  1.85 - 7.62 Thousands/µL Final    Absolute Immature Grans 06/10/2024 0.01  0.00 - 0.20 Thousand/uL Final    Absolute Lymphocytes 06/10/2024 1.61  0.60 - 4.47 Thousands/µL Final    Absolute Monocytes 06/10/2024 0.44  0.17 - 1.22 Thousand/µL Final    Eosinophils Absolute 06/10/2024 0.21  0.00 - 0.61 Thousand/µL Final    Basophils Absolute 06/10/2024 0.04  0.00 - 0.10 Thousands/µL Final    Sodium 06/10/2024 140  135 - 147 mmol/L Final    Potassium 06/10/2024 4.2  3.5 - 5.3 mmol/L Final    Chloride 06/10/2024 105  96 - 108 mmol/L Final    CO2 06/10/2024 28  21 - 32 mmol/L Final    ANION GAP 06/10/2024 7  4 - 13 mmol/L Final    BUN 06/10/2024 11  5 - 25 mg/dL Final    Creatinine 06/10/2024 0.65  0.60 - 1.30 mg/dL Final    Standardized  to IDMS reference method    Glucose 06/10/2024 100  65 - 140 mg/dL Final    If the patient is fasting, the ADA then defines impaired fasting glucose as > 100 mg/dL and diabetes as > or equal to 123 mg/dL.    Calcium 06/10/2024 8.8  8.4 - 10.2 mg/dL Final    AST 06/10/2024 15  13 - 39 U/L Final    ALT 06/10/2024 14  7 - 52 U/L Final    Specimen collection should occur prior to Sulfasalazine administration due to the potential for falsely depressed results.     Alkaline Phosphatase 06/10/2024 34  34 - 104 U/L Final    Total Protein 06/10/2024 6.6  6.4 - 8.4 g/dL Final    Albumin 06/10/2024 4.4  3.5 - 5.0 g/dL Final    Total Bilirubin 06/10/2024 0.37  0.20 - 1.00 mg/dL Final    Use of this assay is not recommended for patients undergoing treatment with eltrombopag due to the potential for falsely elevated results.  N-acetyl-p-benzoquinone imine (metabolite of Acetaminophen) will generate erroneously low results in samples for patients that have taken an overdose of Acetaminophen.    eGFR 06/10/2024 117  ml/min/1.73sq m Final    Color, UA 06/10/2024 Light Yellow   Final    Clarity, UA 06/10/2024 Clear   Final    Specific Gravity, UA 06/10/2024 1.010  1.003 - 1.030 Final    pH, UA 06/10/2024 7.0  4.5, 5.0, 5.5, 6.0, 6.5, 7.0, 7.5, 8.0 Final    Leukocytes, UA 06/10/2024 Negative  Negative Final    Nitrite, UA 06/10/2024 Negative  Negative Final    Protein, UA 06/10/2024 Trace (A)  Negative mg/dl Final    Glucose, UA 06/10/2024 Negative  Negative mg/dl Final    Ketones, UA 06/10/2024 Negative  Negative mg/dl Final    Urobilinogen, UA 06/10/2024 <2.0  <2.0 mg/dl mg/dl Final    Bilirubin, UA 06/10/2024 Negative  Negative Final    Occult Blood, UA 06/10/2024 Negative  Negative Final    RBC, UA 06/10/2024 None Seen  None Seen, 1-2 /hpf Final    WBC, UA 06/10/2024 2-4 (A)  None Seen, 1-2 /hpf Final    Epithelial Cells 06/10/2024 Occasional  None Seen, Occasional /hpf Final    Bacteria, UA 06/10/2024 Occasional  None Seen,  Occasional /hpf Final    MUCUS THREADS 06/10/2024 Occasional (A)  None Seen Final    Creatinine, Ur 06/10/2024 69.0  Reference range not established. mg/dL Final    Protein Urine Random 06/10/2024 4  Reference range not established. mg/dL Final    Prot/Creat Ratio, Ur 06/10/2024 0.06  0.00 - 0.10 Final   Orders Only on 06/10/2024   Component Date Value Ref Range Status    C4, COMPLEMENT 06/10/2024 29  19 - 52 mg/dL Final    C3 Complement 06/10/2024 94  87 - 200 mg/dL Final    ds DNA Ab 06/10/2024 9  0 - 9 IU/mL Final                                       Negative      <5                                     Equivocal  5 - 9                                     Positive      >9    Sed Rate 06/10/2024 3  0 - 19 mm/hour Final   Lab on 06/03/2024   Component Date Value Ref Range Status    Vit D, 25-Hydroxy 06/03/2024 57.2  30.0 - 100.0 ng/mL Final    Vitamin D guidelines established by Clinical Guidelines Subcommittee  of the Endocrine Society Task Force, 2011    Deficiency <20ng/ml   Insufficiency 20-30ng/ml   Sufficient  ng/ml     Vitamin B-12 06/03/2024 188  180 - 914 pg/mL Final   Appointment on 05/30/2024   Component Date Value Ref Range Status    Calprotectin 05/30/2024 5  0 - 120 ug/g Final    Concentration     Interpretation   Follow-Up  < 5 - 50 ug/g     Normal           None  >50 -120 ug/g     Borderline       Re-evaluate in 4-6 weeks      >120 ug/g     Abnormal         Repeat as clinically                                     indicated    C.difficile toxin by PCR 05/30/2024 Negative  Negative Final         Salmonella sp PCR 05/30/2024 Negative  Negative Final         Shigella sp/Enteroinvasive E. coli* 05/30/2024 Negative  Negative Final         Campylobacter sp (jejuni and coli)* 05/30/2024 Negative  Negative Final         Shiga toxin 1/Shiga toxin 2 genes * 05/30/2024 Negative  Negative Final         H pylori Ag, Stl 05/30/2024 Negative  Negative Final   Office Visit on 05/29/2024   Component Date Value  Ref Range Status     COLOR,UA 05/29/2024 yellow   Final    CLARITY,UA 05/29/2024 clear   Final    SPECIFIC GRAVITY,UA 05/29/2024 1.020   Final     PH,UA 05/29/2024 6.0   Final    LEUKOCYTE ESTERASE,UA 05/29/2024 negative   Final    NITRITE,UA 05/29/2024 negative   Final    GLUCOSE, UA 05/29/2024 negative   Final    KETONES,UA 05/29/2024 negative   Final    BILIRUBIN,UA 05/29/2024 negative   Final    BLOOD,UA 05/29/2024 trace-intact   Final    POCT URINE PROTEIN 05/29/2024 negative   Final    SL AMB POCT UROBILINOGEN 05/29/2024 0.2 E.U   Final   Appointment on 05/29/2024   Component Date Value Ref Range Status    IGA 05/29/2024 89  66 - 433 mg/dL Final    TISSUE TRANSGLUTAMINASE IGA 05/29/2024 <2  0 - 3 U/mL Final                                  Negative        0 -  3                                Weak Positive   4 - 10                                Positive           >10   Tissue Transglutaminase (tTG) has been identified   as the endomysial antigen.  Studies have demonstr-   ated that endomysial IgA antibodies have over 99%   specificity for gluten sensitive enteropathy.    Sodium 05/29/2024 140  135 - 147 mmol/L Final    Potassium 05/29/2024 5.0  3.5 - 5.3 mmol/L Final    Chloride 05/29/2024 105  96 - 108 mmol/L Final    CO2 05/29/2024 28  21 - 32 mmol/L Final    ANION GAP 05/29/2024 7  4 - 13 mmol/L Final    BUN 05/29/2024 18  5 - 25 mg/dL Final    Creatinine 05/29/2024 0.69  0.60 - 1.30 mg/dL Final    Standardized to IDMS reference method    Glucose, Fasting 05/29/2024 95  65 - 99 mg/dL Final    Calcium 05/29/2024 9.2  8.4 - 10.2 mg/dL Final    AST 05/29/2024 17  13 - 39 U/L Final    ALT 05/29/2024 17  7 - 52 U/L Final    Specimen collection should occur prior to Sulfasalazine administration due to the potential for falsely depressed results.     Alkaline Phosphatase 05/29/2024 34  34 - 104 U/L Final    Total Protein 05/29/2024 6.8  6.4 - 8.4 g/dL Final    Albumin 05/29/2024 4.5  3.5 - 5.0 g/dL Final    Total  Bilirubin 05/29/2024 0.42  0.20 - 1.00 mg/dL Final    Use of this assay is not recommended for patients undergoing treatment with eltrombopag due to the potential for falsely elevated results.  N-acetyl-p-benzoquinone imine (metabolite of Acetaminophen) will generate erroneously low results in samples for patients that have taken an overdose of Acetaminophen.    eGFR 05/29/2024 114  ml/min/1.73sq m Final    CRP 05/29/2024 <1.0  <3.0 mg/L Final    WBC 05/29/2024 4.00 (L)  4.31 - 10.16 Thousand/uL Final    RBC 05/29/2024 4.82  3.81 - 5.12 Million/uL Final    Hemoglobin 05/29/2024 15.3  11.5 - 15.4 g/dL Final    Hematocrit 05/29/2024 45.9  34.8 - 46.1 % Final    MCV 05/29/2024 95  82 - 98 fL Final    MCH 05/29/2024 31.7  26.8 - 34.3 pg Final    MCHC 05/29/2024 33.3  31.4 - 37.4 g/dL Final    RDW 05/29/2024 12.6  11.6 - 15.1 % Final    MPV 05/29/2024 8.9  8.9 - 12.7 fL Final    Platelets 05/29/2024 224  149 - 390 Thousands/uL Final    nRBC 05/29/2024 0  /100 WBCs Final    Segmented % 05/29/2024 55  43 - 75 % Final    Immature Grans % 05/29/2024 0  0 - 2 % Final    Lymphocytes % 05/29/2024 30  14 - 44 % Final    Monocytes % 05/29/2024 10  4 - 12 % Final    Eosinophils Relative 05/29/2024 4  0 - 6 % Final    Basophils Relative 05/29/2024 1  0 - 1 % Final    Absolute Neutrophils 05/29/2024 2.19  1.85 - 7.62 Thousands/µL Final    Absolute Immature Grans 05/29/2024 0.01  0.00 - 0.20 Thousand/uL Final    Absolute Lymphocytes 05/29/2024 1.20  0.60 - 4.47 Thousands/µL Final    Absolute Monocytes 05/29/2024 0.40  0.17 - 1.22 Thousand/µL Final    Eosinophils Absolute 05/29/2024 0.16  0.00 - 0.61 Thousand/µL Final    Basophils Absolute 05/29/2024 0.04  0.00 - 0.10 Thousands/µL Final    Iron Saturation 05/29/2024 52 (H)  15 - 50 % Final    TIBC 05/29/2024 336  250 - 450 ug/dL Final    Iron 05/29/2024 175  50 - 212 ug/dL Final    Patients treated with metal-binding drugs (ie. Deferoxamine) may have depressed iron values.    UIBC  05/29/2024 161  155 - 355 ug/dL Final    Ferritin 05/29/2024 25  11 - 307 ng/mL Final   There may be more visits with results that are not included.      ACT team- See SW notes

## 2024-09-26 ENCOUNTER — CLINICAL SUPPORT (OUTPATIENT)
Dept: FAMILY MEDICINE CLINIC | Facility: CLINIC | Age: 34
End: 2024-09-26
Payer: COMMERCIAL

## 2024-09-26 ENCOUNTER — APPOINTMENT (OUTPATIENT)
Dept: RADIOLOGY | Facility: MEDICAL CENTER | Age: 34
End: 2024-09-26
Payer: COMMERCIAL

## 2024-09-26 DIAGNOSIS — M32.9 LUPUS: ICD-10-CM

## 2024-09-26 DIAGNOSIS — M19.90 ARTHRITIS: ICD-10-CM

## 2024-09-26 DIAGNOSIS — E53.8 VITAMIN B12 DEFICIENCY: Primary | ICD-10-CM

## 2024-09-26 DIAGNOSIS — IMO0002 LUPUS: ICD-10-CM

## 2024-09-26 PROCEDURE — 96372 THER/PROPH/DIAG INJ SC/IM: CPT

## 2024-09-26 PROCEDURE — 73130 X-RAY EXAM OF HAND: CPT

## 2024-09-26 PROCEDURE — 73630 X-RAY EXAM OF FOOT: CPT

## 2024-09-26 RX ADMIN — CYANOCOBALAMIN 1000 MCG: 1000 INJECTION, SOLUTION INTRAMUSCULAR; SUBCUTANEOUS at 09:00

## 2024-10-03 ENCOUNTER — CONSULT (OUTPATIENT)
Dept: GENETICS | Facility: CLINIC | Age: 34
End: 2024-10-03
Payer: COMMERCIAL

## 2024-10-03 ENCOUNTER — DOCUMENTATION (OUTPATIENT)
Dept: GENETICS | Facility: CLINIC | Age: 34
End: 2024-10-03

## 2024-10-03 DIAGNOSIS — Z84.81 FAMILY HISTORY OF BRCA GENE POSITIVE: ICD-10-CM

## 2024-10-03 PROCEDURE — 36415 COLL VENOUS BLD VENIPUNCTURE: CPT

## 2024-10-03 NOTE — PROGRESS NOTES
Pre-Test Genetic Counseling Consult Note    Patient Name: Laura Christiansen   /Age:  y.o.  Referring Provider:  Debbie Cummings MD    Date of Service: 10/3/2024  Genetic Counselor: Meghan Humphrey MS, [facility]  Interpretation Services: None  Location: In-person consult at [place]  Length of Visit:  45 min    Laura was referred to the [facility] Cancer Risk and Genetic Assessment Program due to her family history of cancer . she presents today to discuss the possibility of a hereditary cancer syndrome, options for genetic testing, and implications for her and her family.       Cancer History and Treatment:   Personal History:   Oncology History    No history exists.     Screening Hx:   Breast:  Breast Imaging: Mammo diagnostic bilateral w 3d & cad and US breast left limited (diagnostic)  23    IMPRESSION:  No focal or worrisome findings.  Recommend clinical management for palpable abnormalities.  Patient should have screening mammogram at age 40 unless there is clinical indication for imaging sooner    Breast Biopsy: None    Breast Density: Extremely dense    Colon:  Colonoscopy and EGD: 2024    Final Diagnosis   A. Duodenum, cold bx r/o celiac:      - Duodenal mucosa with normal villous architecture and no significant histopathologic abnormalities      - No increase in intraepithelial lymphocytosis     B. Stomach, cold bx r/o HPylori:      - Gastric antral-type mucosa with focal reactive gastropathy-like changes      - Negative for intestinal metaplasia, dysplasia or carcinoma      - An H pylori immunohistochemical stain is negative     C. Colon, random colon cold bx r/o microscopic colitis, r/o IBD:      - Colonic mucosa with no significant histopathologic abnormalities      - No evidence of lymphocytic, collagenous colitis, or active inflammation      D. Rectum, cold bx rectum r/o IBD:      - Colonic mucosa with no significant histopathologic abnormalities      - No evidence of lymphocytic,  "collagenous colitis, or active inflammation       0 polyps reported    Gynecologic:  Ovaries and Uterus intact     Bilateral salpingectomy 10/27/2016    Skin:  No current screening    Other screening: None    Reproductive History  Age at menarche: 11  Age at first live birth:  22  Menopause: Premenopausal  Hormone replacement: None    Medical and Surgical History  Pertinent surgical history:   Past Surgical History:   Procedure Laterality Date    CARDIAC CATHETERIZATION  8/29/2022    Procedure: Cardiac catheterization;  Surgeon: Jethro Hood MD;  [facility];  Service: Cardiology    CARDIAC CATHETERIZATION N/A 8/29/2022    Procedure: Cardiac Coronary Angiogram;  Surgeon: Jethro Hood MD;  [facility];  Service: Cardiology    CHOLECYSTECTOMY  08/2010    Laparoscopic    COLONOSCOPY N/A 5/9/2018    Procedure: COLONOSCOPY;  Surgeon: Zaid Dunlap MD;  Location: [facility] [place] [facility];  Service: Gastroenterology    OK EXCISION PILONIDAL CYST/SINUS SIMPLE N/A 4/28/2016    Procedure: EXCISION PILONIDAL CYST;  Surgeon: Neena Levine MD;  [facility];  Service: General    OK LAPAROSCOPY W/RMVL ADNEXAL STRUCTURES Bilateral 10/27/2016    Procedure: SALPINGECTOMY;  Surgeon: Elly Brothers MD;  [facility];  Service: Gynecology    SALPINGECTOMY      WISDOM TOOTH EXTRACTION  01/2016    right upper only      Pertinent medical history:  Past Medical History:   Diagnosis Date    Anxiety     Asthma December 2022    Depression     Lupus (HCC) 2024    Miscarriage 2011    Pilonidal cyst with abscess     Seasonal allergies     Varicella     Wears glasses        Other History:  Height:   Ht Readings from Last 1 Encounters:   09/25/24 5' 5\" (1.651 m)     Weight:   Wt Readings from Last 1 Encounters:   09/25/24 70.8 kg (156 lb)       Relevant Family History   Patient reports no Ashkenazi Religion ancestry.     Laura has limited contact/information from both sides of her family. She reports " that her father, two paternal aunts, and paternal grandmother all have BRCA1 and BRCA2 variants. The genetic test reports of her relatives are not available.     Maternal Family History:  Family history is unknown    Paternal Family History:  Aunt with breast cancer at age 64 (currently age 64)  Aunt with breast cancer at unknown age (currently age 60's)  Grandmother with unspecified skin cancer (currently age 90's)    Please refer to the scanned pedigree in the Media Tab for a complete family history     *All history is reported as provided by the patient; records are not available for review, except where indicated.     Assessment:  We discussed sporadic, familial and hereditary cancer.  We also discussed the many factors that influence our risk for cancer such as age, environmental exposures, lifestyle choices and family history.      We reviewed the indications suggestive of a hereditary predisposition to cancer. We discussed that both the BRCA1 and BRCA2 genes are associated with autosomal dominant hereditary breast and ovarian cancer (HBOC) syndrome. Considering Laura's father reports having these variants, Laura is at a 50% risk to have the same BRCA1 variant and also at a 50% risk to have the same BRCA2 variant. We reviewed the screening and management recommendations for HBOC.    Genetic testing is indicated for Laura based on the following criteria: Meets NCCN V1.2025 General Testing Criteria (Located on page CRIT-1 in the Genetic/Familial High Risk Assessment: Breast, Ovarian and Pancreatic Guideline): Laura has blood relatives (father, two paternal aunts, and paternal grandmother) with a known P/LP variant in a cancer susceptibility gene (BRCA1 and BRCA2)    The risks, benefits, and limitations of genetic testing were reviewed with the patient, as well as genetic discrimination laws, and possible test results (positive, negative, variants of uncertain significance) and their clinical implications. If  positive for a mutation, options for managing cancer risk including increased surveillance, chemoprevention, and in some cases prophylactic surgery were discussed. Laura was informed that if a hereditary cancer syndrome was identified in her, first degree relatives (parents, siblings, and children) have a chance of also inheriting the condition. Genetic testing would allow for predictive genetic testing in other relatives, who may also be at risk depending on their degree of relation.     Billing:  Most individuals pay <$100 for hereditary cancer genetic testing. If insurance covers the cost of the testing, individuals may still pay out of pocket secondary to co-pays, co-insurance, or deductibles. If the cost of the testing exceeds $100, the lab will reach out to the patient via phone or e-mail. The patient will then have the option to proceed with the testing, cancel the testing, or elect the self-pay option of $250. Laura verbalized understanding.     Based on Laura's primary insurance being Medicaid and [facility]'s billing policy, she should expect an out of pocket cost of $0. Laura verbalized understanding.    Plan: Patient decided to proceed with testing and provided consent.    Summary:     Sample Collection:  The patient's blood sample was drawn in the office on 10/3/2024 by genetic counseling assistant Kishor Wan    Genetic Testing Preformed: CancerNext-Expanded + RNA (71 genes): AIP, ALK, APC, KO, AXIN2, BAP1, BARD1, BMPR1A, BRCA1, BRCA2, BRIP1, CDC73, CDH1, CDK4, CDKN1B, CDKN2A, CHEK2, CTNNA1, DICER1, EGFR, EGLN1, EPCAM, FH, FLCN, GREM1, HOXB13, KIF1B, KIT, LZTR1, MAX, MEN1, MET, MITF, MLH1, MSH2, MSH3, MSH6, MUTYH, NF1, NF2, NTHL1, PALB2, PDGFRA, PHOX2B, PMS2, POLD1, POLE, POT1, HLKTW2E, PTCH1, PTEN, RAD51C, RAD51D, RB1, RET, SDHA, SDHAF2, SDHB, SDHC, SDHD, SMAD4, SMARCA4, SMARCB1, SMARCE1, STK11, SUFU, OOGZ060, TP53, TSC1, TSC2, VHL    Result Call Information:  In the event that we need to reach  Laura via telephone:  I confirmed the patient's mobile number on file as the best number to call with results  I confirmed with the patient that we can leave a voicemail on the provided numbers    Results take approximately 2-3 weeks to complete once test is started.    Laura will be notified via PowerCardt once results are available.      Additional recommendations for surveillance/medical management will be made pending genetic test results.

## 2024-10-05 DIAGNOSIS — N94.6 DYSMENORRHEA: ICD-10-CM

## 2024-10-05 DIAGNOSIS — N92.0 MENORRHAGIA WITH REGULAR CYCLE: ICD-10-CM

## 2024-10-07 RX ORDER — NORETHINDRONE 0.35 MG/1
1 TABLET ORAL DAILY
Qty: 84 TABLET | Refills: 0 | Status: SHIPPED | OUTPATIENT
Start: 2024-10-07

## 2024-10-28 ENCOUNTER — OFFICE VISIT (OUTPATIENT)
Dept: URGENT CARE | Facility: MEDICAL CENTER | Age: 34
End: 2024-10-28
Payer: COMMERCIAL

## 2024-10-28 VITALS
RESPIRATION RATE: 22 BRPM | DIASTOLIC BLOOD PRESSURE: 82 MMHG | HEART RATE: 102 BPM | OXYGEN SATURATION: 99 % | SYSTOLIC BLOOD PRESSURE: 129 MMHG | TEMPERATURE: 99.7 F

## 2024-10-28 DIAGNOSIS — J01.40 ACUTE NON-RECURRENT PANSINUSITIS: Primary | ICD-10-CM

## 2024-10-28 PROCEDURE — 99213 OFFICE O/P EST LOW 20 MIN: CPT

## 2024-10-28 NOTE — PROGRESS NOTES
[facility]        NAME: Laura Christiansen is a 34 y.o. female  : 1990    MRN: [id number]  DATE: 2024  TIME: 6:50 PM    Assessment and Plan   Acute non-recurrent pansinusitis [J01.40]  1. Acute non-recurrent pansinusitis  amoxicillin-clavulanate (AUGMENTIN) 875-125 mg per tablet    Ambulatory Referral to Otolaryngology      Supportive care as discussed. Hydration, humidifier, rest. Flonase recommended. ENT referral provided for frequent sinusitis.  Patient Instructions     Antibiotics given for sinusitis. Recommend OTC decongestants.  Follow up with PCP in 3-5 days if no improvement. Proceed to ER if symptoms worsen.    Chief Complaint     Chief Complaint   Patient presents with    Cold Like Symptoms     Cough, congestion, sore throat, sinus pressure, eye discharge. Has had symptoms for past week but getting worse. OTC meds not helping     History of Present Illness     Laura Christiansen is a 34 y.o. female presenting to the office today for facial pain and congestion.  Symptoms have been present for 7 days, and include cough, congestion, sore throat, sinus pressure, teeth pain, eye discharge.   She has tried Dayquil/Nyquil for her symptoms, no relief.  Sick contacts include: child at home    Review of Systems     Review of Systems   Constitutional:  Positive for chills and fever.   HENT:  Positive for congestion, ear pain, sinus pressure, sinus pain and sore throat.    Eyes:  Positive for discharge.   Respiratory:  Positive for cough and wheezing. Negative for shortness of breath.    Gastrointestinal:  Negative for nausea and vomiting.       Current Medications       Current Outpatient Medications:     amoxicillin-clavulanate (AUGMENTIN) 875-125 mg per tablet, Take 1 tablet by mouth every 12 (twelve) hours for 7 days, Disp: 14 tablet, Rfl: 0    albuterol (PROVENTIL HFA,VENTOLIN HFA) 90 mcg/act inhaler, Inhale 2 puffs every 4 (four) hours as needed for wheezing, Disp: 18 g, Rfl: 4     budesonide-formoterol (Symbicort) 160-4.5 mcg/act inhaler, Inhale 2 puffs 2 (two) times a day Rinse mouth after use, Disp: 10.2 g, Rfl: 11    celecoxib (CeleBREX) 100 mg capsule, Take 1 capsule (100 mg total) by mouth 2 (two) times a day, Disp: 60 capsule, Rfl: 6    Cholecalciferol (Vitamin D-3) 125 MCG (5000 UT) TABS, Take by mouth, Disp: , Rfl:     citalopram (CeleXA) 40 mg tablet, , Disp: , Rfl:     Diclofenac Sodium (VOLTAREN) 1 %, Apply 2 g topically 4 (four) times a day, Disp: 100 g, Rfl: 6    Aneta 0.35 MG tablet, Take 1 tablet (0.35 mg total) by mouth daily, Disp: 84 tablet, Rfl: 0    folic acid (FOLVITE) 1 mg tablet, Take 1 tablet (1 mg total) by mouth daily, Disp: 90 tablet, Rfl: 1    hydroxychloroquine (PLAQUENIL) 200 mg tablet, Take 1.5 tablets (300 mg total) by mouth in the morning, Disp: 135 tablet, Rfl: 1    methotrexate 2.5 MG tablet, 6 tablet po weekly (take all 6 tablets on the same day every week), Disp: 90 tablet, Rfl: 1    Multiple Vitamin (MULTIVITAMIN) tablet, Take 1 tablet by mouth daily, Disp: , Rfl:     omeprazole (PriLOSEC) 20 mg delayed release capsule, Take 1 capsule (20 mg total) by mouth daily, Disp: 90 capsule, Rfl: 0    tiotropium (Spiriva Respimat) 1.25 MCG/ACT AERS inhaler, Inhale 2 puffs daily, Disp: 4 g, Rfl: 11    Current Allergies     Allergies as of 10/28/2024 - Reviewed 10/28/2024   Allergen Reaction Noted    Gluten meal - food allergy  01/16/2019            The following portions of the patient's history were reviewed and updated as appropriate: allergies, current medications, past family history, past medical history, past social history, past surgical history and problem list.     Past Medical History:   Diagnosis Date    Anxiety     Asthma December 2022    Depression     Genetic screening 10/2024    negative for 72 genes including BRCA    Lupus 2024    Miscarriage 2011    Pilonidal cyst with abscess     Seasonal allergies     Varicella     Wears glasses        Past  Surgical History:   Procedure Laterality Date    CARDIAC CATHETERIZATION  8/29/2022    Procedure: Cardiac catheterization;  Surgeon: Jethro Hood MD;  [facility];  Service: Cardiology    CARDIAC CATHETERIZATION N/A 8/29/2022    Procedure: Cardiac Coronary Angiogram;  Surgeon: Jethro Hood MD;  [facility];  Service: Cardiology    CHOLECYSTECTOMY  08/2010    Laparoscopic    COLONOSCOPY N/A 5/9/2018    Procedure: COLONOSCOPY;  Surgeon: Zaid Dunlap MD;  [facility];  Service: Gastroenterology    NV EXCISION PILONIDAL CYST/SINUS SIMPLE N/A 4/28/2016    Procedure: EXCISION PILONIDAL CYST;  Surgeon: Neena Levine MD;  [facility];  Service: General    NV LAPAROSCOPY W/RMVL ADNEXAL STRUCTURES Bilateral 10/27/2016    Procedure: SALPINGECTOMY;  Surgeon: Elly Brothers MD;  [facility];  Service: Gynecology    SALPINGECTOMY      WISDOM TOOTH EXTRACTION  01/2016    right upper only       Family History   Problem Relation Age of Onset    Seizures Mother     Other Mother         Epilepsy    Mental illness Mother     Dementia Mother     Schizophrenia Mother     Suicide Attempts Mother     Dysrhythmia Father     Hypertension Father     Stroke Father     Other Father         Cardiac disorder, cardiac pacemaker     Heart disease Father     Coronary artery disease Paternal Grandfather     Cancer Family     Heart disease Brother        Medications have been verified.    Objective     /82   Pulse 102   Temp 99.7 °F (37.6 °C)   Resp 22   SpO2 99%   No LMP recorded.     Physical Exam     Physical Exam  Vitals and nursing note reviewed.   Constitutional:       General: She is not in acute distress.     Appearance: Normal appearance. She is normal weight. She is not ill-appearing, toxic-appearing or diaphoretic.   HENT:      Head: Normocephalic and atraumatic.      Right Ear: Tympanic membrane, ear canal and external ear normal. There is no impacted  cerumen.      Left Ear: Tympanic membrane, ear canal and external ear normal. There is no impacted cerumen.      Nose: Congestion and rhinorrhea present.      Right Sinus: Maxillary sinus tenderness and frontal sinus tenderness present.      Left Sinus: Maxillary sinus tenderness and frontal sinus tenderness present.      Mouth/Throat:      Mouth: Mucous membranes are moist.      Pharynx: Posterior oropharyngeal erythema present. No oropharyngeal exudate.   Eyes:      General: No scleral icterus.        Right eye: No discharge.         Left eye: No discharge.      Conjunctiva/sclera: Conjunctivae normal.   Cardiovascular:      Rate and Rhythm: Normal rate and regular rhythm.      Pulses: Normal pulses.      Heart sounds: Normal heart sounds. No murmur heard.     No friction rub. No gallop.   Pulmonary:      Effort: Pulmonary effort is normal. No respiratory distress.      Breath sounds: Normal breath sounds. No stridor. No wheezing, rhonchi or rales.   Chest:      Chest wall: No tenderness.   Musculoskeletal:         General: Normal range of motion.      Cervical back: Normal range of motion and neck supple. No rigidity or tenderness.   Lymphadenopathy:      Cervical: No cervical adenopathy.   Skin:     General: Skin is warm and dry.      Capillary Refill: Capillary refill takes less than 2 seconds.      Coloration: Skin is not jaundiced.      Findings: No bruising or rash.   Neurological:      General: No focal deficit present.      Mental Status: She is alert. Mental status is at baseline.   Psychiatric:         Mood and Affect: Mood normal.         Behavior: Behavior normal.

## 2024-10-28 NOTE — PATIENT INSTRUCTIONS
"Patient Education     Sinusitis in adults   The Basics   Written by the doctors and editors at [facility]   What is sinusitis? -- Sinusitis is a condition that can cause a stuffy nose, pain in the face, and discharge or \"mucus\" from the nose.  The sinuses are hollow areas in the bones of the face (figure 1). They have a thin lining that normally makes a small amount of mucus. When this lining gets irritated or infected, it swells and makes extra mucus. This causes symptoms.  Sinusitis usually happens after a person gets sick with a cold. The germs causing the cold can infect the sinuses, too. Sometimes, other germs can be the cause of the infection. Often, a person feels like their cold is getting better. But then, they get sinusitis and begin to feel sick again.  What are the symptoms of sinusitis? -- Common symptoms of sinusitis include:   Stuffy or blocked nose   Thick white, yellow, or green discharge from the nose   Pain in the teeth   Pain or pressure in the face - This often feels worse when a person bends forward.  People with sinusitis can also have other symptoms, such as:   Fever   Cough   Trouble smelling   Ear pressure or fullness   Headache   Bad breath   Feeling tired  Most of the time, symptoms start to improve in 7 to 10 days.  Should I see a doctor or nurse? -- See your doctor or nurse if your symptoms last more than 10 days, or if your symptoms first get better but then get worse.  Rarely, sinusitis can lead to serious problems. See your doctor or nurse right away (do not wait 10 days) if you have:   Fever higher than 102°F (38.9°C)   Sudden and severe pain in the face and head   Trouble seeing, or seeing double   Trouble thinking clearly   Swelling or redness around 1 or both eyes   Stiff neck  Is there anything I can do on my own to feel better? -- Yes. To help with your symptoms, you can:   Take an over-the-counter pain reliever to reduce the pain.   Rinse your nose and sinuses with salt water a " "few times a day - Ask your doctor or nurse about the best way to do this.   Drink plenty of fluids - Staying hydrated might help to thin the mucus and make it drain more easily.  Your doctor might also recommend a steroid nose spray to reduce the swelling in your nose, especially if you have allergies. Talk to your doctor if you are thinking of using a steroid spray.  How is sinusitis treated? -- Most of the time, sinusitis does not need to be treated with antibiotic medicines. This is because most sinusitis is caused by viruses, not bacteria, and antibiotics do not kill viruses. In fact, even sinusitis caused by bacteria will usually get better on its own without antibiotics.  Some people with sinusitis do need treatment with antibiotics. If your symptoms have not improved after 10 days, ask your doctor if you should take antibiotics. They might recommend that you wait 1 more week to see if your symptoms improve. But if you have symptoms such as a fever or a lot of pain, they might prescribe antibiotics. If you do get antibiotics, follow all of your doctor's instructions about taking them.  What if my symptoms do not get better? -- If your symptoms do not get better, talk with your doctor or nurse. They might order tests to figure out why you still have symptoms. These can include:   CT scan or other imaging tests - Imaging tests create pictures of the inside of the body.   A test to look inside the sinuses - For this test, a doctor puts a thin tube with a camera on the end into the nose and up into the sinuses.  Some people get a lot of sinus infections or have symptoms that last at least 3 months. These people can have a different type of sinusitis called \"chronic sinusitis.\" Chronic sinusitis can be caused by different things. For example, some people have growths inside their nose or sinuses that are called \"polyps.\" Other people have allergies that cause their symptoms.  Chronic sinusitis can be treated in " different ways. If you have chronic sinusitis, talk with your doctor about which treatments are right for you.  All topics are updated as new evidence becomes available and our peer review process is complete.  This topic retrieved from [employer] on: Feb 28, 2024.  Topic 76922 Version 21.0  Release: 32.2.4 - C32.58  © 2024 UpToDate, Inc. and/or its affiliates. All rights reserved.  figure 1: Sinuses of the face     The sinuses are hollow areas in the bones of the face. This drawing shows where the sinuses are, from the side and front views. There are 4 pairs of sinuses, named for the bones around them: sphenoid, frontal, ethmoid, and maxillary.  Graphic 335348 Version 3.0  Consumer Information Use and Disclaimer   Disclaimer: This generalized information is a limited summary of diagnosis, treatment, and/or medication information. It is not meant to be comprehensive and should be used as a tool to help the user understand and/or assess potential diagnostic and treatment options. It does NOT include all information about conditions, treatments, medications, side effects, or risks that may apply to a specific patient. It is not intended to be medical advice or a substitute for the medical advice, diagnosis, or treatment of a health care provider based on the health care provider's examination and assessment of a patient's specific and unique circumstances. Patients must speak with a health care provider for complete information about their health, medical questions, and treatment options, including any risks or benefits regarding use of medications. This information does not endorse any treatments or medications as safe, effective, or approved for treating a specific patient. UpToDate, Inc. and its affiliates disclaim any warranty or liability relating to this information or the use thereof.The use of this information is governed by the Terms of Use, available at  https://www.[employer].com/en/know/clinical-effectiveness-terms. 2024© [employer] and its affiliates and/or licensors. All rights reserved.  Copyright   © 2024 [employer] and/or its affiliates. All rights reserved.

## 2024-11-28 DIAGNOSIS — M19.90 INFLAMMATORY ARTHRITIS: Primary | ICD-10-CM

## 2024-11-28 RX ORDER — PREDNISONE 10 MG/1
TABLET ORAL
Qty: 21 TABLET | Refills: 0 | Status: SHIPPED | OUTPATIENT
Start: 2024-11-28 | End: 2024-12-12

## 2024-12-03 ENCOUNTER — TELEPHONE (OUTPATIENT)
Dept: PULMONOLOGY | Facility: CLINIC | Age: 34
End: 2024-12-03

## 2024-12-03 NOTE — TELEPHONE ENCOUNTER
RECALL LIST- 6M FU AROUND 3/20/2025     LVM for patient in regards to scheduling a 6m fu with  in the [place] office.

## 2024-12-10 ENCOUNTER — APPOINTMENT (OUTPATIENT)
Dept: LAB | Facility: MEDICAL CENTER | Age: 34
End: 2024-12-10
Payer: COMMERCIAL

## 2024-12-10 DIAGNOSIS — IMO0002 LUPUS: ICD-10-CM

## 2024-12-10 DIAGNOSIS — Z79.899 HIGH RISK MEDICATION USE: ICD-10-CM

## 2024-12-10 DIAGNOSIS — M32.9 SYSTEMIC LUPUS ERYTHEMATOSUS, UNSPECIFIED SLE TYPE, UNSPECIFIED ORGAN INVOLVEMENT STATUS (HCC): Primary | ICD-10-CM

## 2024-12-10 DIAGNOSIS — M19.90 INFLAMMATORY ARTHRITIS: ICD-10-CM

## 2024-12-10 DIAGNOSIS — M32.9 SYSTEMIC LUPUS ERYTHEMATOSUS, UNSPECIFIED SLE TYPE, UNSPECIFIED ORGAN INVOLVEMENT STATUS (HCC): ICD-10-CM

## 2024-12-10 LAB
ALBUMIN SERPL BCG-MCNC: 4.6 G/DL (ref 3.5–5)
ALP SERPL-CCNC: 30 U/L (ref 34–104)
ALT SERPL W P-5'-P-CCNC: 15 U/L (ref 7–52)
ANION GAP SERPL CALCULATED.3IONS-SCNC: 8 MMOL/L (ref 4–13)
AST SERPL W P-5'-P-CCNC: 16 U/L (ref 13–39)
BACTERIA UR QL AUTO: ABNORMAL /HPF
BASOPHILS # BLD AUTO: 0.05 THOUSANDS/ÂΜL (ref 0–0.1)
BASOPHILS NFR BLD AUTO: 1 % (ref 0–1)
BILIRUB SERPL-MCNC: 0.44 MG/DL (ref 0.2–1)
BILIRUB UR QL STRIP: NEGATIVE
BUN SERPL-MCNC: 19 MG/DL (ref 5–25)
C3 SERPL-MCNC: 94 MG/DL (ref 87–200)
C4 SERPL-MCNC: 22 MG/DL (ref 19–52)
CALCIUM SERPL-MCNC: 9 MG/DL (ref 8.4–10.2)
CHLORIDE SERPL-SCNC: 102 MMOL/L (ref 96–108)
CLARITY UR: CLEAR
CO2 SERPL-SCNC: 30 MMOL/L (ref 21–32)
COLOR UR: ABNORMAL
CREAT SERPL-MCNC: 0.64 MG/DL (ref 0.6–1.3)
CREAT UR-MCNC: 185.3 MG/DL
CRP SERPL QL: <1 MG/L
EOSINOPHIL # BLD AUTO: 0.21 THOUSAND/ÂΜL (ref 0–0.61)
EOSINOPHIL NFR BLD AUTO: 4 % (ref 0–6)
ERYTHROCYTE [DISTWIDTH] IN BLOOD BY AUTOMATED COUNT: 13.4 % (ref 11.6–15.1)
ERYTHROCYTE [SEDIMENTATION RATE] IN BLOOD: <1 MM/HOUR (ref 0–19)
GFR SERPL CREATININE-BSD FRML MDRD: 116 ML/MIN/1.73SQ M
GLUCOSE P FAST SERPL-MCNC: 93 MG/DL (ref 65–99)
GLUCOSE UR STRIP-MCNC: NEGATIVE MG/DL
HBV CORE AB SER QL: NORMAL
HBV CORE IGM SER QL: NORMAL
HBV SURFACE AG SER QL: NORMAL
HCT VFR BLD AUTO: 47 % (ref 34.8–46.1)
HCV AB SER QL: NORMAL
HGB BLD-MCNC: 15.2 G/DL (ref 11.5–15.4)
HGB UR QL STRIP.AUTO: NEGATIVE
IMM GRANULOCYTES # BLD AUTO: 0.01 THOUSAND/UL (ref 0–0.2)
IMM GRANULOCYTES NFR BLD AUTO: 0 % (ref 0–2)
KETONES UR STRIP-MCNC: NEGATIVE MG/DL
LEUKOCYTE ESTERASE UR QL STRIP: NEGATIVE
LYMPHOCYTES # BLD AUTO: 1.95 THOUSANDS/ÂΜL (ref 0.6–4.47)
LYMPHOCYTES NFR BLD AUTO: 36 % (ref 14–44)
MCH RBC QN AUTO: 30.4 PG (ref 26.8–34.3)
MCHC RBC AUTO-ENTMCNC: 32.3 G/DL (ref 31.4–37.4)
MCV RBC AUTO: 94 FL (ref 82–98)
MONOCYTES # BLD AUTO: 0.51 THOUSAND/ÂΜL (ref 0.17–1.22)
MONOCYTES NFR BLD AUTO: 9 % (ref 4–12)
MUCOUS THREADS UR QL AUTO: ABNORMAL
NEUTROPHILS # BLD AUTO: 2.75 THOUSANDS/ÂΜL (ref 1.85–7.62)
NEUTS SEG NFR BLD AUTO: 50 % (ref 43–75)
NITRITE UR QL STRIP: NEGATIVE
NON-SQ EPI CELLS URNS QL MICRO: ABNORMAL /HPF
NRBC BLD AUTO-RTO: 0 /100 WBCS
PH UR STRIP.AUTO: 6.5 [PH]
PLATELET # BLD AUTO: 226 THOUSANDS/UL (ref 149–390)
PMV BLD AUTO: 8.8 FL (ref 8.9–12.7)
POTASSIUM SERPL-SCNC: 4.2 MMOL/L (ref 3.5–5.3)
PROT SERPL-MCNC: 6.6 G/DL (ref 6.4–8.4)
PROT UR STRIP-MCNC: ABNORMAL MG/DL
PROT UR-MCNC: 13.5 MG/DL
PROT/CREAT UR: 0.1 MG/G{CREAT} (ref 0–0.1)
RBC # BLD AUTO: 5 MILLION/UL (ref 3.81–5.12)
RBC #/AREA URNS AUTO: ABNORMAL /HPF
SODIUM SERPL-SCNC: 140 MMOL/L (ref 135–147)
SP GR UR STRIP.AUTO: 1.02 (ref 1–1.03)
UROBILINOGEN UR STRIP-ACNC: <2 MG/DL
WBC # BLD AUTO: 5.48 THOUSAND/UL (ref 4.31–10.16)
WBC #/AREA URNS AUTO: ABNORMAL /HPF

## 2024-12-10 PROCEDURE — 36415 COLL VENOUS BLD VENIPUNCTURE: CPT | Performed by: INTERNAL MEDICINE

## 2024-12-10 PROCEDURE — 86160 COMPLEMENT ANTIGEN: CPT | Performed by: INTERNAL MEDICINE

## 2024-12-10 PROCEDURE — 85652 RBC SED RATE AUTOMATED: CPT | Performed by: INTERNAL MEDICINE

## 2024-12-10 PROCEDURE — 86225 DNA ANTIBODY NATIVE: CPT | Performed by: INTERNAL MEDICINE

## 2024-12-10 PROCEDURE — 86140 C-REACTIVE PROTEIN: CPT | Performed by: INTERNAL MEDICINE

## 2024-12-10 PROCEDURE — 84156 ASSAY OF PROTEIN URINE: CPT

## 2024-12-10 PROCEDURE — 86803 HEPATITIS C AB TEST: CPT | Performed by: INTERNAL MEDICINE

## 2024-12-10 PROCEDURE — 86704 HEP B CORE ANTIBODY TOTAL: CPT | Performed by: INTERNAL MEDICINE

## 2024-12-10 PROCEDURE — 86480 TB TEST CELL IMMUN MEASURE: CPT | Performed by: INTERNAL MEDICINE

## 2024-12-10 PROCEDURE — 86705 HEP B CORE ANTIBODY IGM: CPT | Performed by: INTERNAL MEDICINE

## 2024-12-10 PROCEDURE — 87340 HEPATITIS B SURFACE AG IA: CPT | Performed by: INTERNAL MEDICINE

## 2024-12-10 PROCEDURE — 81001 URINALYSIS AUTO W/SCOPE: CPT

## 2024-12-10 PROCEDURE — 82570 ASSAY OF URINE CREATININE: CPT

## 2024-12-10 PROCEDURE — 80053 COMPREHEN METABOLIC PANEL: CPT | Performed by: INTERNAL MEDICINE

## 2024-12-10 PROCEDURE — 85025 COMPLETE CBC W/AUTO DIFF WBC: CPT | Performed by: INTERNAL MEDICINE

## 2024-12-11 LAB
DSDNA AB SER-ACNC: 7 IU/ML (ref 0–9)
GAMMA INTERFERON BACKGROUND BLD IA-ACNC: 0.02 IU/ML
M TB IFN-G BLD-IMP: NEGATIVE
M TB IFN-G CD4+ BCKGRND COR BLD-ACNC: 0.14 IU/ML
M TB IFN-G CD4+ BCKGRND COR BLD-ACNC: 0.16 IU/ML
MITOGEN IGNF BCKGRD COR BLD-ACNC: 9.98 IU/ML

## 2024-12-17 DIAGNOSIS — M19.90 INFLAMMATORY ARTHRITIS: Primary | ICD-10-CM

## 2024-12-17 DIAGNOSIS — M19.90 ARTHRITIS: ICD-10-CM

## 2024-12-17 RX ORDER — METHYLPREDNISOLONE 4 MG/1
TABLET ORAL
Qty: 21 TABLET | Refills: 0 | Status: SHIPPED | OUTPATIENT
Start: 2024-12-17

## 2024-12-17 RX ORDER — CELECOXIB 200 MG/1
200 CAPSULE ORAL 2 TIMES DAILY
Qty: 60 CAPSULE | Refills: 6 | Status: SHIPPED | OUTPATIENT
Start: 2024-12-17

## 2024-12-21 DIAGNOSIS — M32.9 SYSTEMIC LUPUS ERYTHEMATOSUS, UNSPECIFIED SLE TYPE, UNSPECIFIED ORGAN INVOLVEMENT STATUS (HCC): ICD-10-CM

## 2024-12-21 RX ORDER — METHOTREXATE 2.5 MG/1
TABLET ORAL
Qty: 120 TABLET | Refills: 1 | Status: SHIPPED | OUTPATIENT
Start: 2024-12-21

## 2025-01-01 ENCOUNTER — APPOINTMENT (OUTPATIENT)
Dept: RADIOLOGY | Facility: MEDICAL CENTER | Age: 35
End: 2025-01-01
Payer: COMMERCIAL

## 2025-01-01 ENCOUNTER — OFFICE VISIT (OUTPATIENT)
Dept: URGENT CARE | Facility: MEDICAL CENTER | Age: 35
End: 2025-01-01
Payer: COMMERCIAL

## 2025-01-01 VITALS
DIASTOLIC BLOOD PRESSURE: 89 MMHG | TEMPERATURE: 99 F | OXYGEN SATURATION: 98 % | RESPIRATION RATE: 22 BRPM | HEART RATE: 114 BPM | SYSTOLIC BLOOD PRESSURE: 136 MMHG

## 2025-01-01 DIAGNOSIS — J45.901 ASTHMA WITH ACUTE EXACERBATION, UNSPECIFIED ASTHMA SEVERITY, UNSPECIFIED WHETHER PERSISTENT: Primary | ICD-10-CM

## 2025-01-01 DIAGNOSIS — R05.9 COUGH, UNSPECIFIED TYPE: ICD-10-CM

## 2025-01-01 DIAGNOSIS — J22 LOWER RESPIRATORY INFECTION: ICD-10-CM

## 2025-01-01 PROCEDURE — 99214 OFFICE O/P EST MOD 30 MIN: CPT

## 2025-01-01 PROCEDURE — 71046 X-RAY EXAM CHEST 2 VIEWS: CPT

## 2025-01-01 RX ORDER — AZITHROMYCIN 250 MG/1
TABLET, FILM COATED ORAL
Qty: 6 TABLET | Refills: 0 | Status: SHIPPED | OUTPATIENT
Start: 2025-01-01 | End: 2025-01-05

## 2025-01-01 RX ORDER — PREDNISONE 10 MG/1
TABLET ORAL
Qty: 30 TABLET | Refills: 0 | Status: SHIPPED | OUTPATIENT
Start: 2025-01-01

## 2025-01-01 NOTE — PROGRESS NOTES
[facility]        NAME: Laura Christiansen is a 34 y.o. female  : 1990    MRN: [id number]  DATE: 2025  TIME: 12:42 PM    Assessment and Plan   Asthma with acute exacerbation, unspecified asthma severity, unspecified whether persistent [J45.901]  1. Asthma with acute exacerbation, unspecified asthma severity, unspecified whether persistent  predniSONE 10 mg tablet    azithromycin (ZITHROMAX) 250 mg tablet      2. Cough, unspecified type  XR chest pa and lateral      3. Lower respiratory infection  predniSONE 10 mg tablet    azithromycin (ZITHROMAX) 250 mg tablet        CXR: No acute cardiopulmonary disease per preliminary read.  Radiology to determine final read.    Presentation consistent with asthma exacerbation secondary to respiratory infection.  Prescribed course of azithromycin to cover for bacterial cause.  Prescribed course of prednisone, advised to continue with symptomatic treatment/asthma medications.  Strict ER precautions advised.    Patient Instructions     Prescribed course of azithromycin, take as directed.    Prescribed course of prednisone, take as directed.  Avoid use of NSAID medications while taking this.  Continue with asthma medications as prescribed.  Symptomatic treatment as below.  Fever/Pain: Over the counter Tylenol as directed on packaging.  Cough: Mucinex can help to thin mucus, making it easier to cough up. Delsym or Robitussin can help to suppress the cough. Utilizing a vaporizer/humidifier may help, as well as increasing fluids.  Sore Throat: Salt water gargles with 1 teaspoon of salt dissolved in 6-8 oz of water, honey, drinking plenty of liquids, eating soft foods. If severe, can utilize OTC chloraseptic spray.  Nasal Congestion: Over the counter allergy medication like Claritin, Allegra or Zyrtec can help with nasal congestion and post nasal drip.  Over the counter saline or steroid nasal sprays like Flonase can help with nasal congestion and post nasal drip  as well. Over the counter decongestants such as Sudafed may also help.  Upset Stomach: Drink plenty of fluids. May utilize Gatorade, Pedialyte, or other electrolyte solutions to supplement. Eat bland foods (ex: BRAT - bananas, rice, applesauce, toast) and slowly advance to other foods as tolerated.  Please note, if you have heart issues or high blood pressure, the cough/cold medication of choice is Coricidin. Talk to your doctor before trying any new medications.    Follow up with PCP in 3-5 days.  Proceed to  ER if symptoms worsen.    If tests are performed, our office will contact you with results only if changes need to made to the care plan discussed with you at the visit. You can review your full results on [facility] Mychart.    Chief Complaint     Chief Complaint   Patient presents with    Cold Like Symptoms     Has had a head cold for the past 2 weeks and now she feels that it has moved into her chest. She has been having an increase in asthma attacks, she feels that her asthma treatment isn't working.  She reports a productive cough with thick mucous.          History of Present Illness       Patient presents for evaluation of upper respiratory symptoms.  Notes symptoms started about 2 weeks ago, at that point felt just like a head cold.  States symptoms started to improve a few days ago.  Then within the past few days feels it has moved into her chest and she is having exacerbations of her asthma symptoms.  She has been using her asthma treatments, Symbicort, albuterol, and Spiriva, but feels they have not been significantly helpful.  States her  started with symptoms at the same time, he is now feeling better - was seen by his doctor and diagnosed with a viral illness, had negative testing for flu, strep.    URI   This is a new problem. The current episode started 1 to 4 weeks ago. The problem has been gradually worsening. There has been no fever. Associated symptoms include coughing (productive -  blood-tinged, solid green pieces) and wheezing. Pertinent negatives include no abdominal pain, congestion, diarrhea, ear pain, nausea, rash, rhinorrhea, sinus pain, sore throat or vomiting. Treatments tried: Symbicort, albuterol, Spiriva. The treatment provided mild (temporary) relief.       Review of Systems   Review of Systems   Constitutional:  Negative for appetite change, chills and fever (resolved 4 days ago).   HENT:  Positive for postnasal drip. Negative for congestion, ear discharge, ear pain, rhinorrhea, sinus pressure, sinus pain and sore throat.    Eyes:  Negative for pain, discharge, redness and itching.   Respiratory:  Positive for cough (productive - blood-tinged, solid green pieces), chest tightness, shortness of breath and wheezing.    Gastrointestinal:  Negative for abdominal pain, diarrhea, nausea and vomiting.   Musculoskeletal:  Negative for myalgias.   Skin:  Negative for rash.         Current Medications       Current Outpatient Medications:     albuterol (PROVENTIL HFA,VENTOLIN HFA) 90 mcg/act inhaler, Inhale 2 puffs every 4 (four) hours as needed for wheezing, Disp: 18 g, Rfl: 4    azithromycin (ZITHROMAX) 250 mg tablet, Take 2 tablets today then 1 tablet daily x 4 days, Disp: 6 tablet, Rfl: 0    budesonide-formoterol (Symbicort) 160-4.5 mcg/act inhaler, Inhale 2 puffs 2 (two) times a day Rinse mouth after use, Disp: 10.2 g, Rfl: 11    celecoxib (CeleBREX) 200 mg capsule, Take 1 capsule (200 mg total) by mouth 2 (two) times a day, Disp: 60 capsule, Rfl: 6    Cholecalciferol (Vitamin D-3) 125 MCG (5000 UT) TABS, Take by mouth, Disp: , Rfl:     citalopram (CeleXA) 40 mg tablet, , Disp: , Rfl:     Diclofenac Sodium (VOLTAREN) 1 %, Apply 2 g topically 4 (four) times a day, Disp: 100 g, Rfl: 6    Aneta 0.35 MG tablet, Take 1 tablet (0.35 mg total) by mouth daily, Disp: 84 tablet, Rfl: 0    folic acid (FOLVITE) 1 mg tablet, Take 1 tablet (1 mg total) by mouth daily, Disp: 90 tablet, Rfl: 1     hydroxychloroquine (PLAQUENIL) 200 mg tablet, Take 1.5 tablets (300 mg total) by mouth in the morning, Disp: 135 tablet, Rfl: 1    methotrexate 2.5 MG tablet, 8 tablet po weekly (take all 8 tablets on the same day every week), Disp: 120 tablet, Rfl: 1    methylPREDNISolone 4 MG tablet therapy pack, Use as directed on package, Disp: 21 tablet, Rfl: 0    Multiple Vitamin (MULTIVITAMIN) tablet, Take 1 tablet by mouth daily, Disp: , Rfl:     omeprazole (PriLOSEC) 20 mg delayed release capsule, Take 1 capsule (20 mg total) by mouth daily, Disp: 90 capsule, Rfl: 0    predniSONE 10 mg tablet, Take 5 tabs daily x 2days, 4 tabs x 2d, 3 tabs x 2d, 2 tabs x 2d, 1 tab x 2d, Disp: 30 tablet, Rfl: 0    tiotropium (Spiriva Respimat) 1.25 MCG/ACT AERS inhaler, Inhale 2 puffs daily, Disp: 4 g, Rfl: 11    Current Allergies     Allergies as of 01/01/2025 - Reviewed 01/01/2025   Allergen Reaction Noted    Gluten meal - food allergy  01/16/2019            The following portions of the patient's history were reviewed and updated as appropriate: allergies, current medications, past family history, past medical history, past social history, past surgical history and problem list.     Past Medical History:   Diagnosis Date    Anxiety     Asthma December 2022    Depression     Genetic screening 10/2024    negative for 72 genes including BRCA    Lupus 2024    Miscarriage 2011    Pilonidal cyst with abscess     Seasonal allergies     Varicella     Wears glasses        Past Surgical History:   Procedure Laterality Date    CARDIAC CATHETERIZATION  8/29/2022    Procedure: Cardiac catheterization;  Surgeon: Jethro Hood MD;  Location: [facility];  Service: Cardiology    CARDIAC CATHETERIZATION N/A 8/29/2022    Procedure: Cardiac Coronary Angiogram;  Surgeon: Jethro Hood MD;  Location: [facility];  Service: Cardiology    CHOLECYSTECTOMY  08/2010    Laparoscopic    COLONOSCOPY N/A 5/9/2018    Procedure: COLONOSCOPY;  Surgeon:  Zaid Dunlap MD;  Location: [facility];  Service: Gastroenterology    AK EXCISION PILONIDAL CYST/SINUS SIMPLE N/A 4/28/2016    Procedure: EXCISION PILONIDAL CYST;  Surgeon: Neena Levine MD;  Location: [facility];  Service: General    AK LAPAROSCOPY W/RMVL ADNEXAL STRUCTURES Bilateral 10/27/2016    Procedure: SALPINGECTOMY;  Surgeon: Elly Brothers MD;  Location: [facility];  Service: Gynecology    SALPINGECTOMY      WISDOM TOOTH EXTRACTION  01/2016    right upper only       Family History   Problem Relation Age of Onset    Seizures Mother     Other Mother         Epilepsy    Mental illness Mother     Dementia Mother     Schizophrenia Mother     Suicide Attempts Mother     Dysrhythmia Father     Hypertension Father     Stroke Father     Other Father         Cardiac disorder, cardiac pacemaker     Heart disease Father     Coronary artery disease Paternal Grandfather     Cancer Family     Heart disease Brother          Medications have been verified.        Objective   /89   Pulse (!) 114   Temp 99 °F (37.2 °C)   Resp 22   SpO2 98%        Physical Exam     Physical Exam  Vitals and nursing note reviewed.   Constitutional:       General: She is not in acute distress.     Appearance: Normal appearance. She is not ill-appearing.   HENT:      Right Ear: Tympanic membrane, ear canal and external ear normal.      Left Ear: Tympanic membrane, ear canal and external ear normal.      Nose: Nose normal. No congestion or rhinorrhea.      Mouth/Throat:      Mouth: Mucous membranes are moist.      Pharynx: No oropharyngeal exudate or posterior oropharyngeal erythema.   Eyes:      General:         Right eye: No discharge.         Left eye: No discharge.      Extraocular Movements: Extraocular movements intact.   Cardiovascular:      Rate and Rhythm: Normal rate and regular rhythm.      Pulses: Normal pulses.      Heart sounds: Normal heart sounds.   Pulmonary:      Effort: Pulmonary effort is normal. No  respiratory distress.      Breath sounds: Wheezing (minimal, lower lung fields) present.   Abdominal:      General: Abdomen is flat. Bowel sounds are normal. There is no distension.      Palpations: Abdomen is soft.      Tenderness: There is no abdominal tenderness. There is no guarding.   Musculoskeletal:      Cervical back: Neck supple.   Lymphadenopathy:      Cervical: No cervical adenopathy.   Skin:     General: Skin is warm and dry.   Neurological:      Mental Status: She is alert.

## 2025-01-02 DIAGNOSIS — N94.6 DYSMENORRHEA: ICD-10-CM

## 2025-01-02 DIAGNOSIS — N92.0 MENORRHAGIA WITH REGULAR CYCLE: ICD-10-CM

## 2025-01-03 RX ORDER — NORETHINDRONE 0.35 MG/1
1 TABLET ORAL DAILY
Qty: 84 TABLET | Refills: 1 | Status: SHIPPED | OUTPATIENT
Start: 2025-01-03

## 2025-02-02 ENCOUNTER — TELEPHONE (OUTPATIENT)
Dept: RHEUMATOLOGY | Facility: CLINIC | Age: 35
End: 2025-02-02

## 2025-02-02 DIAGNOSIS — M32.9 SYSTEMIC LUPUS ERYTHEMATOSUS, UNSPECIFIED SLE TYPE, UNSPECIFIED ORGAN INVOLVEMENT STATUS (HCC): Primary | ICD-10-CM

## 2025-02-02 RX ORDER — BELIMUMAB 200 MG/ML
200 SOLUTION SUBCUTANEOUS WEEKLY
Qty: 4 ML | Refills: 11 | Status: SHIPPED | OUTPATIENT
Start: 2025-02-02 | End: 2025-02-06 | Stop reason: SDUPTHER

## 2025-02-02 NOTE — TELEPHONE ENCOUNTER
Rheumatology Prior Authorization Request      Medication/Disease Information:   Diagnosis:  systemic lupus erythematosus  Medication:  Benlysta (belimumab) 200 mg weekly subcutaneous pen injection  Failed or Intolerant to or Contraindicated:  failing hydroxychloroquine and methotrexate  Screening  Hepatitis B/C: negative  Tuberculosis: negative  No active infections  Up to Date on immunizations    Sending script to [facility]

## 2025-02-03 NOTE — TELEPHONE ENCOUNTER
PA for [employer] SUBMITTED to [employer]    via    []CMM-KEY:    [x]Surescripts-Case ID # [id number]   []Availity-Auth ID #  NDC #    []Faxed to plan   []Other website    []Phone call Case ID #      [x]PA sent as URGENT    All office notes, labs and other pertaining documents and studies sent. Clinical questions answered. Awaiting determination from insurance company.     Turnaround time for your insurance to make a decision on your Prior Authorization can take 7-21 business days.

## 2025-02-04 NOTE — TELEPHONE ENCOUNTER
CAMERON Keyes APPROVED     Date(s) approved February 3, 2025 to August 3, 2025     Case #     Patient advised by          [][facility] Message  []Phone call   [x]LMOM  []L/M to call office as no active Communication consent on file  []Unable to leave detailed message as VM not approved on Communication consent       Pharmacy advised by  Please send to [facility]  []Fax  []Phone call    Approval letter scanned into Media Yes

## 2025-02-06 DIAGNOSIS — M32.9 SYSTEMIC LUPUS ERYTHEMATOSUS, UNSPECIFIED SLE TYPE, UNSPECIFIED ORGAN INVOLVEMENT STATUS (HCC): ICD-10-CM

## 2025-02-06 RX ORDER — BELIMUMAB 200 MG/ML
200 SOLUTION SUBCUTANEOUS WEEKLY
Qty: 4 ML | Refills: 11 | Status: SHIPPED | OUTPATIENT
Start: 2025-02-06 | End: 2025-02-11 | Stop reason: SDUPTHER

## 2025-02-11 DIAGNOSIS — M32.9 SYSTEMIC LUPUS ERYTHEMATOSUS, UNSPECIFIED SLE TYPE, UNSPECIFIED ORGAN INVOLVEMENT STATUS (HCC): ICD-10-CM

## 2025-02-11 RX ORDER — BELIMUMAB 200 MG/ML
200 SOLUTION SUBCUTANEOUS WEEKLY
Qty: 4 ML | Refills: 11 | Status: SHIPPED | OUTPATIENT
Start: 2025-02-11

## 2025-02-15 ENCOUNTER — PATIENT MESSAGE (OUTPATIENT)
Dept: RHEUMATOLOGY | Facility: CLINIC | Age: 35
End: 2025-02-15

## 2025-02-19 ENCOUNTER — OFFICE VISIT (OUTPATIENT)
Dept: FAMILY MEDICINE CLINIC | Facility: CLINIC | Age: 35
End: 2025-02-19
Payer: COMMERCIAL

## 2025-02-19 VITALS
BODY MASS INDEX: 26.56 KG/M2 | HEIGHT: 65 IN | OXYGEN SATURATION: 99 % | TEMPERATURE: 98.3 F | WEIGHT: 159.4 LBS | DIASTOLIC BLOOD PRESSURE: 66 MMHG | HEART RATE: 88 BPM | SYSTOLIC BLOOD PRESSURE: 120 MMHG

## 2025-02-19 DIAGNOSIS — L30.9 ECZEMA OF BOTH HANDS: Primary | ICD-10-CM

## 2025-02-19 DIAGNOSIS — L03.012 CELLULITIS OF FINGER OF LEFT HAND: ICD-10-CM

## 2025-02-19 DIAGNOSIS — L03.011 CELLULITIS OF FINGER OF RIGHT HAND: ICD-10-CM

## 2025-02-19 PROCEDURE — 99213 OFFICE O/P EST LOW 20 MIN: CPT | Performed by: FAMILY MEDICINE

## 2025-02-19 RX ORDER — BETAMETHASONE DIPROPIONATE 0.5 MG/G
CREAM TOPICAL 2 TIMES DAILY
Qty: 30 G | Refills: 1 | Status: SHIPPED | OUTPATIENT
Start: 2025-02-19

## 2025-02-19 RX ORDER — CEPHALEXIN 500 MG/1
500 CAPSULE ORAL EVERY 12 HOURS SCHEDULED
Qty: 14 CAPSULE | Refills: 0 | Status: SHIPPED | OUTPATIENT
Start: 2025-02-19 | End: 2025-02-26

## 2025-02-19 RX ORDER — PREDNISONE 10 MG/1
TABLET ORAL
Qty: 15 TABLET | Refills: 0 | Status: SHIPPED | OUTPATIENT
Start: 2025-02-19

## 2025-02-19 NOTE — PROGRESS NOTES
"Name: Laura Christiansen      : 1990      MRN: [id number]  Encounter Provider: Ngozi Carter DO  Encounter Date: 2025   Encounter department: [facility]  :  Assessment & Plan  Eczema of both hands    Orders:  •  predniSONE 10 mg tablet; Take 5 tabs daily x 1days, 4 tabs x 1d, 3 tabs x 1d, 2 tabs x 1d, 1 tab x 1d  •  betamethasone, augmented, (DIPROLENE-AF) 0.05 % cream; Apply topically 2 (two) times a day sparingly    Cellulitis of finger of right hand    Orders:  •  cephalexin (KEFLEX) 500 mg capsule; Take 1 capsule (500 mg total) by mouth every 12 (twelve) hours for 7 days    Cellulitis of finger of left hand    Orders:  •  cephalexin (KEFLEX) 500 mg capsule; Take 1 capsule (500 mg total) by mouth every 12 (twelve) hours for 7 days           History of Present Illness   Patient presents with:  Skin Irritation: Pt complains of skin irritation between fingers on both hands    Deep fissures in between fingers on both hands x 2 weeks. Skin is red. Fissures are bleeding.      Review of Systems   Skin:  Positive for color change and rash.       Objective   /66 (BP Location: Left arm, Patient Position: Sitting, Cuff Size: Adult)   Pulse 88   Temp 98.3 °F (36.8 °C) (Temporal)   Ht 5' 5\" (1.651 m)   Wt 72.3 kg (159 lb 6.4 oz)   SpO2 99%   BMI 26.53 kg/m²      Physical Exam  Vitals and nursing note reviewed.   Constitutional:       General: She is not in acute distress.  HENT:      Head: Normocephalic.   Skin:     General: Skin is warm and dry.      Findings: Erythema and rash present.   Neurological:      Mental Status: She is alert and oriented to person, place, and time.   Psychiatric:         Mood and Affect: Mood normal.         "

## 2025-02-19 NOTE — ASSESSMENT & PLAN NOTE
Orders:  •  predniSONE 10 mg tablet; Take 5 tabs daily x 1days, 4 tabs x 1d, 3 tabs x 1d, 2 tabs x 1d, 1 tab x 1d

## 2025-03-16 DIAGNOSIS — IMO0002 LUPUS: ICD-10-CM

## 2025-03-17 RX ORDER — HYDROXYCHLOROQUINE SULFATE 200 MG/1
300 TABLET, FILM COATED ORAL DAILY
Qty: 135 TABLET | Refills: 1 | Status: SHIPPED | OUTPATIENT
Start: 2025-03-17 | End: 2025-09-13

## 2025-03-18 ENCOUNTER — APPOINTMENT (OUTPATIENT)
Dept: LAB | Facility: MEDICAL CENTER | Age: 35
End: 2025-03-18
Payer: COMMERCIAL

## 2025-03-18 DIAGNOSIS — Z79.899 HIGH RISK MEDICATION USE: ICD-10-CM

## 2025-03-18 DIAGNOSIS — IMO0002 LUPUS: ICD-10-CM

## 2025-03-18 LAB
ALBUMIN SERPL BCG-MCNC: 4.3 G/DL (ref 3.5–5)
ALP SERPL-CCNC: 73 U/L (ref 34–104)
ALT SERPL W P-5'-P-CCNC: 19 U/L (ref 7–52)
ANION GAP SERPL CALCULATED.3IONS-SCNC: 8 MMOL/L (ref 4–13)
AST SERPL W P-5'-P-CCNC: 18 U/L (ref 13–39)
BASOPHILS # BLD AUTO: 0.09 THOUSANDS/ÂΜL (ref 0–0.1)
BASOPHILS NFR BLD AUTO: 2 % (ref 0–1)
BILIRUB SERPL-MCNC: 0.2 MG/DL (ref 0.2–1)
BUN SERPL-MCNC: 19 MG/DL (ref 5–25)
CALCIUM SERPL-MCNC: 8.4 MG/DL (ref 8.4–10.2)
CHLORIDE SERPL-SCNC: 106 MMOL/L (ref 96–108)
CO2 SERPL-SCNC: 26 MMOL/L (ref 21–32)
CREAT SERPL-MCNC: 0.59 MG/DL (ref 0.6–1.3)
CRP SERPL QL: <1 MG/L
EOSINOPHIL # BLD AUTO: 0.51 THOUSAND/ÂΜL (ref 0–0.61)
EOSINOPHIL NFR BLD AUTO: 9 % (ref 0–6)
ERYTHROCYTE [DISTWIDTH] IN BLOOD BY AUTOMATED COUNT: 12.6 % (ref 11.6–15.1)
ERYTHROCYTE [SEDIMENTATION RATE] IN BLOOD: <1 MM/HOUR (ref 0–19)
GFR SERPL CREATININE-BSD FRML MDRD: 119 ML/MIN/1.73SQ M
GLUCOSE P FAST SERPL-MCNC: 106 MG/DL (ref 65–99)
HCT VFR BLD AUTO: 43.7 % (ref 34.8–46.1)
HGB BLD-MCNC: 14.7 G/DL (ref 11.5–15.4)
IMM GRANULOCYTES # BLD AUTO: 0.02 THOUSAND/UL (ref 0–0.2)
IMM GRANULOCYTES NFR BLD AUTO: 0 % (ref 0–2)
LYMPHOCYTES # BLD AUTO: 2.08 THOUSANDS/ÂΜL (ref 0.6–4.47)
LYMPHOCYTES NFR BLD AUTO: 37 % (ref 14–44)
MCH RBC QN AUTO: 31.2 PG (ref 26.8–34.3)
MCHC RBC AUTO-ENTMCNC: 33.6 G/DL (ref 31.4–37.4)
MCV RBC AUTO: 93 FL (ref 82–98)
MONOCYTES # BLD AUTO: 0.59 THOUSAND/ÂΜL (ref 0.17–1.22)
MONOCYTES NFR BLD AUTO: 10 % (ref 4–12)
NEUTROPHILS # BLD AUTO: 2.37 THOUSANDS/ÂΜL (ref 1.85–7.62)
NEUTS SEG NFR BLD AUTO: 42 % (ref 43–75)
NRBC BLD AUTO-RTO: 0 /100 WBCS
PLATELET # BLD AUTO: 274 THOUSANDS/UL (ref 149–390)
PMV BLD AUTO: 9 FL (ref 8.9–12.7)
POTASSIUM SERPL-SCNC: 4.5 MMOL/L (ref 3.5–5.3)
PROT SERPL-MCNC: 6.4 G/DL (ref 6.4–8.4)
RBC # BLD AUTO: 4.71 MILLION/UL (ref 3.81–5.12)
SODIUM SERPL-SCNC: 140 MMOL/L (ref 135–147)
WBC # BLD AUTO: 5.66 THOUSAND/UL (ref 4.31–10.16)

## 2025-03-18 PROCEDURE — 85652 RBC SED RATE AUTOMATED: CPT

## 2025-03-18 PROCEDURE — 86140 C-REACTIVE PROTEIN: CPT

## 2025-03-18 PROCEDURE — 80053 COMPREHEN METABOLIC PANEL: CPT

## 2025-03-18 PROCEDURE — 85025 COMPLETE CBC W/AUTO DIFF WBC: CPT

## 2025-03-18 PROCEDURE — 36415 COLL VENOUS BLD VENIPUNCTURE: CPT

## 2025-03-19 ENCOUNTER — APPOINTMENT (OUTPATIENT)
Dept: LAB | Facility: MEDICAL CENTER | Age: 35
End: 2025-03-19
Payer: COMMERCIAL

## 2025-03-19 ENCOUNTER — NURSE TRIAGE (OUTPATIENT)
Age: 35
End: 2025-03-19

## 2025-03-19 DIAGNOSIS — M32.9 SLE (SYSTEMIC LUPUS ERYTHEMATOSUS RELATED SYNDROME) (HCC): Primary | ICD-10-CM

## 2025-03-19 DIAGNOSIS — M32.9 SLE (SYSTEMIC LUPUS ERYTHEMATOSUS RELATED SYNDROME) (HCC): ICD-10-CM

## 2025-03-19 LAB
BACTERIA UR QL AUTO: NORMAL /HPF
BILIRUB UR QL STRIP: NEGATIVE
C3 SERPL-MCNC: 101 MG/DL (ref 87–200)
C4 SERPL-MCNC: 25 MG/DL (ref 19–52)
CLARITY UR: CLEAR
COLOR UR: ABNORMAL
CREAT UR-MCNC: 96.8 MG/DL
GLUCOSE UR STRIP-MCNC: NEGATIVE MG/DL
HGB UR QL STRIP.AUTO: NEGATIVE
KETONES UR STRIP-MCNC: NEGATIVE MG/DL
LEUKOCYTE ESTERASE UR QL STRIP: NEGATIVE
NITRITE UR QL STRIP: NEGATIVE
NON-SQ EPI CELLS URNS QL MICRO: NORMAL /HPF
PH UR STRIP.AUTO: 6.5 [PH]
PROT UR STRIP-MCNC: ABNORMAL MG/DL
PROT UR-MCNC: 10.5 MG/DL
PROT/CREAT UR: 0.1 MG/G{CREAT} (ref 0–0.1)
RBC #/AREA URNS AUTO: NORMAL /HPF
SP GR UR STRIP.AUTO: 1.02 (ref 1–1.03)
UROBILINOGEN UR STRIP-ACNC: <2 MG/DL
WBC #/AREA URNS AUTO: NORMAL /HPF

## 2025-03-19 PROCEDURE — 82570 ASSAY OF URINE CREATININE: CPT

## 2025-03-19 PROCEDURE — 84156 ASSAY OF PROTEIN URINE: CPT

## 2025-03-19 PROCEDURE — 86225 DNA ANTIBODY NATIVE: CPT

## 2025-03-19 PROCEDURE — 81001 URINALYSIS AUTO W/SCOPE: CPT

## 2025-03-19 PROCEDURE — 36415 COLL VENOUS BLD VENIPUNCTURE: CPT

## 2025-03-19 PROCEDURE — 86160 COMPLEMENT ANTIGEN: CPT

## 2025-03-19 NOTE — TELEPHONE ENCOUNTER
FOLLOW UP: 07/09/25 Gerard    REASON FOR CONVERSATION: Lupus Flare    SYMPTOMS: Started approx 7 days ago, generalized joint pain/swollen, Hands worse - L Hand worse though, Bilateral Knees, Severe fatigue despite sleep, increasing hair loss, difficult to do ADL's. Advised per Lupus protocol.    OTHER: Pt had CBC, CMP, ESR, CRP, completed yesterday. C3, C4, dsDNA AB, UA, Urine Protein-Creat Ratio ordered per protocol. Pt to get additional labs done today.    DISPOSITION: Callback From Office Today, Order Lab Work

## 2025-03-19 NOTE — TELEPHONE ENCOUNTER
Call #1  LM for pt to call back regarding MyChart Message so we may Triage her S/S.    Converted MyChart Message to telephone Encounter:  Dr. Gerard Gonzalez,       I am writing to let you know the joint pain in both of my hands and knees returned about 7 days ago. I am relying heavily on the 200 mg Celebrex and on Tylenol daily; which makes the pain a little more tolerable. My pain level is consistently above a 3 or 4 even with the pain medications. The pain is not improving and some days it is very difficult to do daily responsibilities like cooking, using a pen to write, or walking up & down the stairs in my home.           I am also losing my hair again and experiencing fatigue despite getting 8-9 hours of sleep nightly.      I did begin the weekly Benlysta injections on February 28th and have not had any side effects from it. I also recently went for the routine 12 weeks blood labs; a few results were abnormal, but I do not know if they were at a concerning level.      As always, thank you so much for all you do for my medical care & quality of life care!      Sincerely,          Laura Christiansen

## 2025-03-19 NOTE — TELEPHONE ENCOUNTER
Reason for Disposition  • Affirmative: Joint swelling    Answer Assessement - Initial Assessment Questions  1. LAST EVALUATION- When did you last see your Rheumatologist?   09/25/24  2. SYMPTOMS- Are you currently experiencing any symptoms of concern? Fever or signs of infection?   Started approx 7 days ago, generalized joint pain/swollen, Hands worse - L Hand worse though, Bilateral Knees, Severe fatigue despite sleep, increasing hair loss, difficult to do ADL's.  3. Are you experiencing any new or sudden onset of headaches or visual changes?    No  4. Are you experiencing any chest pain, shortness of breath or pain with deep breathes?  No  5. Do you currently have a rash consistent with prior lupus rashes?   No  6. Are you experiencing any changes with your urine such as blood present, foamy urine, or changes in the color of your urine?   No  7. Are you having any joint swelling?   Yes  8. Are you currently taking any lupus medications? If yes, what are those medications?  300mg Plaquenil, 200mg Celebrex, Tylenol, Methotrexate 2.5mg 6 pills weekly, 200mg Benlysta weekly, 1mg Folic Acid daily    Protocols used: Rheumatology-Lupus-ADULT-[place]

## 2025-03-22 DIAGNOSIS — M32.9 SLE (SYSTEMIC LUPUS ERYTHEMATOSUS RELATED SYNDROME) (HCC): Primary | ICD-10-CM

## 2025-03-22 RX ORDER — PREDNISONE 5 MG/1
TABLET ORAL
Qty: 70 TABLET | Refills: 0 | Status: SHIPPED | OUTPATIENT
Start: 2025-03-22

## 2025-03-23 LAB — DSDNA IGG SERPL IA-ACNC: 3.3 IU/ML (ref ?–15)

## 2025-04-03 ENCOUNTER — NURSE TRIAGE (OUTPATIENT)
Age: 35
End: 2025-04-03

## 2025-04-03 NOTE — TELEPHONE ENCOUNTER
"FOLLOW UP:   Epic Secure Chat sent to Dr Hayden- On call     REASON FOR CONVERSATION: Pelvic Pain    SYMPTOMS: left sided pelvic pain     OTHER: pt c/o having pelvic pain on the lower left side of pelvis, saying at times its 2/10 but goes up to 8/10 and is \"sharp and severe that I need to sit down and breathe through the pain\". Pt saying that there is a \"tender\" spot there when pressing down.     Pt is advised to go to the ED now for further evaluation,pt saying she will be [facility]     DISPOSITION: Go to ED/[facility] Now (Or to Office with PCP Approval)      Reason for Disposition   Patient sounds very sick or weak to the triager    Answer Assessment - Initial Assessment Questions  1. LOCATION: \"Where does it hurt?\"       Lower left pelvic area  2. RADIATION: \"Does the pain shoot anywhere else?\" (e.g., lower back, groin, thighs)      Pt denies   3. ONSET: \"When did the pain begin?\" (e.g., minutes, hours or days ago)       Began yesterdat   4. SUDDEN: \"Gradual or sudden onset?\"      Sudden   5. PATTERN \"Does the pain come and go, or is it constant?\"      Sudden severe pain comes and goes e  6. SEVERITY: \"How bad is the pain?\"  (e.g., Scale 1-10; mild, moderate, or severe)      Pts pain is 2/10 and at times goes to 8/10 sharp severe pain when the pain is stronger   7. RECURRENT SYMPTOM: \"Have you ever had this type of pelvic pain before?\" If Yes, ask: \"When was the last time?\" and \"What happened that time?\"       Pt last had a cyst that resembled this pain   8. CAUSE: \"What do you think is causing the pelvic pain?\"      Pt thinks possible cyst   9. RELIEVING/AGGRAVATING FACTORS: \"What makes it better or worse?\" (e.g., activity/rest, sexual intercourse, voiding, passing stool)      Pt pt denies   10. OTHER SYMPTOMS: \"Has there been any other symptoms?\" (e.g., fever, constipation, diarrhea, urine problems, vaginal bleeding, vaginal discharge, or vomiting?\"        Pt denies fever, constipation, diarrhea, urine problems, " "vaginal bleeding, vaginal discharge, or vomiting  11. PREGNANCY: \"Is there any chance you are pregnant?\" \"When was your last menstrual period?\"        Pt denies pregnancy, LMP 3/12/25    Protocols used: Pelvic Pain - Female-Adult-[place]    "

## 2025-04-14 ENCOUNTER — TELEPHONE (OUTPATIENT)
Dept: PULMONOLOGY | Facility: CLINIC | Age: 35
End: 2025-04-14

## 2025-04-14 NOTE — TELEPHONE ENCOUNTER
PA for SYMBICORT SUBMITTED to [employer]    via    [x]CMM-KEY: [id number]  []Surescripts-Case ID #   []Availity-Auth ID # NDC #   []Faxed to plan   []Other website   []Phone call Case ID #     [x]PA sent as URGENT    All office notes, labs and other pertaining documents and studies sent. Clinical questions answered. Awaiting determination from insurance company.     Turnaround time for your insurance to make a decision on your Prior Authorization can take 7-21 business days.

## 2025-04-16 ENCOUNTER — TELEPHONE (OUTPATIENT)
Age: 35
End: 2025-04-16

## 2025-04-16 DIAGNOSIS — M32.9 SLE (SYSTEMIC LUPUS ERYTHEMATOSUS RELATED SYNDROME) (HCC): Primary | ICD-10-CM

## 2025-04-16 RX ORDER — METHYLPREDNISOLONE 4 MG/1
TABLET ORAL
Qty: 21 TABLET | Refills: 0 | Status: SHIPPED | OUTPATIENT
Start: 2025-04-16

## 2025-04-16 NOTE — TELEPHONE ENCOUNTER
Call from patient, stating that she is returning a call from the nurse this morning. Advised that a script was call in for medrol dose pack and that Dr. Gee did order blood work to be done. Advised that the blood work was non-fasting. No other questions.

## 2025-04-17 ENCOUNTER — APPOINTMENT (OUTPATIENT)
Dept: LAB | Facility: MEDICAL CENTER | Age: 35
End: 2025-04-17
Payer: COMMERCIAL

## 2025-04-17 DIAGNOSIS — M32.9 SLE (SYSTEMIC LUPUS ERYTHEMATOSUS RELATED SYNDROME) (HCC): ICD-10-CM

## 2025-04-17 LAB
CRP SERPL QL: <1 MG/L
ERYTHROCYTE [SEDIMENTATION RATE] IN BLOOD: <1 MM/HOUR (ref 0–19)

## 2025-04-17 PROCEDURE — 85652 RBC SED RATE AUTOMATED: CPT

## 2025-04-17 PROCEDURE — 36415 COLL VENOUS BLD VENIPUNCTURE: CPT

## 2025-04-17 PROCEDURE — 86140 C-REACTIVE PROTEIN: CPT

## 2025-04-23 DIAGNOSIS — M32.9 SLE (SYSTEMIC LUPUS ERYTHEMATOSUS RELATED SYNDROME) (HCC): Primary | ICD-10-CM

## 2025-04-23 RX ORDER — FOLIC ACID 1 MG/1
1000 TABLET ORAL DAILY
Qty: 90 TABLET | Refills: 1 | Status: SHIPPED | OUTPATIENT
Start: 2025-04-23

## 2025-05-12 ENCOUNTER — NURSE TRIAGE (OUTPATIENT)
Age: 35
End: 2025-05-12

## 2025-05-12 DIAGNOSIS — M32.9 SLE (SYSTEMIC LUPUS ERYTHEMATOSUS RELATED SYNDROME) (HCC): Primary | ICD-10-CM

## 2025-05-12 RX ORDER — METHYLPREDNISOLONE 4 MG/1
TABLET ORAL
Qty: 50 TABLET | Refills: 0 | Status: SHIPPED | OUTPATIENT
Start: 2025-05-12

## 2025-05-12 NOTE — TELEPHONE ENCOUNTER
"FOLLOW UP: Yes-Call back to the patient with recommendations    REASON FOR CONVERSATION: Generalized Body Aches    SYMPTOMS: Call from patient, stating she is having a lupus flare for the last 7 days, starting May 7. All joints involved but worse in hands and wrists, secondarily in knees and toes. Pain is aching and constant. Hands and wrists are 5-8/10; Knees and toes are 2-4/10. Taking Tylenol 1000 mg q6h around the clock as well as celebrex q12h as prescribled. She is not getting relief from anything and is unable to do her day to day activities. Ice helps but causes the joints to become stiff. Heat helps but increases the swelling in her hands and wrists. She also noted that she has a raised rash on her hands and arms which is the color of her sking. It is not red or itchy. She was out in the sun for 30 minutes a few days ago but always wears sunscreen. She wanted Dr. Gee to know this as well.    OTHER: Patient states she is amenable to an appointment if Dr. Gee would like to see her.    DISPOSITION: Send Message to Task Provider Pool (overriding See Within 3 Days in Office)    Reason for Disposition   MODERATE pain (e.g., interferes with normal activities) and present > 3 days    Answer Assessment - Initial Assessment Questions  1. ONSET: \"When did the muscle aches or body pains start?\"       7 days ago on May 5  2. LOCATION: \"What part of your body is hurting?\" (e.g., entire body, arms, legs)       All joints, but more in the hands and wrists, secondary in the knees and toes  3. SEVERITY: \"How bad is the pain?\" (Scale 1-10; or mild, moderate, severe)      Both areas are constant and aching  Hands and wrists 5-8/10  Knees and toes 2-4/10    4. CAUSE: \"What do you think is causing the pains?\"      Lupus flare  5. FEVER: \"Have you been having fever?\"      Denies  6. OTHER SYMPTOMS: \"Do you have any other symptoms?\" (e.g., chest pain, weakness, rash, cold or flu symptoms, weight loss)      Difficult to do " "day to day chores  7. PREGNANCY: \"Is there any chance you are pregnant?\" \"When was your last menstrual period?\"      Denies  8. TRAVEL: \"Have you traveled out of the country in the last month?\" (e.g., travel history, exposures)      Denies    Protocols used: Muscle Aches and Body Pain-Adult-[place]    "

## 2025-05-12 NOTE — TELEPHONE ENCOUNTER
Sent Medrol (methylprednisolone) taper, would patient rather have prednisone? Which one does she respond to better?

## 2025-05-20 DIAGNOSIS — M32.9 SLE (SYSTEMIC LUPUS ERYTHEMATOSUS RELATED SYNDROME) (HCC): Primary | ICD-10-CM

## 2025-05-20 RX ORDER — METHYLPREDNISOLONE 8 MG/1
TABLET ORAL
Qty: 20 TABLET | Refills: 1 | Status: SHIPPED | OUTPATIENT
Start: 2025-05-20

## 2025-06-09 ENCOUNTER — TELEPHONE (OUTPATIENT)
Dept: RHEUMATOLOGY | Facility: CLINIC | Age: 35
End: 2025-06-09

## 2025-06-09 DIAGNOSIS — M32.8 OTHER FORMS OF SYSTEMIC LUPUS ERYTHEMATOSUS, UNSPECIFIED ORGAN INVOLVEMENT STATUS (HCC): Primary | ICD-10-CM

## 2025-06-09 RX ORDER — ACETAMINOPHEN 325 MG/1
650 TABLET ORAL ONCE
OUTPATIENT
Start: 2025-06-26

## 2025-06-09 RX ORDER — SODIUM CHLORIDE 9 MG/ML
20 INJECTION, SOLUTION INTRAVENOUS ONCE
OUTPATIENT
Start: 2025-06-26

## 2025-06-09 RX ORDER — DIPHENHYDRAMINE HCL 25 MG
25 TABLET ORAL ONCE
OUTPATIENT
Start: 2025-06-26

## 2025-06-09 RX ORDER — METHYLPREDNISOLONE SODIUM SUCCINATE 40 MG/ML
40 INJECTION, POWDER, LYOPHILIZED, FOR SOLUTION INTRAMUSCULAR; INTRAVENOUS ONCE
OUTPATIENT
Start: 2025-06-26

## 2025-06-09 NOTE — TELEPHONE ENCOUNTER
Rheumatology Infusion Prior Authorization Request      Medication/Disease Information:   Diagnosis:  systemic lupus erythematosus  Medication:  Saphnelo (anifrolumab) 300 mg IV every 4 weeks  Failed or Intolerant to or Contraindicated:  failing hydroxychloroquine, methotrexate, and latest Benlysta  Site of Medication Administration: [facility] on [facility]  Screening  Hepatitis B/C:  negative  Tuberculosis:  negative  No active infections  Up to Date on immunizations    Let me know start date once scheduled so I can change in therapy plan.

## 2025-06-09 NOTE — TELEPHONE ENCOUNTER
Will need medical letter from provider because change was made through [employer] messaging and not office visit.

## 2025-06-10 ENCOUNTER — APPOINTMENT (OUTPATIENT)
Dept: LAB | Facility: CLINIC | Age: 35
End: 2025-06-10
Payer: COMMERCIAL

## 2025-06-10 DIAGNOSIS — IMO0002 LUPUS: ICD-10-CM

## 2025-06-10 DIAGNOSIS — Z79.899 HIGH RISK MEDICATION USE: ICD-10-CM

## 2025-06-10 LAB
ALBUMIN SERPL BCG-MCNC: 4.2 G/DL (ref 3.5–5)
ALP SERPL-CCNC: 43 U/L (ref 34–104)
ALT SERPL W P-5'-P-CCNC: 13 U/L (ref 7–52)
ANION GAP SERPL CALCULATED.3IONS-SCNC: 8 MMOL/L (ref 4–13)
AST SERPL W P-5'-P-CCNC: 15 U/L (ref 13–39)
BASOPHILS # BLD AUTO: 0.06 THOUSANDS/ÂΜL (ref 0–0.1)
BASOPHILS NFR BLD AUTO: 1 % (ref 0–1)
BILIRUB SERPL-MCNC: 0.28 MG/DL (ref 0.2–1)
BUN SERPL-MCNC: 23 MG/DL (ref 5–25)
CALCIUM SERPL-MCNC: 8.7 MG/DL (ref 8.4–10.2)
CHLORIDE SERPL-SCNC: 104 MMOL/L (ref 96–108)
CO2 SERPL-SCNC: 30 MMOL/L (ref 21–32)
CREAT SERPL-MCNC: 0.65 MG/DL (ref 0.6–1.3)
CRP SERPL QL: <1 MG/L
EOSINOPHIL # BLD AUTO: 0.37 THOUSAND/ÂΜL (ref 0–0.61)
EOSINOPHIL NFR BLD AUTO: 8 % (ref 0–6)
ERYTHROCYTE [DISTWIDTH] IN BLOOD BY AUTOMATED COUNT: 13.4 % (ref 11.6–15.1)
ERYTHROCYTE [SEDIMENTATION RATE] IN BLOOD: <1 MM/HOUR (ref 0–19)
GFR SERPL CREATININE-BSD FRML MDRD: 116 ML/MIN/1.73SQ M
GLUCOSE P FAST SERPL-MCNC: 95 MG/DL (ref 65–99)
HCT VFR BLD AUTO: 44.5 % (ref 34.8–46.1)
HGB BLD-MCNC: 14.4 G/DL (ref 11.5–15.4)
IMM GRANULOCYTES # BLD AUTO: 0 THOUSAND/UL (ref 0–0.2)
IMM GRANULOCYTES NFR BLD AUTO: 0 % (ref 0–2)
LYMPHOCYTES # BLD AUTO: 1.77 THOUSANDS/ÂΜL (ref 0.6–4.47)
LYMPHOCYTES NFR BLD AUTO: 36 % (ref 14–44)
MCH RBC QN AUTO: 30.7 PG (ref 26.8–34.3)
MCHC RBC AUTO-ENTMCNC: 32.4 G/DL (ref 31.4–37.4)
MCV RBC AUTO: 95 FL (ref 82–98)
MONOCYTES # BLD AUTO: 0.69 THOUSAND/ÂΜL (ref 0.17–1.22)
MONOCYTES NFR BLD AUTO: 14 % (ref 4–12)
NEUTROPHILS # BLD AUTO: 2.01 THOUSANDS/ÂΜL (ref 1.85–7.62)
NEUTS SEG NFR BLD AUTO: 41 % (ref 43–75)
NRBC BLD AUTO-RTO: 0 /100 WBCS
PLATELET # BLD AUTO: 223 THOUSANDS/UL (ref 149–390)
PMV BLD AUTO: 9.3 FL (ref 8.9–12.7)
POTASSIUM SERPL-SCNC: 4.4 MMOL/L (ref 3.5–5.3)
PROT SERPL-MCNC: 6.3 G/DL (ref 6.4–8.4)
RBC # BLD AUTO: 4.69 MILLION/UL (ref 3.81–5.12)
SODIUM SERPL-SCNC: 142 MMOL/L (ref 135–147)
WBC # BLD AUTO: 4.9 THOUSAND/UL (ref 4.31–10.16)

## 2025-06-10 PROCEDURE — 36415 COLL VENOUS BLD VENIPUNCTURE: CPT

## 2025-06-10 PROCEDURE — 86140 C-REACTIVE PROTEIN: CPT

## 2025-06-10 PROCEDURE — 85652 RBC SED RATE AUTOMATED: CPT

## 2025-06-10 PROCEDURE — 80053 COMPREHEN METABOLIC PANEL: CPT

## 2025-06-10 PROCEDURE — 85025 COMPLETE CBC W/AUTO DIFF WBC: CPT

## 2025-06-12 ENCOUNTER — OFFICE VISIT (OUTPATIENT)
Dept: URGENT CARE | Facility: MEDICAL CENTER | Age: 35
End: 2025-06-12
Payer: COMMERCIAL

## 2025-06-12 VITALS
HEART RATE: 93 BPM | TEMPERATURE: 97.8 F | OXYGEN SATURATION: 100 % | SYSTOLIC BLOOD PRESSURE: 114 MMHG | RESPIRATION RATE: 18 BRPM | DIASTOLIC BLOOD PRESSURE: 79 MMHG

## 2025-06-12 DIAGNOSIS — R51.9 LEFT FACIAL PAIN: Primary | ICD-10-CM

## 2025-06-12 PROCEDURE — 99213 OFFICE O/P EST LOW 20 MIN: CPT | Performed by: NURSE PRACTITIONER

## 2025-06-12 RX ORDER — PREDNISONE 20 MG/1
40 TABLET ORAL DAILY
Qty: 10 TABLET | Refills: 0 | Status: SHIPPED | OUTPATIENT
Start: 2025-06-12 | End: 2025-06-17

## 2025-06-12 NOTE — PROGRESS NOTES
[facility]        NAME: Laura Christiansen is a 34 y.o. female  : 1990    MRN: [id number]  DATE: 2025  TIME: 8:08 PM    Assessment and Plan   Left facial pain [R51.9]  1. Left facial pain  predniSONE 20 mg tablet        Discussed no signs of acute infection on exam. Discussed with patient concern for possible trigeminal neuralgia, will start short course of prednisone. Recommend f/u with Rheumatologist or PCP for further evaluation if not improving with steroids. Discussed supportive care and return precautions. Patient/Parent agreeable to plan of care and all questions answered.     Patient Instructions       Follow up with PCP in 3-5 days.  Proceed to  ER if symptoms worsen.    If tests have been performed at [facility], our office will contact you with results if changes need to be made to the care plan discussed with you at the visit.  [facility]    Chief Complaint     Chief Complaint   Patient presents with   • Facial Pain     Patient reports dry mouth for about 1 month as well as left sided facial pain that's been on and off for last 3 days. States temples and lymph nodes are tender         History of Present Illness       Started: 1 week  Positive: dry eyes and dry mouth  Starting 3 days ago, left jaw pain, left cervical lymph node tender, left temple pain  Reports just finished steroid taper for lupus joint pain flare  Hx of lupus, working with Rheum  Negative: dental pain, fever, congestion, ST  Denies CP, SOB, trouble breathing  Treatment: Tylenol, Celebrex  No hx of trigeminal neuralgia   Reports of hx migraine         Review of Systems   Review of Systems   Constitutional:  Negative for fever.   HENT:  Negative for congestion, dental problem, ear pain, sinus pressure and sore throat.         Left sided facial pain at temple, jaw and left cervical lymph node   Respiratory:  Negative for cough.          Current Medications     Current  Medications[1]    Current Allergies     Allergies as of 06/12/2025 - Reviewed 06/12/2025   Allergen Reaction Noted   • Gluten meal - food allergy  01/16/2019            The following portions of the patient's history were reviewed and updated as appropriate: allergies, current medications, past family history, past medical history, past social history, past surgical history and problem list.     Past Medical History[2]    Past Surgical History[3]    Family History[4]      Medications have been verified.        Objective   /79   Pulse 93   Temp 97.8 °F (36.6 °C)   Resp 18   SpO2 100%   No LMP recorded.       Physical Exam     Physical Exam  Constitutional:       General: She is not in acute distress.     Appearance: Normal appearance. She is not ill-appearing.   HENT:      Head: Normocephalic and atraumatic.        Right Ear: Hearing, tympanic membrane, ear canal and external ear normal.      Left Ear: Hearing, tympanic membrane, ear canal and external ear normal.      Nose: No congestion.      Right Sinus: No maxillary sinus tenderness or frontal sinus tenderness.      Left Sinus: No maxillary sinus tenderness or frontal sinus tenderness.      Mouth/Throat:      Lips: Pink.      Mouth: Mucous membranes are moist.      Dentition: No dental tenderness, gingival swelling or dental abscesses.      Pharynx: Oropharynx is clear.     Cardiovascular:      Rate and Rhythm: Normal rate and regular rhythm.   Pulmonary:      Effort: Pulmonary effort is normal.      Breath sounds: Normal breath sounds.      Comments: No cough on exam    Musculoskeletal:         General: Normal range of motion.   Lymphadenopathy:      Head:      Left side of head: Submandibular adenopathy present.      Cervical: Cervical adenopathy present.      Left cervical: Superficial cervical adenopathy present.     Skin:     General: Skin is warm and dry.     Neurological:      Mental Status: She is alert and oriented to person, place, and time.                           [1]    Current Outpatient Medications:   •  albuterol (PROVENTIL HFA,VENTOLIN HFA) 90 mcg/act inhaler, Inhale 2 puffs every 4 (four) hours as needed for wheezing, Disp: 18 g, Rfl: 4  •  Belimumab (Benlysta) 200 MG/ML SOAJ, Inject 200 mg under the skin once a week, Disp: 4 mL, Rfl: 11  •  budesonide-formoterol (Symbicort) 160-4.5 mcg/act inhaler, Inhale 2 puffs 2 (two) times a day Rinse mouth after use, Disp: 10.2 g, Rfl: 11  •  celecoxib (CeleBREX) 200 mg capsule, Take 1 capsule (200 mg total) by mouth 2 (two) times a day, Disp: 60 capsule, Rfl: 6  •  Cholecalciferol (Vitamin D-3) 125 MCG (5000 UT) TABS, Take by mouth, Disp: , Rfl:   •  citalopram (CeleXA) 40 mg tablet, , Disp: , Rfl:   •  folic acid (FOLVITE) 1 mg tablet, Take 1 tablet (1 mg total) by mouth daily, Disp: 90 tablet, Rfl: 1  •  hydroxychloroquine (PLAQUENIL) 200 mg tablet, Take 1.5 tablets (300 mg total) by mouth in the morning, Disp: 135 tablet, Rfl: 1  •  methotrexate 2.5 MG tablet, 8 tablet po weekly (take all 8 tablets on the same day every week), Disp: 120 tablet, Rfl: 1  •  Multiple Vitamin (MULTIVITAMIN) tablet, Take 1 tablet by mouth in the morning., Disp: , Rfl:   •  norethindrone (Aneta) 0.35 MG tablet, Take 1 tablet (0.35 mg total) by mouth daily, Disp: 84 tablet, Rfl: 1  •  predniSONE 20 mg tablet, Take 2 tablets (40 mg total) by mouth daily for 5 days, Disp: 10 tablet, Rfl: 0  •  tiotropium (Spiriva Respimat) 1.25 MCG/ACT AERS inhaler, Inhale 2 puffs daily, Disp: 4 g, Rfl: 11  •  betamethasone, augmented, (DIPROLENE-AF) 0.05 % cream, Apply topically 2 (two) times a day sparingly (Patient not taking: Reported on 6/12/2025), Disp: 30 g, Rfl: 1  •  methylprednisolone (MEDROL) 4 mg tablet, 4 tabs once daily x5 days, then 3 tabs once daily x5 days, then 2 tabs once daily x5 days, then 1 tab once daily x5 days (Patient not taking: Reported on 6/12/2025), Disp: 50 tablet, Rfl: 0  •  methylPREDNISolone (MEDROL) 8 MG  tablet, Take 3 tablets (24 mg total) by mouth daily for 3 days, THEN 2 tablets (16 mg total) daily for 3 days, THEN 1 tablet (8 mg total) daily for 3 days, THEN 0.5 tablets (4 mg total) daily for 4 days. (Patient not taking: Reported on 6/12/2025), Disp: 20 tablet, Rfl: 1  •  methylPREDNISolone 4 MG tablet therapy pack, Use as directed on package (Patient not taking: Reported on 6/12/2025), Disp: 21 tablet, Rfl: 0  •  predniSONE 10 mg tablet, Take 5 tabs daily x 1days, 4 tabs x 1d, 3 tabs x 1d, 2 tabs x 1d, 1 tab x 1d (Patient not taking: Reported on 6/12/2025), Disp: 15 tablet, Rfl: 0  •  predniSONE 5 mg tablet, 4 tabs x7 days, then 3 tabs x7 days, then 2 tabs x7 days, then 1 tab x7 days, then stop. (Patient not taking: Reported on 6/12/2025), Disp: 70 tablet, Rfl: 0[2]  Past Medical History:  Diagnosis Date   • Anxiety    • Asthma December 2022   • Depression    • Genetic screening 10/2024    negative for 72 genes including BRCA   • Lupus 2024   • Miscarriage 2011   • Pilonidal cyst with abscess    • Seasonal allergies    • Varicella    • Wears glasses    [3]  Past Surgical History:  Procedure Laterality Date   • CARDIAC CATHETERIZATION  8/29/2022    Procedure: Cardiac catheterization;  Surgeon: Jethro Hood MD;  Location: [facility];  Service: Cardiology   • CARDIAC CATHETERIZATION N/A 8/29/2022    Procedure: Cardiac Coronary Angiogram;  Surgeon: Jethro Hood MD;  Location: [facility];  Service: Cardiology   • CHOLECYSTECTOMY  08/2010    Laparoscopic   • COLONOSCOPY N/A 5/9/2018    Procedure: COLONOSCOPY;  Surgeon: Zaid Dunlap MD;  Location: [facility] GI LAB;  Service: Gastroenterology   • [place] EXCISION PILONIDAL CYST/SINUS SIMPLE N/A 4/28/2016    Procedure: EXCISION PILONIDAL CYST;  Surgeon: Neena Levine MD;  Location: [facility] OR;  Service: General   • [place] LAPAROSCOPY W/RMVL ADNEXAL STRUCTURES Bilateral 10/27/2016    Procedure: SALPINGECTOMY;  Surgeon: Elly Brothers MD;  Location: [facility]  OR;  Service: Gynecology   • SALPINGECTOMY     • WISDOM TOOTH EXTRACTION  01/2016    right upper only   [4]  Family History  Problem Relation Name Age of Onset   • Seizures Mother Ni Dalal    • Other Mother Ni Dalal         Epilepsy   • Mental illness Mother Ni Dalal    • Dementia Mother Ni Dalal    • Schizophrenia Mother Ni Dalal    • Suicide Attempts Mother Ni Dalal    • Dysrhythmia Father Jeff Dalal    • Hypertension Father Jeff Dalal    • Stroke Father Jeff Dalal    • Other Father Jeff Dalal         Cardiac disorder, cardiac pacemaker    • Heart disease Father Jeff Dalal    • Coronary artery disease Paternal Grandfather José Miguel    • Cancer Family Paternal Rel.    • Heart disease Brother Freddie Dalal

## 2025-06-13 NOTE — PATIENT INSTRUCTIONS
Prednisone as directed  Warm or cold compress for comfort  Continue Tylenol  Follow up with Rheumatology and/or PCP  If worsening, please go to the Emergency Room

## 2025-06-16 NOTE — TELEPHONE ENCOUNTER
Submitted authorization on covermymeds. Pending code: [id number], attached medical letter and last office visit.

## 2025-06-17 DIAGNOSIS — N94.6 DYSMENORRHEA: ICD-10-CM

## 2025-06-17 DIAGNOSIS — M79.2 NERVE PAIN: Primary | ICD-10-CM

## 2025-06-17 DIAGNOSIS — N92.0 MENORRHAGIA WITH REGULAR CYCLE: ICD-10-CM

## 2025-06-17 RX ORDER — GABAPENTIN 100 MG/1
100 CAPSULE ORAL 3 TIMES DAILY
Qty: 90 CAPSULE | Refills: 6 | Status: SHIPPED | OUTPATIENT
Start: 2025-06-17

## 2025-06-17 RX ORDER — ACETAMINOPHEN AND CODEINE PHOSPHATE 120; 12 MG/5ML; MG/5ML
1 SOLUTION ORAL DAILY
Qty: 28 TABLET | Refills: 0 | Status: SHIPPED | OUTPATIENT
Start: 2025-06-17

## 2025-06-17 NOTE — TELEPHONE ENCOUNTER
Called pt and LM requesting to call back to confirm best location to obtain infusions is [facility]. As well is she has any preferred days or times that I can try to schedule around. Sending [employer] message as well.

## 2025-06-17 NOTE — TELEPHONE ENCOUNTER
Patient needs an appointment. Please contact the patient to schedule an appointment. Last office visit: 7/12/24

## 2025-06-26 ENCOUNTER — HOSPITAL ENCOUNTER (OUTPATIENT)
Dept: INFUSION CENTER | Facility: CLINIC | Age: 35
Discharge: HOME | End: 2025-06-26
Attending: INTERNAL MEDICINE
Payer: COMMERCIAL

## 2025-06-26 VITALS
RESPIRATION RATE: 16 BRPM | TEMPERATURE: 98.6 F | HEART RATE: 102 BPM | DIASTOLIC BLOOD PRESSURE: 76 MMHG | SYSTOLIC BLOOD PRESSURE: 115 MMHG

## 2025-06-26 DIAGNOSIS — M32.8 OTHER FORMS OF SYSTEMIC LUPUS ERYTHEMATOSUS, UNSPECIFIED ORGAN INVOLVEMENT STATUS (HCC): Primary | ICD-10-CM

## 2025-06-26 PROCEDURE — 96375 TX/PRO/DX INJ NEW DRUG ADDON: CPT

## 2025-06-26 PROCEDURE — 96365 THER/PROPH/DIAG IV INF INIT: CPT

## 2025-06-26 RX ORDER — DIPHENHYDRAMINE HCL 25 MG
25 TABLET ORAL ONCE
Status: COMPLETED | OUTPATIENT
Start: 2025-06-26 | End: 2025-06-26

## 2025-06-26 RX ORDER — DIPHENHYDRAMINE HCL 25 MG
25 TABLET ORAL ONCE
OUTPATIENT
Start: 2025-07-24

## 2025-06-26 RX ORDER — ACETAMINOPHEN 325 MG/1
650 TABLET ORAL ONCE
Status: COMPLETED | OUTPATIENT
Start: 2025-06-26 | End: 2025-06-26

## 2025-06-26 RX ORDER — ACETAMINOPHEN 325 MG/1
650 TABLET ORAL ONCE
OUTPATIENT
Start: 2025-07-24

## 2025-06-26 RX ORDER — SODIUM CHLORIDE 9 MG/ML
20 INJECTION, SOLUTION INTRAVENOUS ONCE
OUTPATIENT
Start: 2025-07-24

## 2025-06-26 RX ORDER — METHYLPREDNISOLONE SODIUM SUCCINATE 40 MG/ML
40 INJECTION, POWDER, LYOPHILIZED, FOR SOLUTION INTRAMUSCULAR; INTRAVENOUS ONCE
OUTPATIENT
Start: 2025-07-24

## 2025-06-26 RX ORDER — SODIUM CHLORIDE 9 MG/ML
20 INJECTION, SOLUTION INTRAVENOUS ONCE
Status: COMPLETED | OUTPATIENT
Start: 2025-06-26 | End: 2025-06-26

## 2025-06-26 RX ORDER — METHYLPREDNISOLONE SODIUM SUCCINATE 40 MG/ML
40 INJECTION, POWDER, LYOPHILIZED, FOR SOLUTION INTRAMUSCULAR; INTRAVENOUS ONCE
Status: COMPLETED | OUTPATIENT
Start: 2025-06-26 | End: 2025-06-26

## 2025-06-26 RX ADMIN — ACETAMINOPHEN 650 MG: 325 TABLET, FILM COATED ORAL at 09:24

## 2025-06-26 RX ADMIN — ANIFROLUMAB 300 MG: 300 INJECTION, SOLUTION INTRAVENOUS at 10:02

## 2025-06-26 RX ADMIN — METHYLPREDNISOLONE SODIUM SUCCINATE 40 MG: 40 INJECTION, POWDER, FOR SOLUTION INTRAMUSCULAR; INTRAVENOUS at 09:24

## 2025-06-26 RX ADMIN — SODIUM CHLORIDE 20 ML/HR: 0.9 INJECTION, SOLUTION INTRAVENOUS at 09:21

## 2025-06-26 RX ADMIN — DIPHENHYDRAMINE HYDROCHLORIDE 25 MG: 25 TABLET ORAL at 09:24

## 2025-06-26 NOTE — PLAN OF CARE
Problem: Potential for Falls  Goal: Patient will remain free of falls  Description: INTERVENTIONS:  - Educate patient/family on patient safety including physical limitations  - Instruct patient to call for assistance with activity   - Consider consulting OT/PT to assist with strengthening/mobility based on AM PAC & JH-HLM score  - Consult OT/PT to assist with strengthening/mobility   - Keep Call bell within reach  - Keep bed low and locked with side rails adjusted as appropriate  - Keep care items and personal belongings within reach  - Initiate and maintain comfort rounds  - Make Fall Risk Sign visible to staff  - Apply yellow socks and bracelet for high fall risk patients  - Consider moving patient to room near nurses station  Outcome: Completed

## 2025-06-26 NOTE — PROGRESS NOTES
Pt denies any recent infections/antibiotics, tolerated first time saphnelo well without complications. Confirmed appt back 7-24-25 0900, AVS provided

## 2025-07-09 ENCOUNTER — APPOINTMENT (OUTPATIENT)
Dept: LAB | Facility: CLINIC | Age: 35
End: 2025-07-09
Payer: COMMERCIAL

## 2025-07-09 ENCOUNTER — OFFICE VISIT (OUTPATIENT)
Dept: RHEUMATOLOGY | Facility: CLINIC | Age: 35
End: 2025-07-09
Payer: COMMERCIAL

## 2025-07-16 DIAGNOSIS — N92.0 MENORRHAGIA WITH REGULAR CYCLE: ICD-10-CM

## 2025-07-16 DIAGNOSIS — N94.6 DYSMENORRHEA: ICD-10-CM

## 2025-07-16 RX ORDER — ACETAMINOPHEN AND CODEINE PHOSPHATE 120; 12 MG/5ML; MG/5ML
1 SOLUTION ORAL DAILY
Qty: 28 TABLET | Refills: 0 | Status: SHIPPED | OUTPATIENT
Start: 2025-07-16

## 2025-07-21 ENCOUNTER — PATIENT MESSAGE (OUTPATIENT)
Dept: RHEUMATOLOGY | Facility: CLINIC | Age: 35
End: 2025-07-21

## 2025-07-24 ENCOUNTER — HOSPITAL ENCOUNTER (OUTPATIENT)
Dept: INFUSION CENTER | Facility: CLINIC | Age: 35
Discharge: HOME | End: 2025-07-24
Attending: INTERNAL MEDICINE
Payer: COMMERCIAL

## 2025-07-24 VITALS
TEMPERATURE: 97.6 F | HEART RATE: 88 BPM | RESPIRATION RATE: 16 BRPM | SYSTOLIC BLOOD PRESSURE: 104 MMHG | WEIGHT: 168.43 LBS | BODY MASS INDEX: 28.03 KG/M2 | DIASTOLIC BLOOD PRESSURE: 71 MMHG

## 2025-07-24 DIAGNOSIS — M32.8 OTHER FORMS OF SYSTEMIC LUPUS ERYTHEMATOSUS, UNSPECIFIED ORGAN INVOLVEMENT STATUS (HCC): Primary | ICD-10-CM

## 2025-07-24 PROCEDURE — 96375 TX/PRO/DX INJ NEW DRUG ADDON: CPT

## 2025-07-24 PROCEDURE — 96365 THER/PROPH/DIAG IV INF INIT: CPT

## 2025-07-24 RX ORDER — ACETAMINOPHEN 325 MG/1
650 TABLET ORAL ONCE
OUTPATIENT
Start: 2025-08-21

## 2025-07-24 RX ORDER — ACETAMINOPHEN 325 MG/1
650 TABLET ORAL ONCE
Status: COMPLETED | OUTPATIENT
Start: 2025-07-24 | End: 2025-07-24

## 2025-07-24 RX ORDER — METHYLPREDNISOLONE SODIUM SUCCINATE 40 MG/ML
40 INJECTION, POWDER, LYOPHILIZED, FOR SOLUTION INTRAMUSCULAR; INTRAVENOUS ONCE
Status: COMPLETED | OUTPATIENT
Start: 2025-07-24 | End: 2025-07-24

## 2025-07-24 RX ORDER — SODIUM CHLORIDE 9 MG/ML
20 INJECTION, SOLUTION INTRAVENOUS ONCE
Status: COMPLETED | OUTPATIENT
Start: 2025-07-24 | End: 2025-07-24

## 2025-07-24 RX ORDER — DIPHENHYDRAMINE HCL 25 MG
25 TABLET ORAL ONCE
OUTPATIENT
Start: 2025-08-21

## 2025-07-24 RX ORDER — METHYLPREDNISOLONE SODIUM SUCCINATE 40 MG/ML
40 INJECTION, POWDER, LYOPHILIZED, FOR SOLUTION INTRAMUSCULAR; INTRAVENOUS ONCE
OUTPATIENT
Start: 2025-08-21

## 2025-07-24 RX ORDER — SODIUM CHLORIDE 9 MG/ML
20 INJECTION, SOLUTION INTRAVENOUS ONCE
OUTPATIENT
Start: 2025-08-21

## 2025-07-24 RX ORDER — DIPHENHYDRAMINE HCL 25 MG
25 TABLET ORAL ONCE
Status: COMPLETED | OUTPATIENT
Start: 2025-07-24 | End: 2025-07-24

## 2025-07-24 RX ADMIN — DIPHENHYDRAMINE HYDROCHLORIDE 25 MG: 25 TABLET ORAL at 09:08

## 2025-07-24 RX ADMIN — SODIUM CHLORIDE 20 ML/HR: 0.9 INJECTION, SOLUTION INTRAVENOUS at 09:21

## 2025-07-24 RX ADMIN — METHYLPREDNISOLONE SODIUM SUCCINATE 40 MG: 40 INJECTION, POWDER, FOR SOLUTION INTRAMUSCULAR; INTRAVENOUS at 09:21

## 2025-07-24 RX ADMIN — ACETAMINOPHEN 650 MG: 325 TABLET, FILM COATED ORAL at 09:08

## 2025-07-24 RX ADMIN — ANIFROLUMAB 300 MG: 300 INJECTION, SOLUTION INTRAVENOUS at 09:44

## 2025-08-12 ENCOUNTER — OFFICE VISIT (OUTPATIENT)
Dept: FAMILY MEDICINE CLINIC | Facility: CLINIC | Age: 35
End: 2025-08-12
Payer: COMMERCIAL

## 2025-08-13 ENCOUNTER — HOSPITAL ENCOUNTER (OUTPATIENT)
Dept: ULTRASOUND IMAGING | Facility: HOSPITAL | Age: 35
Discharge: HOME | End: 2025-08-13
Attending: INTERNAL MEDICINE
Payer: COMMERCIAL

## 2025-08-19 ENCOUNTER — CLINICAL SUPPORT (OUTPATIENT)
Dept: FAMILY MEDICINE CLINIC | Facility: CLINIC | Age: 35
End: 2025-08-19
Payer: COMMERCIAL

## 2025-08-19 DIAGNOSIS — E53.8 VITAMIN B12 DEFICIENCY: Primary | ICD-10-CM

## 2025-08-19 PROCEDURE — 96372 THER/PROPH/DIAG INJ SC/IM: CPT

## 2025-08-19 RX ADMIN — CYANOCOBALAMIN 1000 MCG: 1000 INJECTION, SOLUTION INTRAMUSCULAR; SUBCUTANEOUS at 09:11

## (undated) DEVICE — DGW .035 FC J3MM 260CM TEF: Brand: EMERALD

## (undated) DEVICE — GLIDESHEATH SLENDER STAINLESS STEEL KIT: Brand: GLIDESHEATH SLENDER

## (undated) DEVICE — TR BAND RADIAL ARTERY COMPRESSION DEVICE: Brand: TR BAND

## (undated) DEVICE — CATH GUIDE LAUNCHER 5FR EBU 3.5

## (undated) DEVICE — RADIFOCUS OPTITORQUE ANGIOGRAPHIC CATHETER: Brand: OPTITORQUE

## (undated) DEVICE — GUIDEWIRE WHOLEY HI TORQUE INTERM MOD J .035 145CM